# Patient Record
Sex: FEMALE | Race: WHITE | NOT HISPANIC OR LATINO | Employment: FULL TIME | URBAN - METROPOLITAN AREA
[De-identification: names, ages, dates, MRNs, and addresses within clinical notes are randomized per-mention and may not be internally consistent; named-entity substitution may affect disease eponyms.]

---

## 2017-01-24 RX ORDER — GABAPENTIN 300 MG/1
600 CAPSULE ORAL NIGHTLY
Qty: 180 CAPSULE | Refills: 3 | Status: SHIPPED | OUTPATIENT
Start: 2017-01-24 | End: 2018-02-01 | Stop reason: SDUPTHER

## 2017-01-24 NOTE — TELEPHONE ENCOUNTER
----- Message from Yanelis Diggs sent at 1/23/2017  3:06 PM CST -----  Request a new auth / gabapentin ... Insurance  requires 90 days   [facility]/pharmacy #[id number] - [place] - [address] [address]  [address] [place] [address]  Phone: [phone] Fax: [phone]

## 2017-08-25 DIAGNOSIS — Z12.11 COLON CANCER SCREENING: ICD-10-CM

## 2017-09-08 DIAGNOSIS — Z12.31 OTHER SCREENING MAMMOGRAM: ICD-10-CM

## 2017-10-11 ENCOUNTER — OFFICE VISIT (OUTPATIENT)
Dept: OTOLARYNGOLOGY | Facility: CLINIC | Age: 57
End: 2017-10-11
Payer: COMMERCIAL

## 2017-10-11 VITALS
WEIGHT: 107.56 LBS | HEIGHT: 63 IN | HEART RATE: 62 BPM | DIASTOLIC BLOOD PRESSURE: 77 MMHG | BODY MASS INDEX: 19.06 KG/M2 | SYSTOLIC BLOOD PRESSURE: 126 MMHG

## 2017-10-11 DIAGNOSIS — J06.9 VIRAL UPPER RESPIRATORY TRACT INFECTION: Primary | ICD-10-CM

## 2017-10-11 PROCEDURE — 96372 THER/PROPH/DIAG INJ SC/IM: CPT | Mod: S$GLB,,, | Performed by: NURSE PRACTITIONER

## 2017-10-11 PROCEDURE — 99203 OFFICE O/P NEW LOW 30 MIN: CPT | Mod: 25,S$GLB,, | Performed by: NURSE PRACTITIONER

## 2017-10-11 PROCEDURE — 99999 PR PBB SHADOW E&M-EST. PATIENT-LVL IV: CPT | Mod: PBBFAC,,, | Performed by: NURSE PRACTITIONER

## 2017-10-11 RX ORDER — TRIAMCINOLONE ACETONIDE 40 MG/ML
40 INJECTION, SUSPENSION INTRA-ARTICULAR; INTRAMUSCULAR ONCE
Status: COMPLETED | OUTPATIENT
Start: 2017-10-11 | End: 2017-10-11

## 2017-10-11 RX ADMIN — TRIAMCINOLONE ACETONIDE 40 MG: 40 INJECTION, SUSPENSION INTRA-ARTICULAR; INTRAMUSCULAR at 08:10

## 2017-10-11 NOTE — PATIENT INSTRUCTIONS
"When flying:  Take Sudafed 60 mg 1 hour prior to flying. Afrin nasal spray 30 minutes before take-off. Chew gum/pop ears gently when taking off and coming down. Drink water. Use "Airplanes" plugs designed for flying.     "

## 2017-10-11 NOTE — PROGRESS NOTES
Subjective:       Patient ID: Ashley Piper is a 57 y.o. female.    Chief Complaint: Nasal Congestion (x 2 days) and Cough    HPI   Patient is getting  tomorrow and flying to [place] for her honeymoon. She caught a cold virus 2 days ago: started off with sore scratchy throat, felt horrible yesterday, now with runny nose and cough. She states when she feels horrible, her ENT is [place], Dr. Ledbetter gives her a steroid shot. She is requesting a steroid shot as she cannot be sick tomorrow on her wedding day.     Review of Systems   Constitutional: Negative.  Negative for fever.   HENT: Positive for rhinorrhea and sore throat.    Eyes: Negative.    Respiratory: Positive for cough.    Cardiovascular: Negative.    Gastrointestinal: Negative.    Musculoskeletal: Negative.    Skin: Negative.    Neurological: Negative.    Hematological: Negative.    Psychiatric/Behavioral: Negative.        Objective:      Physical Exam   Constitutional: She is oriented to person, place, and time. Vital signs are normal. She appears well-developed and well-nourished. She is cooperative. She does not appear ill. No distress.   HENT:   Head: Normocephalic and atraumatic.   Right Ear: Hearing, tympanic membrane, external ear and ear canal normal. Tympanic membrane is not erythematous. No middle ear effusion.   Left Ear: Hearing, tympanic membrane, external ear and ear canal normal. Tympanic membrane is not erythematous.  No middle ear effusion.   Nose: Nose normal. No mucosal edema or rhinorrhea. Right sinus exhibits no maxillary sinus tenderness and no frontal sinus tenderness. Left sinus exhibits no maxillary sinus tenderness and no frontal sinus tenderness.   Mouth/Throat: Uvula is midline, oropharynx is clear and moist and mucous membranes are normal. Mucous membranes are not pale, not dry and not cyanotic. No oral lesions. No oropharyngeal exudate, posterior oropharyngeal edema or posterior oropharyngeal erythema.   Eyes:  Conjunctivae, EOM and lids are normal. Pupils are equal, round, and reactive to light. Right eye exhibits no discharge. Left eye exhibits no discharge. No scleral icterus.   Neck: Trachea normal and normal range of motion. Neck supple. No tracheal deviation present. No thyroid mass and no thyromegaly present.   Cardiovascular: Normal rate.    Pulmonary/Chest: Effort normal. No stridor. No respiratory distress. She has no wheezes.   Musculoskeletal: Normal range of motion.   Lymphadenopathy:        Head (right side): No submental, no submandibular, no tonsillar, no preauricular and no posterior auricular adenopathy present.        Head (left side): No submental, no submandibular, no tonsillar, no preauricular and no posterior auricular adenopathy present.     She has no cervical adenopathy.        Right cervical: No superficial cervical and no posterior cervical adenopathy present.       Left cervical: No superficial cervical and no posterior cervical adenopathy present.   Neurological: She is alert and oriented to person, place, and time. She has normal strength. Coordination and gait normal.   Skin: Skin is warm, dry and intact. No lesion and no rash noted. She is not diaphoretic. No cyanosis. No pallor.   Psychiatric: She has a normal mood and affect. Her speech is normal and behavior is normal. Judgment and thought content normal. Cognition and memory are normal.   Nursing note and vitals reviewed.      Assessment:       1. Viral upper respiratory tract infection        Plan:     Kenalog 40 mg IM X 1 in office today    Symptomatic care  Call or return if symptoms worsen/persist

## 2017-10-25 DIAGNOSIS — Z79.890 POSTMENOPAUSAL HRT (HORMONE REPLACEMENT THERAPY): ICD-10-CM

## 2017-10-25 RX ORDER — ESTRADIOL 2 MG/1
TABLET ORAL
Qty: 90 TABLET | Refills: 3 | Status: SHIPPED | OUTPATIENT
Start: 2017-10-25 | End: 2018-03-19 | Stop reason: SDUPTHER

## 2017-11-03 DIAGNOSIS — B00.9 HERPES: ICD-10-CM

## 2017-11-03 NOTE — TELEPHONE ENCOUNTER
----- Message from Xiomara Contreras sent at 11/3/2017 10:22 AM CDT -----  Contact: self  Patient is requesting a Rx refill for Acyclovir. Patient uses [facility] in [place]. Patient can be reached at [phone]. Patient is also requesting an annual appointment.

## 2017-11-06 ENCOUNTER — PATIENT MESSAGE (OUTPATIENT)
Dept: OBSTETRICS AND GYNECOLOGY | Facility: CLINIC | Age: 57
End: 2017-11-06

## 2017-11-06 DIAGNOSIS — B00.9 HERPES: ICD-10-CM

## 2017-11-06 RX ORDER — ACYCLOVIR 200 MG/1
400 CAPSULE ORAL 2 TIMES DAILY
Qty: 30 CAPSULE | Refills: 11 | Status: SHIPPED | OUTPATIENT
Start: 2017-11-06 | End: 2018-03-19 | Stop reason: SDUPTHER

## 2017-11-07 ENCOUNTER — PATIENT MESSAGE (OUTPATIENT)
Dept: FAMILY MEDICINE | Facility: CLINIC | Age: 57
End: 2017-11-07

## 2017-11-07 DIAGNOSIS — Z00.00 ROUTINE GENERAL MEDICAL EXAMINATION AT A HEALTH CARE FACILITY: Primary | ICD-10-CM

## 2017-11-07 RX ORDER — ACYCLOVIR 200 MG/1
400 CAPSULE ORAL 2 TIMES DAILY
Qty: 30 CAPSULE | Refills: 11 | Status: SHIPPED | OUTPATIENT
Start: 2017-11-07 | End: 2019-01-21 | Stop reason: SDUPTHER

## 2017-11-08 DIAGNOSIS — F43.9 SITUATIONAL STRESS: ICD-10-CM

## 2017-11-10 ENCOUNTER — TELEPHONE (OUTPATIENT)
Dept: OBSTETRICS AND GYNECOLOGY | Facility: CLINIC | Age: 57
End: 2017-11-10

## 2017-11-10 NOTE — TELEPHONE ENCOUNTER
----- Message from Cyndi Mcdonough sent at 11/10/2017 12:09 PM CST -----  _X  1st Request  _  2nd Request  _  3rd Request        Who: Shelly([facility])    Why: Requesting a call back in regards to an e-script for Lexapro 20 mg that was sent over. Please call respond or call to discuss.    What Number to Call Back:[phone]    When to Expect a call back: (Within 24 hours)    Please return the call at earliest convenience. Thanks!

## 2017-11-14 RX ORDER — ESCITALOPRAM OXALATE 20 MG/1
10 TABLET ORAL DAILY
Qty: 90 TABLET | Refills: 1 | Status: SHIPPED | OUTPATIENT
Start: 2017-11-14 | End: 2017-11-29 | Stop reason: SDUPTHER

## 2017-11-29 ENCOUNTER — OFFICE VISIT (OUTPATIENT)
Dept: OBSTETRICS AND GYNECOLOGY | Facility: CLINIC | Age: 57
End: 2017-11-29
Payer: COMMERCIAL

## 2017-11-29 VITALS
SYSTOLIC BLOOD PRESSURE: 130 MMHG | HEIGHT: 63 IN | WEIGHT: 106.69 LBS | DIASTOLIC BLOOD PRESSURE: 86 MMHG | BODY MASS INDEX: 18.9 KG/M2

## 2017-11-29 DIAGNOSIS — F43.9 SITUATIONAL STRESS: ICD-10-CM

## 2017-11-29 DIAGNOSIS — Z01.419 WOMEN'S ANNUAL ROUTINE GYNECOLOGICAL EXAMINATION: Primary | ICD-10-CM

## 2017-11-29 DIAGNOSIS — Z79.890 POSTMENOPAUSAL HRT (HORMONE REPLACEMENT THERAPY): ICD-10-CM

## 2017-11-29 PROCEDURE — 99999 PR PBB SHADOW E&M-EST. PATIENT-LVL IV: CPT | Mod: PBBFAC,,, | Performed by: NURSE PRACTITIONER

## 2017-11-29 PROCEDURE — 99396 PREV VISIT EST AGE 40-64: CPT | Mod: S$GLB,,, | Performed by: NURSE PRACTITIONER

## 2017-11-29 RX ORDER — ESCITALOPRAM OXALATE 20 MG/1
10 TABLET ORAL DAILY
Qty: 90 TABLET | Refills: 3 | Status: SHIPPED | OUTPATIENT
Start: 2017-11-29 | End: 2018-03-19

## 2017-11-29 RX ORDER — ONDANSETRON HYDROCHLORIDE 8 MG/1
8 TABLET, FILM COATED ORAL EVERY 6 HOURS PRN
Refills: 2 | COMMUNITY
Start: 2017-10-11 | End: 2019-05-02 | Stop reason: SDUPTHER

## 2017-11-29 RX ORDER — ALPRAZOLAM 0.5 MG/1
0.5 TABLET ORAL 3 TIMES DAILY PRN
Qty: 20 TABLET | Refills: 0 | Status: SHIPPED | OUTPATIENT
Start: 2017-11-29 | End: 2018-03-19 | Stop reason: SDUPTHER

## 2017-11-29 NOTE — PROGRESS NOTES
CC: Annual  HPI: Pt is a 57 y.o.  female who presents for routine annual exam. She is post-menopausal. She is on HRT-requesting refill of provera and estrace. She does not want STD screening. Pt is very stressed right now as her father is ill and not expected to live much longer. She is the power of  and main caregiver. Requesting a refill of xanax d/t stress as this was what helped her last year when dealing with her mother's death. Requesting refill of zovirax as well in case of an outbreak. MMG orders have been placed by her PCP-patient will call and make her own appointment. Recently had another granddaughter born. Pap is current. Taking lexapro 10 mg-splits 20 mg pill in half as that dose was too much for her.    Last pap-9/2016 WNL  Last mmg-none on record    ROS:  GENERAL: Feeling well overall. Denies fever or chills.   SKIN: Denies rash or lesions.   HEAD: Denies head injury or headache.   NODES: Denies enlarged lymph nodes.   CHEST: Denies chest pain or shortness of breath.   CARDIOVASCULAR: Denies palpitations or left sided chest pain.   ABDOMEN: No abdominal pain, constipation, diarrhea, nausea, vomiting or rectal bleeding.   URINARY: No dysuria, hematuria, or burning on urination.  REPRODUCTIVE: See HPI.   BREASTS: Denies pain, lumps, or nipple discharge.   HEMATOLOGIC: No easy bruisability or excessive bleeding.   MUSCULOSKELETAL: Denies joint pain or swelling.   NEUROLOGIC: Denies syncope or weakness.   PSYCHIATRIC: Denies depression, anxiety or mood swings.    PE:   APPEARANCE: Well nourished, well developed, White female in no acute distress.  NODES: no cervical, supraclavicular, or inguinal lymphadenopathy  BREASTS: Symmetrical, no skin changes or visible lesions. No palpable masses, nipple discharge or adenopathy bilaterally. + bilateral breast implants  ABDOMEN: Soft. No tenderness or masses. No distention. No hernias palpated. No CVA tenderness.  VULVA: No lesions. Normal external female  genitalia.  URETHRAL MEATUS: Normal size and location, no lesions, no prolapse.  URETHRA: No masses, tenderness, or prolapse.  VAGINA: Moist. No lesions or lacerations noted. No abnormal discharge present. No odor present.   CERVIX: No lesions or discharge. No cervical motion tenderness.   UTERUS: Normal size, regular shape, mobile, non-tender.  ADNEXA: No tenderness. No fullness or masses palpated in the adnexal regions.   ANUS PERINEUM: Normal.      Diagnosis:  1. Women's annual routine gynecological examination    2. Postmenopausal HRT (hormone replacement therapy)    3. Situational stress        Plan:     Orders Placed This Encounter    ALPRAZolam (XANAX) 0.5 MG tablet    escitalopram oxalate (LEXAPRO) 20 MG tablet     -pap current  -mmg order in and patient will make her own appt  -rx xanax  -refill of estrace, provera, and lexapro    Patient was counseled today on the new ACS guidelines for cervical cytology screening as well as the current recommendations for breast cancer screening. She was counseled to follow up with her PCP for other routine health maintenance. Counseling session lasted approximately 10 minutes, and all her questions were answered.    Follow-up with me in 1 year for routine exam; pap in 2 years.

## 2017-12-05 DIAGNOSIS — Z79.890 POSTMENOPAUSAL HRT (HORMONE REPLACEMENT THERAPY): ICD-10-CM

## 2017-12-06 RX ORDER — MEDROXYPROGESTERONE ACETATE 5 MG/1
5 TABLET ORAL DAILY
Qty: 90 TABLET | Refills: 4 | Status: SHIPPED | OUTPATIENT
Start: 2017-12-06 | End: 2018-03-19 | Stop reason: SDUPTHER

## 2018-01-24 ENCOUNTER — PATIENT MESSAGE (OUTPATIENT)
Dept: PAIN MEDICINE | Facility: CLINIC | Age: 58
End: 2018-01-24

## 2018-02-01 ENCOUNTER — OFFICE VISIT (OUTPATIENT)
Dept: PAIN MEDICINE | Facility: CLINIC | Age: 58
End: 2018-02-01
Payer: COMMERCIAL

## 2018-02-01 VITALS
WEIGHT: 107.81 LBS | TEMPERATURE: 98 F | DIASTOLIC BLOOD PRESSURE: 70 MMHG | BODY MASS INDEX: 19.1 KG/M2 | SYSTOLIC BLOOD PRESSURE: 110 MMHG | RESPIRATION RATE: 18 BRPM | HEART RATE: 72 BPM

## 2018-02-01 DIAGNOSIS — M50.30 DDD (DEGENERATIVE DISC DISEASE), CERVICAL: ICD-10-CM

## 2018-02-01 DIAGNOSIS — M47.892 OTHER OSTEOARTHRITIS OF SPINE, CERVICAL REGION: ICD-10-CM

## 2018-02-01 DIAGNOSIS — M54.12 CERVICAL RADICULOPATHY: Primary | ICD-10-CM

## 2018-02-01 PROCEDURE — 99213 OFFICE O/P EST LOW 20 MIN: CPT | Mod: SA,S$GLB,, | Performed by: PHYSICIAN ASSISTANT

## 2018-02-01 PROCEDURE — 99999 PR PBB SHADOW E&M-EST. PATIENT-LVL IV: CPT | Mod: PBBFAC,,, | Performed by: PHYSICIAN ASSISTANT

## 2018-02-01 PROCEDURE — 3008F BODY MASS INDEX DOCD: CPT | Mod: S$GLB,,, | Performed by: PHYSICIAN ASSISTANT

## 2018-02-01 RX ORDER — GABAPENTIN 300 MG/1
600 CAPSULE ORAL NIGHTLY
Qty: 180 CAPSULE | Refills: 3 | Status: SHIPPED | OUTPATIENT
Start: 2018-02-01 | End: 2019-01-29 | Stop reason: SDUPTHER

## 2018-02-06 NOTE — PROGRESS NOTES
This note was completed with dictation software and grammatical errors may exist.    CC:Neck pain    HPI: The patient is a 57-year-old woman with no major past medical history who presents in referral from the Dr. Kathy Melton for neck pain, right shoulder pain.  She returns in follow-up today with neck pain.  It has been almost 2 years since her last visit.  She reports left sided neck pain radiating to the trapezius muscle.  This is worse if she does not take her gabapentin.  She describes it as tight but not keeping her from doing anything.  She denies any radiation to her arm.  She denies weakness, numbness, bladder or bowel incontinence.    Pain intervention history:  She completed physical therapy at the [facility] with moderate relief.  She is status post C7-T1 cervical interlaminar epidural steroid injection on 9/21/15 with 15% relief.  She is status post C7-T1 cervical interlaminar epidural steroid injection on 10/26/15 with 75% relief.     ROS: She reports difficulty sleeping.  Balance of review of systems is negative.    Medical, surgical, family and social history reviewed elsewhere in record.    Medications/Allergies: See med card    Vitals:    02/01/18 1430   BP: 110/70   Pulse: 72   Resp: 18   Temp: 98.3 °F (36.8 °C)   TempSrc: Oral   Weight: 48.9 kg (107 lb 12.9 oz)   PainSc:   2   PainLoc: Neck         Physical exam:  Gen: A and O x3, pleasant, well-groomed  Skin: No rashes or obvious lesions  HEENT: PERRLA, no obvious deformities on ears or in canals.  CVS: Regular rate and rhythm, normal S1 and S2, no murmurs.  Resp: Clear to auscultation bilaterally, no wheezes or rales.  Abdomen: Soft, NT/ND, normal bowel sounds present.  Musculoskeletal: No antalgic gait.     Neuro:  Upper extremities: 5/5 strength bilaterally   Reflexes: Brachioradialis 2+, Bicep 2+, Tricep 2+.   Sensory: Intact and symmetrical to light touch and pinprick in C2-T1 dermatomes bilaterally.     Cervical  Spine:  Cervical spine: Range of motion is full with flexion and extension and lateral rotation with mild increased left neck pain during left lateral rotation.  Spurling's maneuver causes mild left neck pain to either side.  Myofascial exam: Mild tenderness to palpation across the left trapezius muscle.      Imagin/2/15 MRI C-spine  The cervical spinal cord is normal in signal and contour. No abnormal intrathecal enhancement.  C2/C3:No significant disc bulge, central canal or neural foraminal stenosis.  C3/C4: No significant disk bulge, central canal or neural foraminal stenosis.  C4/C5: Trace disk bulge with a pituitary hypertrophy facet joint arthropathy with out significant center canal stenosis with mild/moderate left and mild right neural foraminal stenosis.  C5/C6: Posterior disk osteophyte with uncovertebral joint or bridging facet arthropathy with mild center canal and moderate bilateral foraminal stenosis.  C6/C7: Posterior disk osteophyte with uncovertebral joint hypertrophy and facet joint arthropathy with mild/moderate central canal stenosis and moderate bilateral neural foraminal stenosis.   C7/T1: No significant disc bulge, central canal or neural foraminal stenosis.     Assessment:  The patient is a 57-year-old woman with no major past medical history who presents in referral from the Dr. Kathy Melton for neck pain, right shoulder pain.  1. Cervical radiculopathy     2. DDD (degenerative disc disease), cervical     3. Other osteoarthritis of spine, cervical region           Plan:  1.  I refilled the patient's gabapentin 600 mg nightly.  She reports adequate pain control with this.  2.  Follow-up in one year or sooner as needed.    Greater than 50% of this 15 minute visit was spent on counseling the patient.

## 2018-02-11 RX ORDER — GABAPENTIN 300 MG/1
600 CAPSULE ORAL NIGHTLY
Qty: 180 CAPSULE | Refills: 3 | Status: SHIPPED | OUTPATIENT
Start: 2018-02-11 | End: 2018-03-19 | Stop reason: SDUPTHER

## 2018-02-26 ENCOUNTER — TELEPHONE (OUTPATIENT)
Dept: GASTROENTEROLOGY | Facility: CLINIC | Age: 58
End: 2018-02-26

## 2018-02-26 DIAGNOSIS — Z12.11 COLON CANCER SCREENING: Primary | ICD-10-CM

## 2018-02-26 NOTE — TELEPHONE ENCOUNTER
----- Message from Mariano Kapoor MD sent at 2/26/2018 10:57 AM CST -----  Contact: Pt  done  ----- Message -----  From: Estee Cruz MA  Sent: 2/26/2018  10:38 AM  To: Mariano Kapoor MD     She stated you did a colonoscopy on her  last week and asked if you could do a colonoscopy on her.  She is calling for you to put an order in so she can schedule. She will call the endoscopy schedulers tomorrow to set up a date and time.  ----- Message -----  From: Veena Austin  Sent: 2/26/2018   9:52 AM  To: Antwon DIAZ Staff    Pt is calling to have an order placed in system for colonoscopy and can be reached at [phone].    Thank you

## 2018-02-27 DIAGNOSIS — Z12.11 SPECIAL SCREENING FOR MALIGNANT NEOPLASMS, COLON: Primary | ICD-10-CM

## 2018-02-27 RX ORDER — SODIUM, POTASSIUM,MAG SULFATES 17.5-3.13G
SOLUTION, RECONSTITUTED, ORAL ORAL
Qty: 1 BOTTLE | Refills: 0 | Status: SHIPPED | OUTPATIENT
Start: 2018-02-27 | End: 2019-01-21

## 2018-03-15 ENCOUNTER — LAB VISIT (OUTPATIENT)
Dept: LAB | Facility: HOSPITAL | Age: 58
End: 2018-03-15
Attending: FAMILY MEDICINE
Payer: COMMERCIAL

## 2018-03-15 DIAGNOSIS — Z00.00 ROUTINE GENERAL MEDICAL EXAMINATION AT A HEALTH CARE FACILITY: ICD-10-CM

## 2018-03-15 LAB
ALBUMIN SERPL BCP-MCNC: 3.8 G/DL
ALP SERPL-CCNC: 45 U/L
ALT SERPL W/O P-5'-P-CCNC: 17 U/L
ANION GAP SERPL CALC-SCNC: 8 MMOL/L
AST SERPL-CCNC: 19 U/L
BASOPHILS # BLD AUTO: 0.04 K/UL
BASOPHILS NFR BLD: 0.9 %
BILIRUB SERPL-MCNC: 0.4 MG/DL
BUN SERPL-MCNC: 18 MG/DL
CALCIUM SERPL-MCNC: 9.3 MG/DL
CHLORIDE SERPL-SCNC: 102 MMOL/L
CHOLEST SERPL-MCNC: 172 MG/DL
CHOLEST/HDLC SERPL: 2.4 {RATIO}
CO2 SERPL-SCNC: 28 MMOL/L
CREAT SERPL-MCNC: 0.7 MG/DL
DIFFERENTIAL METHOD: ABNORMAL
EOSINOPHIL # BLD AUTO: 0.2 K/UL
EOSINOPHIL NFR BLD: 4 %
ERYTHROCYTE [DISTWIDTH] IN BLOOD BY AUTOMATED COUNT: 12.4 %
EST. GFR  (AFRICAN AMERICAN): >60 ML/MIN/1.73 M^2
EST. GFR  (NON AFRICAN AMERICAN): >60 ML/MIN/1.73 M^2
GLUCOSE SERPL-MCNC: 86 MG/DL
HCT VFR BLD AUTO: 39.8 %
HDLC SERPL-MCNC: 72 MG/DL
HDLC SERPL: 41.9 %
HGB BLD-MCNC: 13.3 G/DL
IMM GRANULOCYTES # BLD AUTO: 0.01 K/UL
IMM GRANULOCYTES NFR BLD AUTO: 0.2 %
LDLC SERPL CALC-MCNC: 86.6 MG/DL
LYMPHOCYTES # BLD AUTO: 1.4 K/UL
LYMPHOCYTES NFR BLD: 31.9 %
MCH RBC QN AUTO: 31.7 PG
MCHC RBC AUTO-ENTMCNC: 33.4 G/DL
MCV RBC AUTO: 95 FL
MONOCYTES # BLD AUTO: 0.3 K/UL
MONOCYTES NFR BLD: 7.6 %
NEUTROPHILS # BLD AUTO: 2.5 K/UL
NEUTROPHILS NFR BLD: 55.4 %
NONHDLC SERPL-MCNC: 100 MG/DL
NRBC BLD-RTO: 0 /100 WBC
PLATELET # BLD AUTO: 216 K/UL
PMV BLD AUTO: 11.3 FL
POTASSIUM SERPL-SCNC: 4.2 MMOL/L
PROT SERPL-MCNC: 6.9 G/DL
RBC # BLD AUTO: 4.2 M/UL
SODIUM SERPL-SCNC: 138 MMOL/L
TRIGL SERPL-MCNC: 67 MG/DL
TSH SERPL DL<=0.005 MIU/L-ACNC: 1.12 UIU/ML
WBC # BLD AUTO: 4.48 K/UL

## 2018-03-15 PROCEDURE — 80053 COMPREHEN METABOLIC PANEL: CPT

## 2018-03-15 PROCEDURE — 36415 COLL VENOUS BLD VENIPUNCTURE: CPT | Mod: PO

## 2018-03-15 PROCEDURE — 85025 COMPLETE CBC W/AUTO DIFF WBC: CPT

## 2018-03-15 PROCEDURE — 84443 ASSAY THYROID STIM HORMONE: CPT

## 2018-03-15 PROCEDURE — 80061 LIPID PANEL: CPT

## 2018-03-19 ENCOUNTER — LAB VISIT (OUTPATIENT)
Dept: LAB | Facility: HOSPITAL | Age: 58
End: 2018-03-19
Attending: FAMILY MEDICINE
Payer: COMMERCIAL

## 2018-03-19 ENCOUNTER — OFFICE VISIT (OUTPATIENT)
Dept: FAMILY MEDICINE | Facility: CLINIC | Age: 58
End: 2018-03-19
Payer: COMMERCIAL

## 2018-03-19 VITALS
DIASTOLIC BLOOD PRESSURE: 68 MMHG | WEIGHT: 107.81 LBS | SYSTOLIC BLOOD PRESSURE: 117 MMHG | TEMPERATURE: 98 F | BODY MASS INDEX: 19.1 KG/M2 | HEIGHT: 63 IN | HEART RATE: 65 BPM

## 2018-03-19 DIAGNOSIS — M50.30 DDD (DEGENERATIVE DISC DISEASE), CERVICAL: ICD-10-CM

## 2018-03-19 DIAGNOSIS — F51.02 ADJUSTMENT INSOMNIA: ICD-10-CM

## 2018-03-19 DIAGNOSIS — F43.9 SITUATIONAL STRESS: ICD-10-CM

## 2018-03-19 DIAGNOSIS — Z11.59 NEED FOR HEPATITIS C SCREENING TEST: ICD-10-CM

## 2018-03-19 DIAGNOSIS — M85.872 OSTEOPENIA OF BOTH FEET: ICD-10-CM

## 2018-03-19 DIAGNOSIS — M85.871 OSTEOPENIA OF BOTH FEET: ICD-10-CM

## 2018-03-19 DIAGNOSIS — J30.1 CHRONIC SEASONAL ALLERGIC RHINITIS DUE TO POLLEN: ICD-10-CM

## 2018-03-19 DIAGNOSIS — Z79.890 POSTMENOPAUSAL HRT (HORMONE REPLACEMENT THERAPY): ICD-10-CM

## 2018-03-19 DIAGNOSIS — Z00.00 ROUTINE GENERAL MEDICAL EXAMINATION AT A HEALTH CARE FACILITY: Primary | ICD-10-CM

## 2018-03-19 DIAGNOSIS — M50.30 DEGENERATION OF CERVICAL INTERVERTEBRAL DISC: ICD-10-CM

## 2018-03-19 DIAGNOSIS — Z12.39 SCREENING FOR BREAST CANCER: ICD-10-CM

## 2018-03-19 DIAGNOSIS — Z78.0 MENOPAUSE: ICD-10-CM

## 2018-03-19 DIAGNOSIS — F41.9 ANXIETY: ICD-10-CM

## 2018-03-19 PROCEDURE — 99396 PREV VISIT EST AGE 40-64: CPT | Mod: S$GLB,,, | Performed by: FAMILY MEDICINE

## 2018-03-19 PROCEDURE — 99999 PR PBB SHADOW E&M-EST. PATIENT-LVL III: CPT | Mod: PBBFAC,,, | Performed by: FAMILY MEDICINE

## 2018-03-19 PROCEDURE — 36415 COLL VENOUS BLD VENIPUNCTURE: CPT | Mod: PO

## 2018-03-19 PROCEDURE — 86803 HEPATITIS C AB TEST: CPT

## 2018-03-19 RX ORDER — ESTRADIOL 2 MG/1
2 TABLET ORAL DAILY
Qty: 90 TABLET | Refills: 2 | Status: SHIPPED | OUTPATIENT
Start: 2018-03-19 | End: 2019-01-21 | Stop reason: SDUPTHER

## 2018-03-19 RX ORDER — ALPRAZOLAM 0.5 MG/1
0.5 TABLET ORAL 3 TIMES DAILY PRN
Qty: 20 TABLET | Refills: 0 | Status: SHIPPED | OUTPATIENT
Start: 2018-03-19 | End: 2019-05-02 | Stop reason: SDUPTHER

## 2018-03-19 RX ORDER — MEDROXYPROGESTERONE ACETATE 5 MG/1
5 TABLET ORAL DAILY
Qty: 90 TABLET | Refills: 2 | Status: SHIPPED | OUTPATIENT
Start: 2018-03-19 | End: 2019-01-21 | Stop reason: SDUPTHER

## 2018-03-19 RX ORDER — ESCITALOPRAM OXALATE 10 MG/1
10 TABLET ORAL DAILY
Qty: 90 TABLET | Refills: 2 | Status: SHIPPED | OUTPATIENT
Start: 2018-03-19 | End: 2018-12-18 | Stop reason: SDUPTHER

## 2018-03-19 NOTE — PROGRESS NOTES
Subjective:     Patient ID: Ashley Donnelly is a 57 y.o. female.    Chief Complaint: Annual Exam    HPI  Review of Systems   Constitutional: Negative for appetite change, fatigue and fever.   HENT: Negative for hearing loss and sore throat.    Eyes: Negative.    Respiratory: Negative for cough, chest tightness, shortness of breath and wheezing.    Cardiovascular: Negative for chest pain, palpitations and leg swelling.   Gastrointestinal: Negative for abdominal pain, blood in stool, constipation, diarrhea, nausea and vomiting.   Endocrine: Negative.    Genitourinary: Negative for difficulty urinating, dysuria, frequency, hematuria, menstrual problem, pelvic pain and urgency.   Musculoskeletal: Negative for back pain.   Skin: Negative for pallor and rash.   Allergic/Immunologic: Negative.    Neurological: Negative for dizziness, syncope, light-headedness and headaches.   Hematological: Negative for adenopathy.   Psychiatric/Behavioral: Negative.  Negative for dysphoric mood and sleep disturbance.       Objective:      Physical Exam   Constitutional: She is oriented to person, place, and time. She appears well-developed and well-nourished.   HENT:   Head: Normocephalic and atraumatic.   Right Ear: External ear normal.   Left Ear: External ear normal.   Nose: Nose normal.   Mouth/Throat: Oropharynx is clear and moist.   Eyes: Conjunctivae and EOM are normal. Pupils are equal, round, and reactive to light.   Neck: Normal range of motion. Neck supple. No JVD present. No thyromegaly present.   Cardiovascular: Normal rate, regular rhythm, normal heart sounds and intact distal pulses.    No murmur heard.  Pulmonary/Chest: Effort normal and breath sounds normal.   Abdominal: Soft. Bowel sounds are normal. She exhibits no mass. There is no tenderness.   Musculoskeletal: Normal range of motion. She exhibits no edema.   Lymphadenopathy:     She has no cervical adenopathy.   Neurological: She is alert and oriented to person, place,  and time. She has normal reflexes.   Skin: Skin is warm and dry.   Nevi of back   Psychiatric: She has a normal mood and affect. Her behavior is normal. Judgment and thought content normal.   Vitals reviewed.      Assessment:     Ashley was seen today for annual exam.    Diagnoses and all orders for this visit:    Routine general medical examination at a health care facility    Screening for breast cancer  -     Mammo Digital Screening Bilat with CAD; Future    Degeneration of cervical intervertebral disc    DDD (degenerative disc disease), cervical    Postmenopausal HRT (hormone replacement therapy)  -     estradiol (ESTRACE) 2 MG tablet; Take 1 tablet (2 mg total) by mouth once daily.  -     medroxyPROGESTERone (PROVERA) 5 MG tablet; Take 1 tablet (5 mg total) by mouth once daily.    Osteopenia of both feet    Chronic seasonal allergic rhinitis due to pollen    Menopause  -     DXA Bone Density Spine And Hip; Future    Need for hepatitis C screening test  -     Hepatitis C antibody; Future    Adjustment insomnia    Anxiety    Situational stress  -     ALPRAZolam (XANAX) 0.5 MG tablet; Take 1 tablet (0.5 mg total) by mouth 3 (three) times daily as needed for Insomnia or Anxiety.    Other orders  -     escitalopram oxalate (LEXAPRO) 10 MG tablet; Take 1 tablet (10 mg total) by mouth once daily.

## 2018-03-20 LAB — HCV AB SERPL QL IA: NEGATIVE

## 2018-04-03 ENCOUNTER — HOSPITAL ENCOUNTER (OUTPATIENT)
Dept: RADIOLOGY | Facility: HOSPITAL | Age: 58
Discharge: HOME | End: 2018-04-03
Attending: FAMILY MEDICINE
Payer: COMMERCIAL

## 2018-04-03 DIAGNOSIS — Z12.39 SCREENING FOR BREAST CANCER: ICD-10-CM

## 2018-04-03 PROCEDURE — 77063 BREAST TOMOSYNTHESIS BI: CPT | Mod: 26,,, | Performed by: RADIOLOGY

## 2018-04-03 PROCEDURE — 77067 SCR MAMMO BI INCL CAD: CPT | Mod: 26,,, | Performed by: RADIOLOGY

## 2018-04-03 PROCEDURE — 77067 SCR MAMMO BI INCL CAD: CPT | Mod: TC,PO

## 2018-04-16 ENCOUNTER — PATIENT MESSAGE (OUTPATIENT)
Dept: PAIN MEDICINE | Facility: CLINIC | Age: 58
End: 2018-04-16

## 2018-04-16 NOTE — LETTER
April 17, 2018    Ashley Donnelly  [address] [facility] [address]             [facility]  [address] [place] [address]  Phone: [phone]  Fax: [phone] To Whom It May Concern,    Ashley Donnelly is currently under our care for neck pain due to cervical degenerative disc disease and cervical spondylosis.  I recommend that she have a desk that allows her to stand as needed to help alleviate tension and pain in her neck.    Sincerely,      Armando Sharp PA-C

## 2018-04-17 NOTE — TELEPHONE ENCOUNTER
Please let the patient know I completed a letter.  I don't believe this can be sent through email as she requested.  Please ask if she wants it faxed or mailed.

## 2018-04-18 ENCOUNTER — TELEPHONE (OUTPATIENT)
Dept: PAIN MEDICINE | Facility: CLINIC | Age: 58
End: 2018-04-18

## 2018-04-26 ENCOUNTER — ANESTHESIA EVENT (OUTPATIENT)
Dept: ENDOSCOPY | Facility: HOSPITAL | Age: 58
End: 2018-04-26
Payer: COMMERCIAL

## 2018-04-26 ENCOUNTER — SURGERY (OUTPATIENT)
Age: 58
End: 2018-04-26

## 2018-04-26 ENCOUNTER — ANESTHESIA (OUTPATIENT)
Dept: ENDOSCOPY | Facility: HOSPITAL | Age: 58
End: 2018-04-26
Payer: COMMERCIAL

## 2018-04-26 ENCOUNTER — HOSPITAL ENCOUNTER (OUTPATIENT)
Facility: HOSPITAL | Age: 58
Discharge: HOME | End: 2018-04-26
Attending: INTERNAL MEDICINE | Admitting: INTERNAL MEDICINE
Payer: COMMERCIAL

## 2018-04-26 VITALS
BODY MASS INDEX: 18.96 KG/M2 | TEMPERATURE: 98 F | HEIGHT: 63 IN | OXYGEN SATURATION: 70 % | DIASTOLIC BLOOD PRESSURE: 65 MMHG | WEIGHT: 107 LBS | SYSTOLIC BLOOD PRESSURE: 134 MMHG | RESPIRATION RATE: 18 BRPM | HEART RATE: 72 BPM

## 2018-04-26 DIAGNOSIS — Z12.11 COLON CANCER SCREENING: Primary | ICD-10-CM

## 2018-04-26 PROCEDURE — D9220A PRA ANESTHESIA: Mod: CRNA,,, | Performed by: NURSE ANESTHETIST, CERTIFIED REGISTERED

## 2018-04-26 PROCEDURE — 63600175 PHARM REV CODE 636 W HCPCS: Performed by: NURSE ANESTHETIST, CERTIFIED REGISTERED

## 2018-04-26 PROCEDURE — 37000009 HC ANESTHESIA EA ADD 15 MINS: Performed by: INTERNAL MEDICINE

## 2018-04-26 PROCEDURE — D9220A PRA ANESTHESIA: Mod: ANES,,, | Performed by: ANESTHESIOLOGY

## 2018-04-26 PROCEDURE — 37000008 HC ANESTHESIA 1ST 15 MINUTES: Performed by: INTERNAL MEDICINE

## 2018-04-26 PROCEDURE — G0121 COLON CA SCRN NOT HI RSK IND: HCPCS | Mod: ,,, | Performed by: INTERNAL MEDICINE

## 2018-04-26 PROCEDURE — 25000003 PHARM REV CODE 250: Performed by: INTERNAL MEDICINE

## 2018-04-26 PROCEDURE — G0121 COLON CA SCRN NOT HI RSK IND: HCPCS | Performed by: INTERNAL MEDICINE

## 2018-04-26 RX ORDER — LIDOCAINE HCL/PF 100 MG/5ML
SYRINGE (ML) INTRAVENOUS
Status: DISCONTINUED | OUTPATIENT
Start: 2018-04-26 | End: 2018-04-26

## 2018-04-26 RX ORDER — SODIUM CHLORIDE 9 MG/ML
INJECTION, SOLUTION INTRAVENOUS CONTINUOUS
Status: DISCONTINUED | OUTPATIENT
Start: 2018-04-26 | End: 2018-04-26 | Stop reason: HOSPADM

## 2018-04-26 RX ORDER — PROPOFOL 10 MG/ML
VIAL (ML) INTRAVENOUS
Status: DISCONTINUED | OUTPATIENT
Start: 2018-04-26 | End: 2018-04-26

## 2018-04-26 RX ORDER — PROPOFOL 10 MG/ML
VIAL (ML) INTRAVENOUS CONTINUOUS PRN
Status: DISCONTINUED | OUTPATIENT
Start: 2018-04-26 | End: 2018-04-26

## 2018-04-26 RX ADMIN — LIDOCAINE HYDROCHLORIDE 75 MG: 20 INJECTION, SOLUTION INTRAVENOUS at 08:04

## 2018-04-26 RX ADMIN — PROPOFOL 40 MG: 10 INJECTION, EMULSION INTRAVENOUS at 08:04

## 2018-04-26 RX ADMIN — PROPOFOL 150 MCG/KG/MIN: 10 INJECTION, EMULSION INTRAVENOUS at 08:04

## 2018-04-26 RX ADMIN — SODIUM CHLORIDE: 9 INJECTION, SOLUTION INTRAVENOUS at 08:04

## 2018-04-26 RX ADMIN — PROPOFOL 80 MG: 10 INJECTION, EMULSION INTRAVENOUS at 08:04

## 2018-04-26 NOTE — H&P
Short Stay Endoscopy History and Physical    PCP - Kathy Melton, DO    Procedure - Colonoscopy  ASA - per anesthesia  Mallampati - per anesthesia  History of Anesthesia problems - no  Family history Anesthesia problems - no   Plan of anesthesia - General    HPI:  This is a 57 y.o. female here for evaluation of : colorectal cancer screening      ROS:  Constitutional: No fevers, chills, No weight loss  CV: No chest pain  Pulm: No cough, No shortness of breath  GI: see HPI  Derm: No rash    Medical History:  has a past medical history of Anxiety; Basal cell carcinoma (11/2014); Herpes simplex; History of abnormal cervical Pap smear; Hormone replacement therapy (HRT); PONV (postoperative nausea and vomiting); and Seasonal allergies.    Surgical History:  has a past surgical history that includes Neck mass excision (Right, 1998); Skin cancer excision (N/A, 11/2014); Breast surgery; and Skin cancer excision (2017).    Family History: family history includes Anemia in her father; Cancer in her maternal grandmother; Colon cancer in her maternal grandmother; Heart disease in her brother, father, and mother; Hyperlipidemia in her mother; Hypertension in her father and mother; Kidney disease in her mother; No Known Problems in her brother, brother, daughter, and sister.. Otherwise no colon cancer, inflammatory bowel disease, or GI malignancies.    Social History:  reports that she has never smoked. She has never used smokeless tobacco. She reports that she drinks alcohol. She reports that she uses drugs, including Benzodiazepines.    Review of patient's allergies indicates:   Allergen Reactions    Compazine [prochlorperazine edisylate] Other (See Comments)     Facial drooping       Medications:   Prescriptions Prior to Admission   Medication Sig Dispense Refill Last Dose    escitalopram oxalate (LEXAPRO) 10 MG tablet Take 1 tablet (10 mg total) by mouth once daily. 90 tablet 2 4/25/2018 at Unknown time    estradiol  (ESTRACE) 2 MG tablet Take 1 tablet (2 mg total) by mouth once daily. 90 tablet 2 4/25/2018 at Unknown time    gabapentin (NEURONTIN) 300 MG capsule Take 2 capsules (600 mg total) by mouth every evening. 180 capsule 3 4/25/2018 at Unknown time    medroxyPROGESTERone (PROVERA) 5 MG tablet Take 1 tablet (5 mg total) by mouth once daily. 90 tablet 2 4/25/2018 at Unknown time    acyclovir (ZOVIRAX) 200 MG capsule Take 2 capsules (400 mg total) by mouth 2 (two) times daily. 30 capsule 11 More than a month at Unknown time    ALPRAZolam (XANAX) 0.5 MG tablet Take 1 tablet (0.5 mg total) by mouth 3 (three) times daily as needed for Insomnia or Anxiety. 20 tablet 0 Unknown at Unknown time    biotin 5 mg Cap Take 1 capsule by mouth once daily.   Taking    fluticasone (FLONASE) 50 mcg/actuation nasal spray 2 sprays by Each Nare route once daily.  6 Taking    multivitamin capsule Take 1 capsule by mouth once daily.   4/24/2018    ondansetron (ZOFRAN) 8 MG tablet Take 8 mg by mouth every 6 (six) hours as needed.  2 Unknown at Unknown time    pimecrolimus (ELIDEL) 1 % cream Apply topically 2 (two) times daily as needed. 30 g 1 Taking    sodium,potassium,mag sulfates (SUPREP BOWEL PREP KIT) 17.5-3.13-1.6 gram SolR As directed.  Dispense 1 kit. 1 Bottle 0 Not Taking    VITAMIN B COMPLEX NO.12-NIACIN ORAL Take 1 tablet by mouth once daily.   4/24/2018         Physical Exam:    Vital Signs:   Vitals:    04/26/18 0812   BP: (!) 141/65   Pulse: 79   Resp: 18   Temp: 97.9 °F (36.6 °C)       General Appearance: Well appearing in no acute distress  Eyes:    No scleral icterus  ENT: Neck supple, Lips, mucosa, and tongue normal; teeth and gums normal  Lungs: CTA bilaterally  Heart:  S1, S2 normal, no murmurs heard  Abdomen: Soft, non tender, non distended with positive bowel sounds. No hepatosplenomegaly, ascites, or mass.  Extremities: 2+ pulses, no clubbing, cyanosis or edema  Skin: No rash      Labs:  Lab Results   Component  Value Date    WBC 4.48 03/15/2018    HGB 13.3 03/15/2018    HCT 39.8 03/15/2018     03/15/2018    CHOL 172 03/15/2018    TRIG 67 03/15/2018    HDL 72 03/15/2018    ALT 17 03/15/2018    AST 19 03/15/2018     03/15/2018    K 4.2 03/15/2018     03/15/2018    CREATININE 0.7 03/15/2018    BUN 18 03/15/2018    CO2 28 03/15/2018    TSH 1.122 03/15/2018    HGBA1C 5.1 09/20/2013       I have explained the risks and benefits of endoscopy procedures to the patient including but not limited to bleeding, perforation, infection, and death.    Gaston Cerda   Gastroenterology Fellow PGY VI  [phone]

## 2018-04-26 NOTE — ANESTHESIA POSTPROCEDURE EVALUATION
"Anesthesia Post Evaluation    Patient: Ashley Donnelly    Procedure(s) Performed: Procedure(s) (LRB):  COLONOSCOPY (N/A)    Final Anesthesia Type: general  Patient location during evaluation: GI PACU  Patient participation: Yes- Able to Participate  Level of consciousness: awake and alert  Post-procedure vital signs: reviewed and stable  Pain management: adequate  Airway patency: patent  PONV status at discharge: No PONV  Anesthetic complications: no      Cardiovascular status: blood pressure returned to baseline  Respiratory status: unassisted  Hydration status: euvolemic  Follow-up not needed.        Visit Vitals  BP (!) 111/58   Pulse 76   Temp 36.5 °C (97.7 °F) (Tympanic)   Resp 18   Ht 5' 3" (1.6 m)   Wt 48.5 kg (107 lb)   LMP 07/16/2015   SpO2 97%   Breastfeeding? No   BMI 18.95 kg/m²       Pain/Rojelio Score: Pain Assessment Performed: Yes (4/26/2018  9:07 AM)  Presence of Pain: denies (4/26/2018  9:07 AM)  Rojelio Score: 9 (4/26/2018  9:07 AM)      "

## 2018-04-26 NOTE — PROVATION PATIENT INSTRUCTIONS
Discharge Summary/Instructions after an Endoscopic Procedure  Patient Name: Ashley Donnelly  Patient MRN: [id number]  Patient YOB: 1960 Thursday, April 26, 2018  Mariano Kapoor MD  RESTRICTIONS:  During your procedure today, you received medications for sedation.  These   medications may affect your judgment, balance and coordination.  Therefore,   for 24 hours, you have the following restrictions:   - DO NOT drive a car, operate machinery, make legal/financial decisions,   sign important papers or drink alcohol.    ACTIVITY:  The following day: return to full activity including work, except no heavy   lifting, straining or running for 3 days if polyps were removed.  DIET:  Eat and drink normally unless instructed otherwise.     TREATMENT FOR COMMON SIDE EFFECTS:  - Mild abdominal pain, nausea, belching, bloating or excessive gas:  rest,   eat lightly and use a heating pad.  - Sore Throat: treat with throat lozenges and/or gargle with warm salt   water.  - Because air was used during the procedure, expelling large amounts of air   from your rectum or belching is normal.  - If a bowel prep was taken, you may not have a bowel movement for 1-3 days.    This is normal.  SYMPTOMS TO WATCH FOR AND REPORT TO YOUR PHYSICIAN:  1. Abdominal pain or bloating, other than gas cramps.  2. Chest pain.  3. Back pain.  4. Signs of infection such as: chills or fever occurring within 24 hours   after the procedure.  5. Rectal bleeding, which would show as bright red, maroon, or black stools.   (A tablespoon of blood from the rectum is not serious, especially if   hemorrhoids are present.)  6. Vomiting.  7. Weakness or dizziness.  GO DIRECTLY TO THE NEAREST EMERGENCY ROOM IF YOU HAVE ANY OF THE FOLLOWING:      Difficulty breathing              Chills and/or fever over 101 F   Persistent vomiting and/or vomiting blood   Severe abdominal pain   Severe chest pain   Black, tarry stools   Bleeding- more than one tablespoon   Any other  symptom or condition that you feel may need urgent attention  Your doctor recommends these additional instructions:  If any biopsies were taken, your doctors clinic will contact you in 1 to 2   weeks with any results.  - Patient has a contact number available for emergencies.  The signs and   symptoms of potential delayed complications were discussed with the   patient.  Return to normal activities tomorrow.  Written discharge   instructions were provided to the patient.   - Discharge patient to home (ambulatory).   - Resume previous diet.   - Continue present medications.   - Repeat colonoscopy in 10 years for screening purposes.   - Return to primary care physician as previously scheduled.  For questions, problems or results please call your physician - Mariano Kapoor MD at Work:  [phone].  [facility], EMERGENCY ROOM PHONE NUMBER: [phone]  IF A COMPLICATION OR EMERGENCY SITUATION ARISES AND YOU ARE UNABLE TO REACH   YOUR PHYSICIAN - GO DIRECTLY TO THE EMERGENCY ROOM.  Mariano Kapoor MD  4/26/2018 9:01:12 AM  This report has been verified and signed electronically.

## 2018-04-26 NOTE — TRANSFER OF CARE
"Anesthesia Transfer of Care Note    Patient: Ashley Donnelly    Procedure(s) Performed: Procedure(s) (LRB):  COLONOSCOPY (N/A)    Patient location: GI    Anesthesia Type: general    Transport from OR: Transported from OR on room air with adequate spontaneous ventilation    Post pain: adequate analgesia    Post assessment: no apparent anesthetic complications and tolerated procedure well    Post vital signs: stable    Level of consciousness: awake and alert    Nausea/Vomiting: no nausea/vomiting    Complications: none    Transfer of care protocol was followed      Last vitals:   Visit Vitals  BP (!) 111/58   Pulse 76   Temp 36.5 °C (97.7 °F) (Tympanic)   Resp 18   Ht 5' 3" (1.6 m)   Wt 48.5 kg (107 lb)   LMP 07/16/2015   SpO2 97%   Breastfeeding? No   BMI 18.95 kg/m²     "

## 2018-04-26 NOTE — ANESTHESIA PREPROCEDURE EVALUATION
04/26/2018  Ashley Donnelly is a 57 y.o., female.    Anesthesia Evaluation    I have reviewed the Patient Summary Reports.    I have reviewed the Nursing Notes.   I have reviewed the Medications.     Review of Systems  Anesthesia Hx:  Hx of Anesthetic complications PONV Personal Hx of Anesthesia complications, Post-Operative Nausea/Vomiting, in the past, but not with recent anesthetics / prophylaxis   Hematology/Oncology:  Hematology Normal   Oncology Normal     Cardiovascular:  Cardiovascular Normal     Pulmonary:  Pulmonary Normal    Renal/:  Renal/ Normal     Hepatic/GI:   Bowel Prep.    Musculoskeletal:   Arthritis     Neurological:  Neurology Normal    Endocrine:  Endocrine Normal    Dermatological:  Skin Normal    Psych:  Psychiatric Normal           Physical Exam  General:  Well nourished    Airway/Jaw/Neck:  Airway Findings: Mouth Opening: Normal Tongue: Normal  Mallampati: I  TM Distance: Normal, at least 6 cm  Jaw/Neck Findings:  Neck ROM: Extension Decreased, Mild  Neck Findings:     Eyes/Ears/Nose:  EYES/EARS/NOSE FINDINGS: Normal    Chest/Lungs:  Chest/Lungs Findings: Clear to auscultation, Normal Respiratory Rate     Heart/Vascular:  Heart Findings: Rate: Normal  Rhythm: Regular Rhythm  Sounds: Normal     Abdomen:  Abdomen Findings: Normal    Musculoskeletal:  Musculoskeletal Findings: Normal   Skin:  Skin Findings: Normal    Mental Status:  Mental Status Findings:  Cooperative, Alert and Oriented         Anesthesia Plan  Type of Anesthesia, risks & benefits discussed:  Anesthesia Type:  general  Patient's Preference:   Intra-op Monitoring Plan:   Intra-op Monitoring Plan Comments:   Post Op Pain Control Plan:   Post Op Pain Control Plan Comments:   Induction:   IV  Beta Blocker:         Informed Consent: Patient understands risks and agrees with Anesthesia plan.  Questions answered. Anesthesia  consent signed with patient.  ASA Score: 2     Day of Surgery Review of History & Physical: I have interviewed and examined the patient. I have reviewed the patient's H&P dated:            Ready For Surgery From Anesthesia Perspective.

## 2018-04-26 NOTE — DISCHARGE INSTRUCTIONS
Colonoscopy     A camera attached to a flexible tube with a viewing lens is used to take video pictures.     Colonoscopy is a test to view the inside of your lower digestive tract (colon and rectum). Sometimes it can show the last part of the small intestine (ileum). During the test, small pieces of tissue may be removed for testing. This is called a biopsy. Small growths, such as polyps, may also be removed.   Why is colonoscopy done?  The test is done to help look for colon cancer. And it can help find the source of abdominal pain, bleeding, and changes in bowel habits. It may be needed once a year, depending on factors such as your:  · Age  · Health history  · Family health history  · Symptoms  · Results from any prior colonoscopy  Risks and possible complications  These include:  · Bleeding               · A puncture or tear in the colon   · Risks of anesthesia  · A cancer lesion not being seen  Getting ready   To prepare for the test:  · Talk with your healthcare provider about the risks of the test (see below). Also ask your healthcare provider about alternatives to the test.  · Tell your healthcare provider about any medicines you take. Also tell him or her about any health conditions you may have.  · Make sure your rectum and colon are empty for the test. Follow the diet and bowel prep instructions exactly. If you dont, the test may need to be rescheduled.  · Plan for a friend or family member to drive you home after the test.     Colonoscopy provides an inside view of the entire colon.     You may discuss the results with your doctor right away or at a future visit.  During the test   The test is usually done in the hospital on an outpatient basis. This means you go home the same day. The procedure takes about 30 minutes. During that time:  · You are given relaxing (sedating) medicine through an IV line. You may be drowsy, or fully asleep.  · The healthcare provider will first give you a physical exam to  check for anal and rectal problems.  · Then the anus is lubricated and the scope inserted.  · If you are awake, you may have a feeling similar to needing to have a bowel movement. You may also feel pressure as air is pumped into the colon. Its OK to pass gas during the procedure.  · Biopsy, polyp removal, or other treatments may be done during the test.  After the test   You may have gas right after the test. It can help to try to pass it to help prevent later bloating. Your healthcare provider may discuss the results with you right away. Or you may need to schedule a follow-up visit to talk about the results. After the test, you can go back to your normal eating and other activities. You may be tired from the sedation and need to rest for a few hours.  Date Last Reviewed: 11/1/2016 © 2000-2017 [employer]. [address] [place] [address], [place] [address]. All rights reserved. This information is not intended as a substitute for professional medical care. Always follow your healthcare professional's instructions.

## 2018-05-03 ENCOUNTER — TELEPHONE (OUTPATIENT)
Dept: ENDOSCOPY | Facility: HOSPITAL | Age: 58
End: 2018-05-03

## 2018-12-18 RX ORDER — ESCITALOPRAM OXALATE 10 MG/1
10 TABLET ORAL DAILY
Qty: 90 TABLET | Refills: 1 | Status: SHIPPED | OUTPATIENT
Start: 2018-12-18 | End: 2019-02-06

## 2018-12-18 NOTE — TELEPHONE ENCOUNTER
LOV 03/19/18    [facility] message has been sent to the patient, to inform them that an appointment is due.

## 2019-01-21 ENCOUNTER — TELEPHONE (OUTPATIENT)
Dept: FAMILY MEDICINE | Facility: CLINIC | Age: 59
End: 2019-01-21

## 2019-01-21 ENCOUNTER — PATIENT MESSAGE (OUTPATIENT)
Dept: FAMILY MEDICINE | Facility: CLINIC | Age: 59
End: 2019-01-21

## 2019-01-21 DIAGNOSIS — Z79.890 POSTMENOPAUSAL HRT (HORMONE REPLACEMENT THERAPY): ICD-10-CM

## 2019-01-21 DIAGNOSIS — Z00.00 ROUTINE GENERAL MEDICAL EXAMINATION AT A HEALTH CARE FACILITY: Primary | ICD-10-CM

## 2019-01-21 DIAGNOSIS — B00.9 HERPES: ICD-10-CM

## 2019-01-21 RX ORDER — ESTRADIOL 2 MG/1
2 TABLET ORAL DAILY
Qty: 90 TABLET | Refills: 0 | Status: SHIPPED | OUTPATIENT
Start: 2019-01-21 | End: 2019-02-06 | Stop reason: SDUPTHER

## 2019-01-21 RX ORDER — MEDROXYPROGESTERONE ACETATE 5 MG/1
5 TABLET ORAL DAILY
Qty: 90 TABLET | Refills: 0 | Status: SHIPPED | OUTPATIENT
Start: 2019-01-21 | End: 2019-02-06 | Stop reason: SDUPTHER

## 2019-01-22 RX ORDER — ACYCLOVIR 200 MG/1
400 CAPSULE ORAL 2 TIMES DAILY
Qty: 30 CAPSULE | Refills: 0 | Status: SHIPPED | OUTPATIENT
Start: 2019-01-22 | End: 2019-02-06 | Stop reason: SDUPTHER

## 2019-01-29 ENCOUNTER — OFFICE VISIT (OUTPATIENT)
Dept: PAIN MEDICINE | Facility: CLINIC | Age: 59
End: 2019-01-29
Payer: COMMERCIAL

## 2019-01-29 VITALS
TEMPERATURE: 97 F | DIASTOLIC BLOOD PRESSURE: 60 MMHG | RESPIRATION RATE: 20 BRPM | SYSTOLIC BLOOD PRESSURE: 135 MMHG | HEART RATE: 72 BPM | BODY MASS INDEX: 19.53 KG/M2 | OXYGEN SATURATION: 100 % | WEIGHT: 110.25 LBS

## 2019-01-29 DIAGNOSIS — M54.12 CERVICAL RADICULOPATHY: Primary | ICD-10-CM

## 2019-01-29 DIAGNOSIS — M50.30 DDD (DEGENERATIVE DISC DISEASE), CERVICAL: ICD-10-CM

## 2019-01-29 PROCEDURE — 99213 PR OFFICE/OUTPT VISIT, EST, LEVL III, 20-29 MIN: ICD-10-PCS | Mod: S$GLB,,, | Performed by: PHYSICIAN ASSISTANT

## 2019-01-29 PROCEDURE — 99999 PR PBB SHADOW E&M-EST. PATIENT-LVL IV: ICD-10-PCS | Mod: PBBFAC,,, | Performed by: PHYSICIAN ASSISTANT

## 2019-01-29 PROCEDURE — 99213 OFFICE O/P EST LOW 20 MIN: CPT | Mod: S$GLB,,, | Performed by: PHYSICIAN ASSISTANT

## 2019-01-29 PROCEDURE — 3008F BODY MASS INDEX DOCD: CPT | Mod: CPTII,S$GLB,, | Performed by: PHYSICIAN ASSISTANT

## 2019-01-29 PROCEDURE — 3008F PR BODY MASS INDEX (BMI) DOCUMENTED: ICD-10-PCS | Mod: CPTII,S$GLB,, | Performed by: PHYSICIAN ASSISTANT

## 2019-01-29 PROCEDURE — 99999 PR PBB SHADOW E&M-EST. PATIENT-LVL IV: CPT | Mod: PBBFAC,,, | Performed by: PHYSICIAN ASSISTANT

## 2019-01-29 RX ORDER — GABAPENTIN 300 MG/1
600 CAPSULE ORAL NIGHTLY
Qty: 180 CAPSULE | Refills: 3 | Status: SHIPPED | OUTPATIENT
Start: 2019-01-29 | End: 2020-02-07

## 2019-01-29 RX ORDER — SODIUM CHLORIDE, SODIUM LACTATE, POTASSIUM CHLORIDE, CALCIUM CHLORIDE 600; 310; 30; 20 MG/100ML; MG/100ML; MG/100ML; MG/100ML
INJECTION, SOLUTION INTRAVENOUS CONTINUOUS
Status: CANCELLED | OUTPATIENT
Start: 2019-02-11

## 2019-01-29 NOTE — PROGRESS NOTES
This note was completed with dictation software and grammatical errors may exist.    CC:Neck pain    HPI: The patient is a 58-year-old woman with no major past medical history who presents in referral from the Dr. Kathy Melton for neck pain, right shoulder pain.  She returns in follow-up today with worsening neck pain. It has been almost year since her last visit.  She states that over the past few months her pain has gotten gradually worse.  This is located in the base of the neck radiating to the bilateral scapular regions and also in the left neck radiating to the left trapezius muscle.  The pain is worse with turning her head to the left and also with looking up and down.  She is having some difficulty sleeping at night because of the pain. She continues take gabapentin 600 mg nightly with relief.  She reports having pain in her left upper arm recently but this is not present today.  She denies weakness, numbness, bladder or bowel incontinence.    Pain intervention history:  She completed physical therapy at the [facility] with moderate relief.  She is status post C7-T1 cervical interlaminar epidural steroid injection on 9/21/15 with 15% relief.  She is status post C7-T1 cervical interlaminar epidural steroid injection on 10/26/15 with 75% relief.     ROS: She reports difficulty sleeping.  Balance of review of systems is negative.    Medical, surgical, family and social history reviewed elsewhere in record.    Medications/Allergies: See med card    Vitals:    01/29/19 0859   BP: 135/60   Pulse: 72   Resp: 20   Temp: 97.4 °F (36.3 °C)   TempSrc: Oral   SpO2: 100%   Weight: 50 kg (110 lb 3.7 oz)   PainSc:   4   PainLoc: Neck         Physical exam:  Gen: A and O x3, pleasant, well-groomed  Skin: No rashes or obvious lesions  HEENT: PERRLA, no obvious deformities on ears or in canals.  CVS: Regular rate and rhythm, normal S1 and S2, no murmurs.  Resp: Clear to auscultation bilaterally, no wheezes or  rales.  Abdomen: Soft, NT/ND, normal bowel sounds present.  Musculoskeletal: No antalgic gait.     Neuro:  Upper extremities: 5/5 strength bilaterally   Reflexes: Brachioradialis 2+, Bicep 2+, Tricep 2+.   Sensory: Intact and symmetrical to light touch and pinprick in C2-T1 dermatomes bilaterally.     Cervical Spine:  Cervical spine: Range of motion is full with flexion and extension and right lateral rotation with mild increased left neck and left trapezius muscle during flexion and extension.  Range of motion is mildly reduced with left lateral rotation with increased pain in the left neck and left trapezius muscle.  Spurling's maneuver causes mild left neck pain to either side.  Myofascial exam: Mild tenderness to palpation across the left trapezius muscle, bilateral scapular region.    Imagin/2/15 MRI C-spine  The cervical spinal cord is normal in signal and contour. No abnormal intrathecal enhancement.  C2/C3:No significant disc bulge, central canal or neural foraminal stenosis.  C3/C4: No significant disk bulge, central canal or neural foraminal stenosis.  C4/C5: Trace disk bulge with a pituitary hypertrophy facet joint arthropathy with out significant center canal stenosis with mild/moderate left and mild right neural foraminal stenosis.  C5/C6: Posterior disk osteophyte with uncovertebral joint or bridging facet arthropathy with mild center canal and moderate bilateral foraminal stenosis.  C6/C7: Posterior disk osteophyte with uncovertebral joint hypertrophy and facet joint arthropathy with mild/moderate central canal stenosis and moderate bilateral neural foraminal stenosis.   C7/T1: No significant disc bulge, central canal or neural foraminal stenosis.     Assessment:  The patient is a 58-year-old woman with no major past medical history who presents in referral from the Dr. Kathy Melton for neck pain, right shoulder pain.  1. Cervical radiculopathy     2. DDD (degenerative disc disease), cervical            Plan:  1.  Since the patient is having increasing pain I will set her up to repeat a cervical EVELIA.  She has done well with this in the past.  She will continue to take gabapentin 600 mg daily and exercise on her own.  If she does not have relief, we may consider diagnostic medial branch blocks.  2.  Follow-up in 4 weeks postprocedure sooner as needed.

## 2019-01-29 NOTE — H&P (VIEW-ONLY)
This note was completed with dictation software and grammatical errors may exist.    CC:Neck pain    HPI: The patient is a 58-year-old woman with no major past medical history who presents in referral from the Dr. Kathy Melton for neck pain, right shoulder pain.  She returns in follow-up today with worsening neck pain. It has been almost year since her last visit.  She states that over the past few months her pain has gotten gradually worse.  This is located in the base of the neck radiating to the bilateral scapular regions and also in the left neck radiating to the left trapezius muscle.  The pain is worse with turning her head to the left and also with looking up and down.  She is having some difficulty sleeping at night because of the pain. She continues take gabapentin 600 mg nightly with relief.  She reports having pain in her left upper arm recently but this is not present today.  She denies weakness, numbness, bladder or bowel incontinence.    Pain intervention history:  She completed physical therapy at the [facility] with moderate relief.  She is status post C7-T1 cervical interlaminar epidural steroid injection on 9/21/15 with 15% relief.  She is status post C7-T1 cervical interlaminar epidural steroid injection on 10/26/15 with 75% relief.     ROS: She reports difficulty sleeping.  Balance of review of systems is negative.    Medical, surgical, family and social history reviewed elsewhere in record.    Medications/Allergies: See med card    Vitals:    01/29/19 0859   BP: 135/60   Pulse: 72   Resp: 20   Temp: 97.4 °F (36.3 °C)   TempSrc: Oral   SpO2: 100%   Weight: 50 kg (110 lb 3.7 oz)   PainSc:   4   PainLoc: Neck         Physical exam:  Gen: A and O x3, pleasant, well-groomed  Skin: No rashes or obvious lesions  HEENT: PERRLA, no obvious deformities on ears or in canals.  CVS: Regular rate and rhythm, normal S1 and S2, no murmurs.  Resp: Clear to auscultation bilaterally, no wheezes or  rales.  Abdomen: Soft, NT/ND, normal bowel sounds present.  Musculoskeletal: No antalgic gait.     Neuro:  Upper extremities: 5/5 strength bilaterally   Reflexes: Brachioradialis 2+, Bicep 2+, Tricep 2+.   Sensory: Intact and symmetrical to light touch and pinprick in C2-T1 dermatomes bilaterally.     Cervical Spine:  Cervical spine: Range of motion is full with flexion and extension and right lateral rotation with mild increased left neck and left trapezius muscle during flexion and extension.  Range of motion is mildly reduced with left lateral rotation with increased pain in the left neck and left trapezius muscle.  Spurling's maneuver causes mild left neck pain to either side.  Myofascial exam: Mild tenderness to palpation across the left trapezius muscle, bilateral scapular region.    Imagin/2/15 MRI C-spine  The cervical spinal cord is normal in signal and contour. No abnormal intrathecal enhancement.  C2/C3:No significant disc bulge, central canal or neural foraminal stenosis.  C3/C4: No significant disk bulge, central canal or neural foraminal stenosis.  C4/C5: Trace disk bulge with a pituitary hypertrophy facet joint arthropathy with out significant center canal stenosis with mild/moderate left and mild right neural foraminal stenosis.  C5/C6: Posterior disk osteophyte with uncovertebral joint or bridging facet arthropathy with mild center canal and moderate bilateral foraminal stenosis.  C6/C7: Posterior disk osteophyte with uncovertebral joint hypertrophy and facet joint arthropathy with mild/moderate central canal stenosis and moderate bilateral neural foraminal stenosis.   C7/T1: No significant disc bulge, central canal or neural foraminal stenosis.     Assessment:  The patient is a 58-year-old woman with no major past medical history who presents in referral from the Dr. Kathy Melton for neck pain, right shoulder pain.  1. Cervical radiculopathy     2. DDD (degenerative disc disease), cervical            Plan:  1.  Since the patient is having increasing pain I will set her up to repeat a cervical EVELIA.  She has done well with this in the past.  She will continue to take gabapentin 600 mg daily and exercise on her own.  If she does not have relief, we may consider diagnostic medial branch blocks.  2.  Follow-up in 4 weeks postprocedure sooner as needed.

## 2019-02-05 ENCOUNTER — TELEPHONE (OUTPATIENT)
Dept: SURGERY | Facility: HOSPITAL | Age: 59
End: 2019-02-05

## 2019-02-05 NOTE — TELEPHONE ENCOUNTER
When doing pre op call, patient states that she experiences extreme nausea with any type of sedation/anesthesia. She asked that Dr. Morgan order something to be given to her during the procedure to help prevent this.  Thank you

## 2019-02-06 ENCOUNTER — OFFICE VISIT (OUTPATIENT)
Dept: OBSTETRICS AND GYNECOLOGY | Facility: CLINIC | Age: 59
End: 2019-02-06
Payer: COMMERCIAL

## 2019-02-06 ENCOUNTER — PATIENT MESSAGE (OUTPATIENT)
Dept: FAMILY MEDICINE | Facility: CLINIC | Age: 59
End: 2019-02-06

## 2019-02-06 VITALS
HEIGHT: 63 IN | BODY MASS INDEX: 19.64 KG/M2 | SYSTOLIC BLOOD PRESSURE: 114 MMHG | DIASTOLIC BLOOD PRESSURE: 72 MMHG | WEIGHT: 110.88 LBS

## 2019-02-06 DIAGNOSIS — Z79.890 POSTMENOPAUSAL HRT (HORMONE REPLACEMENT THERAPY): ICD-10-CM

## 2019-02-06 DIAGNOSIS — Z01.419 WOMEN'S ANNUAL ROUTINE GYNECOLOGICAL EXAMINATION: Primary | ICD-10-CM

## 2019-02-06 DIAGNOSIS — Z12.4 PAP SMEAR FOR CERVICAL CANCER SCREENING: ICD-10-CM

## 2019-02-06 DIAGNOSIS — B00.9 HERPES: ICD-10-CM

## 2019-02-06 DIAGNOSIS — Z12.31 VISIT FOR SCREENING MAMMOGRAM: ICD-10-CM

## 2019-02-06 PROCEDURE — 99396 PREV VISIT EST AGE 40-64: CPT | Mod: S$GLB,,, | Performed by: NURSE PRACTITIONER

## 2019-02-06 PROCEDURE — 87624 HPV HI-RISK TYP POOLED RSLT: CPT

## 2019-02-06 PROCEDURE — 88175 CYTOPATH C/V AUTO FLUID REDO: CPT

## 2019-02-06 PROCEDURE — 99396 PR PREVENTIVE VISIT,EST,40-64: ICD-10-PCS | Mod: S$GLB,,, | Performed by: NURSE PRACTITIONER

## 2019-02-06 PROCEDURE — 99999 PR PBB SHADOW E&M-EST. PATIENT-LVL III: ICD-10-PCS | Mod: PBBFAC,,, | Performed by: NURSE PRACTITIONER

## 2019-02-06 PROCEDURE — 99999 PR PBB SHADOW E&M-EST. PATIENT-LVL III: CPT | Mod: PBBFAC,,, | Performed by: NURSE PRACTITIONER

## 2019-02-06 RX ORDER — ESCITALOPRAM OXALATE 10 MG/1
5 TABLET ORAL DAILY
Qty: 45 TABLET | Refills: 1 | Status: SHIPPED | OUTPATIENT
Start: 2019-02-06 | End: 2019-05-02 | Stop reason: SDUPTHER

## 2019-02-06 RX ORDER — ESTRADIOL 2 MG/1
2 TABLET ORAL DAILY
Qty: 90 TABLET | Refills: 3 | Status: SHIPPED | OUTPATIENT
Start: 2019-02-06 | End: 2020-04-07

## 2019-02-06 RX ORDER — ACYCLOVIR 200 MG/1
400 CAPSULE ORAL 2 TIMES DAILY
Qty: 30 CAPSULE | Refills: 11 | Status: SHIPPED | OUTPATIENT
Start: 2019-02-06 | End: 2019-05-28 | Stop reason: SDUPTHER

## 2019-02-06 RX ORDER — MEDROXYPROGESTERONE ACETATE 5 MG/1
5 TABLET ORAL DAILY
Qty: 90 TABLET | Refills: 3 | Status: SHIPPED | OUTPATIENT
Start: 2019-02-06 | End: 2020-03-09

## 2019-02-06 NOTE — PROGRESS NOTES
CC: Annual  HPI: Pt is a 58 y.o.  female who presents for routine annual exam. She is . Requesting refills on her Lexapro, Zovirax, and HRT. Currently taking estrace and provera, doing well on it. Cut Lexapro down to 5 mg from 10 mg daily. Thinks the dose works best for her right now. Dad passed over the summer, she got remarried last year. Needs new recommendation for a PCP, her old one is moving the ED. Finally got a colonoscopy last year, WNL. Needs repeat in 10 years.     Last mmg 4/2018 WNL  Last pap in 2016 WNL    ROS:  GENERAL: Feeling well overall. Denies fever or chills.   SKIN: Denies rash or lesions.   HEAD: Denies head injury or headache.   NODES: Denies enlarged lymph nodes.   CHEST: Denies chest pain or shortness of breath.   CARDIOVASCULAR: Denies palpitations or left sided chest pain.   ABDOMEN: No abdominal pain, constipation, diarrhea, nausea, vomiting or rectal bleeding.   URINARY: No dysuria, hematuria, or burning on urination.  REPRODUCTIVE: See HPI.   BREASTS: Denies pain, lumps, or nipple discharge.   HEMATOLOGIC: No easy bruisability or excessive bleeding.   MUSCULOSKELETAL: Denies joint pain or swelling.   NEUROLOGIC: Denies syncope or weakness.   PSYCHIATRIC: Denies depression, anxiety or mood swings.    PE:   APPEARANCE: Well nourished, well developed, White female in no acute distress.  NODES: no cervical, supraclavicular, or inguinal lymphadenopathy  BREASTS: Symmetrical, no skin changes or visible lesions. No palpable masses, nipple discharge or adenopathy bilaterally.  ABDOMEN: Soft. No tenderness or masses. No distention. No hernias palpated. No CVA tenderness.  VULVA: No lesions. Normal external female genitalia.  URETHRAL MEATUS: Normal size and location, no lesions, no prolapse.  URETHRA: No masses, tenderness, or prolapse.  VAGINA: Moist. No lesions or lacerations noted. No abnormal discharge present. No odor present.   CERVIX: No lesions or discharge. No cervical motion  tenderness.   UTERUS: Normal size, regular shape, mobile, non-tender.  ADNEXA: No tenderness. No fullness or masses palpated in the adnexal regions.   ANUS PERINEUM: Normal.      Diagnosis:  1. Women's annual routine gynecological examination    2. Postmenopausal HRT (hormone replacement therapy)    3. Pap smear for cervical cancer screening    4. Herpes      Orders Placed This Encounter    HPV High Risk Genotypes, PCR    Liquid-based pap smear, screening    escitalopram oxalate (LEXAPRO) 10 MG tablet    medroxyPROGESTERone (PROVERA) 5 MG tablet    estradiol (ESTRACE) 2 MG tablet    acyclovir (ZOVIRAX) 200 MG capsule     Plan:   1. Pap updated  2. MMG current, due in April 2019. Order placed  3. Estrace, Provera, Lexapro, and Zovirax refilled  4. PCP rec given    Patient was counseled today on the new ACS guidelines for cervical cytology screening as well as the current recommendations for breast cancer screening. She was counseled to follow up with her PCP for other routine health maintenance. Counseling session lasted approximately 10 minutes, and all her questions were answered.    Follow-up with me in 1 year for routine exam; pap in 3 years.

## 2019-02-07 ENCOUNTER — TELEPHONE (OUTPATIENT)
Dept: FAMILY MEDICINE | Facility: CLINIC | Age: 59
End: 2019-02-07

## 2019-02-07 NOTE — TELEPHONE ENCOUNTER
----- Message from Kelley Hartmann sent at 2/7/2019  8:38 AM CST -----  Contact: self/[phone]  Type: Orders Request    What orders/ testing are being requested? Routine Labs     Is there a future appointment scheduled for the patient with PCP? Yes     When? 5/2/19    Would you prefer a response via [employer]? Yes     Comments: Please link lab orders to apt time. Please advise.        Thank You

## 2019-02-08 DIAGNOSIS — M50.30 DDD (DEGENERATIVE DISC DISEASE), CERVICAL: Primary | ICD-10-CM

## 2019-02-11 ENCOUNTER — HOSPITAL ENCOUNTER (OUTPATIENT)
Facility: HOSPITAL | Age: 59
Discharge: HOME | End: 2019-02-11
Attending: ANESTHESIOLOGY | Admitting: ANESTHESIOLOGY
Payer: COMMERCIAL

## 2019-02-11 ENCOUNTER — HOSPITAL ENCOUNTER (OUTPATIENT)
Dept: RADIOLOGY | Facility: HOSPITAL | Age: 59
Discharge: HOME | End: 2019-02-11
Attending: ANESTHESIOLOGY | Admitting: ANESTHESIOLOGY
Payer: COMMERCIAL

## 2019-02-11 VITALS
SYSTOLIC BLOOD PRESSURE: 124 MMHG | HEIGHT: 63 IN | HEART RATE: 76 BPM | DIASTOLIC BLOOD PRESSURE: 74 MMHG | RESPIRATION RATE: 16 BRPM | WEIGHT: 110 LBS | OXYGEN SATURATION: 100 % | TEMPERATURE: 97 F | BODY MASS INDEX: 19.49 KG/M2

## 2019-02-11 DIAGNOSIS — M50.30 DDD (DEGENERATIVE DISC DISEASE), CERVICAL: ICD-10-CM

## 2019-02-11 DIAGNOSIS — M54.12 CERVICAL RADICULOPATHY: Primary | ICD-10-CM

## 2019-02-11 LAB
HPV HR 12 DNA CVX QL NAA+PROBE: NEGATIVE
HPV16 AG SPEC QL: NEGATIVE
HPV18 DNA SPEC QL NAA+PROBE: NEGATIVE

## 2019-02-11 PROCEDURE — 99152 PR MOD CONSCIOUS SEDATION, SAME PHYS, 5+ YRS, FIRST 15 MIN: ICD-10-PCS | Mod: ,,, | Performed by: ANESTHESIOLOGY

## 2019-02-11 PROCEDURE — 25000003 PHARM REV CODE 250: Mod: PO | Performed by: ANESTHESIOLOGY

## 2019-02-11 PROCEDURE — 62321 PR INJ CERV/THORAC, W/GUIDANCE: ICD-10-PCS | Mod: ,,, | Performed by: ANESTHESIOLOGY

## 2019-02-11 PROCEDURE — 62321 NJX INTERLAMINAR CRV/THRC: CPT | Mod: ,,, | Performed by: ANESTHESIOLOGY

## 2019-02-11 PROCEDURE — 99152 MOD SED SAME PHYS/QHP 5/>YRS: CPT | Mod: ,,, | Performed by: ANESTHESIOLOGY

## 2019-02-11 PROCEDURE — 76000 FLUOROSCOPY <1 HR PHYS/QHP: CPT | Mod: TC,PO

## 2019-02-11 PROCEDURE — 63600175 PHARM REV CODE 636 W HCPCS: Mod: PO | Performed by: ANESTHESIOLOGY

## 2019-02-11 PROCEDURE — 25500020 PHARM REV CODE 255: Mod: PO | Performed by: ANESTHESIOLOGY

## 2019-02-11 PROCEDURE — A4216 STERILE WATER/SALINE, 10 ML: HCPCS | Mod: PO | Performed by: ANESTHESIOLOGY

## 2019-02-11 PROCEDURE — 62321 NJX INTERLAMINAR CRV/THRC: CPT | Mod: PO | Performed by: ANESTHESIOLOGY

## 2019-02-11 RX ORDER — LIDOCAINE HYDROCHLORIDE 10 MG/ML
1 INJECTION, SOLUTION EPIDURAL; INFILTRATION; INTRACAUDAL; PERINEURAL ONCE
Status: COMPLETED | OUTPATIENT
Start: 2019-02-11 | End: 2019-02-11

## 2019-02-11 RX ORDER — SODIUM CHLORIDE 9 MG/ML
INJECTION, SOLUTION INTRAMUSCULAR; INTRAVENOUS; SUBCUTANEOUS
Status: DISCONTINUED | OUTPATIENT
Start: 2019-02-11 | End: 2019-02-11 | Stop reason: HOSPADM

## 2019-02-11 RX ORDER — METHYLPREDNISOLONE ACETATE 80 MG/ML
INJECTION, SUSPENSION INTRA-ARTICULAR; INTRALESIONAL; INTRAMUSCULAR; SOFT TISSUE
Status: DISCONTINUED | OUTPATIENT
Start: 2019-02-11 | End: 2019-02-11 | Stop reason: HOSPADM

## 2019-02-11 RX ORDER — LIDOCAINE HYDROCHLORIDE 10 MG/ML
INJECTION, SOLUTION EPIDURAL; INFILTRATION; INTRACAUDAL; PERINEURAL
Status: DISCONTINUED | OUTPATIENT
Start: 2019-02-11 | End: 2019-02-11 | Stop reason: HOSPADM

## 2019-02-11 RX ORDER — ONDANSETRON 2 MG/ML
INJECTION INTRAMUSCULAR; INTRAVENOUS
Status: DISCONTINUED | OUTPATIENT
Start: 2019-02-11 | End: 2019-02-11 | Stop reason: HOSPADM

## 2019-02-11 RX ORDER — SODIUM CHLORIDE, SODIUM LACTATE, POTASSIUM CHLORIDE, CALCIUM CHLORIDE 600; 310; 30; 20 MG/100ML; MG/100ML; MG/100ML; MG/100ML
INJECTION, SOLUTION INTRAVENOUS CONTINUOUS
Status: DISCONTINUED | OUTPATIENT
Start: 2019-02-11 | End: 2019-02-11 | Stop reason: HOSPADM

## 2019-02-11 RX ORDER — MIDAZOLAM HYDROCHLORIDE 2 MG/2ML
INJECTION, SOLUTION INTRAMUSCULAR; INTRAVENOUS
Status: DISCONTINUED | OUTPATIENT
Start: 2019-02-11 | End: 2019-02-11 | Stop reason: HOSPADM

## 2019-02-11 RX ADMIN — SODIUM CHLORIDE, SODIUM LACTATE, POTASSIUM CHLORIDE, AND CALCIUM CHLORIDE: .6; .31; .03; .02 INJECTION, SOLUTION INTRAVENOUS at 11:02

## 2019-02-11 RX ADMIN — LIDOCAINE HYDROCHLORIDE 1 MG: 10 INJECTION, SOLUTION EPIDURAL; INFILTRATION; INTRACAUDAL; PERINEURAL at 11:02

## 2019-02-11 NOTE — DISCHARGE INSTRUCTIONS
Home care instructions  Apply ice pack to the injection site for 20 minutes periods for the first 24 hrs for soreness/discomfort at injection site DO NOT USE HEAT FOR 24 HOURS  Keep site clean and dry for 24 hours, remove bandaid when desired  Do not drive until tomorrow  Avoid strenuous activities for 2 days  Make take 2 weeks to feel the full effects   Resume home medication as prescribed today  Resume Aspirin, Plavix, or Coumadin the day after the procedure unless otherwise instructed.    SEE IMMEDIATE MEDICAL HELP FOR:  Severe increase in your usual pain or appearance of new pain  Prolonged or increasing weakness or numbness in the legs or arms  Drainage, redness, active bleeding, or increased swelling at the injection site  Temperature over 100.0 degrees F.  Headache that increases when your head is upright and decreases when you lie flat    CALL 911 OR GO DIRECTLY TO EMERGENCY DEPARTMENT FOR:  Shortness of breath, chest pain, or problems breathing      Recovery After Procedural Sedation (Adult)  You have been given medicine by vein to make you sleep during your surgery. This may have included both a pain medicine and sleeping medicine. Most of the effects have worn off. But you may still have some drowsiness for the next 6 to 8 hours.  Home care  Follow these guidelines when you get home:  · For the next 8 hours, you should be watched by a responsible adult. This person should make sure your condition is not getting worse.  · Don't drink any alcohol for the next 24 hours.  · Don't drive, operate dangerous machinery, or make important business or personal decisions during the next 24 hours.  Note: Your healthcare provider may tell you not to take any medicine by mouth for pain or sleep in the next 4 hours. These medicines may react with the medicines you were given in the hospital. This could cause a much stronger response than usual.  Follow-up care  Follow up with your healthcare provider if you are not alert  and back to your usual level of activity within 12 hours.  When to seek medical advice  Call your healthcare provider right away if any of these occur:  · Drowsiness gets worse  · Weakness or dizziness gets worse  · Repeated vomiting  · You can't be awakened   Date Last Reviewed: 10/18/2016  © 4005-8531 [employer]. [address] [place] [address], [place] [address]. All rights reserved. This information is not intended as a substitute for professional medical care. Always follow your healthcare professional's instructions.

## 2019-02-11 NOTE — OP NOTE
PROCEDURE DATE: 2/11/2019    Procedure: C7-T1 cervical interlaminar epidural steroid injection under utilizing fluoroscopy.    Diagnosis: Cervical Radiculopathy    POSTOP DIAGNOSIS: SAME    Physician: Liam Morgan MD    Medications injected:  Methylprednisone 80mg followed by a slow injection of 4 mL sterile, preservative-free normal saline.    Local anesthetic used: Lidocaine 1%, 4 ml.    Sedation Medications: 4mg versed    Complications:  none    Estimated blood loss: none    Technique:  A time-out was taken to identify patient and procedure prior to starting the procedure.  With the patient laying in a prone position with the neck in a mid-flexed forward position, the area was prepped and draped in the usual sterile fashion using ChloraPrep and a fenestrated drape.  The area was determined under AP fluoroscopic guidance.  Local anesthetic was given using a 25-gauge 1.5 inch needle by raising a wheal and then infiltrating ventrally.  A 3.5 inch 20-gauge Touhy needle was introduced under fluoroscopic guidance to meet the lamina of C7.  The needle was then hinged under the lamina then advanced using loss of resistance technique.  Once the tip of the needle was in the desired position, the contrast dye Omnipaque was injected to determine placement and no uptake.  The steroid was then injected slowly followed by a slow injection of 4 mL of the sterile preservative-free normal saline.  The patient tolerated the procedure well.    The patient was monitored after the procedure and was given post-procedure and discharge instructions to follow at home. The patient was discharged in a stable condition.

## 2019-02-11 NOTE — DISCHARGE SUMMARY
[facility]  Discharge Note  Short Stay    Admit Date: 2/11/2019    Discharge Date: 2/11/2019    Attending Physician: Liam Morgan MD     Discharge Provider: Liam Morgan    Diagnoses:  Active Hospital Problems    Diagnosis  POA    *Cervical radiculopathy [M54.12]  Yes      Resolved Hospital Problems   No resolved problems to display.       Discharged Condition: good    Final Diagnoses: Cervical radiculopathy [M54.12]    Disposition: Home or Self Care    Hospital Course: no complications, uneventful    Outcome of Hospitalization, Treatment, Procedure, or Surgery:  Patient was admitted for outpatient procedure. The patient underwent procedure without complications and are discharged home    Follow up/Patient Instructions:  Follow up as scheduled in Pain Management clinic in 3-4 weeks/Patient has received instructions and follow up date and time    Medications:  Continue previous medications    Discharge Procedure Orders   Call MD for:  temperature >100.4     Call MD for:  severe uncontrolled pain     Call MD for:  redness, tenderness, or signs of infection (pain, swelling, redness, odor or green/yellow discharge around incision site)     Call MD for:  severe persistent headache     No dressing needed         Discharge Procedure Orders (must include Diet, Follow-up, Activity):   Discharge Procedure Orders (must include Diet, Follow-up, Activity)   Call MD for:  temperature >100.4     Call MD for:  severe uncontrolled pain     Call MD for:  redness, tenderness, or signs of infection (pain, swelling, redness, odor or green/yellow discharge around incision site)     Call MD for:  severe persistent headache     No dressing needed

## 2019-02-11 NOTE — PLAN OF CARE
VSS. Questions answered to patient satisfaction. Site kahlil needed. H&P update needed. Patient denies recent illness. Patient ready for procedure.

## 2019-02-11 NOTE — PLAN OF CARE
Vss, melia po fluids, denies pain, ambulates easily. IV removed, catheter intact. Discharge instructions provided and states understanding. States ready to go home.  Discharged from facility with family.

## 2019-02-12 DIAGNOSIS — F43.9 SITUATIONAL STRESS: ICD-10-CM

## 2019-02-12 RX ORDER — ESCITALOPRAM OXALATE 20 MG/1
TABLET ORAL
Qty: 90 TABLET | Refills: 1 | Status: SHIPPED | OUTPATIENT
Start: 2019-02-12 | End: 2019-05-02 | Stop reason: DRUGHIGH

## 2019-03-12 ENCOUNTER — OFFICE VISIT (OUTPATIENT)
Dept: PAIN MEDICINE | Facility: CLINIC | Age: 59
End: 2019-03-12
Payer: COMMERCIAL

## 2019-03-12 VITALS
SYSTOLIC BLOOD PRESSURE: 111 MMHG | HEART RATE: 83 BPM | DIASTOLIC BLOOD PRESSURE: 52 MMHG | RESPIRATION RATE: 18 BRPM | OXYGEN SATURATION: 100 % | WEIGHT: 110.81 LBS | TEMPERATURE: 97 F | BODY MASS INDEX: 19.62 KG/M2

## 2019-03-12 DIAGNOSIS — M54.12 CERVICAL RADICULOPATHY: Primary | ICD-10-CM

## 2019-03-12 DIAGNOSIS — M50.30 DDD (DEGENERATIVE DISC DISEASE), CERVICAL: ICD-10-CM

## 2019-03-12 PROCEDURE — 3008F BODY MASS INDEX DOCD: CPT | Mod: CPTII,S$GLB,, | Performed by: PHYSICIAN ASSISTANT

## 2019-03-12 PROCEDURE — 99213 PR OFFICE/OUTPT VISIT, EST, LEVL III, 20-29 MIN: ICD-10-PCS | Mod: S$GLB,,, | Performed by: PHYSICIAN ASSISTANT

## 2019-03-12 PROCEDURE — 3008F PR BODY MASS INDEX (BMI) DOCUMENTED: ICD-10-PCS | Mod: CPTII,S$GLB,, | Performed by: PHYSICIAN ASSISTANT

## 2019-03-12 PROCEDURE — 99213 OFFICE O/P EST LOW 20 MIN: CPT | Mod: S$GLB,,, | Performed by: PHYSICIAN ASSISTANT

## 2019-03-12 PROCEDURE — 99999 PR PBB SHADOW E&M-EST. PATIENT-LVL IV: CPT | Mod: PBBFAC,,, | Performed by: PHYSICIAN ASSISTANT

## 2019-03-12 PROCEDURE — 99999 PR PBB SHADOW E&M-EST. PATIENT-LVL IV: ICD-10-PCS | Mod: PBBFAC,,, | Performed by: PHYSICIAN ASSISTANT

## 2019-03-12 NOTE — PROGRESS NOTES
This note was completed with dictation software and grammatical errors may exist.    CC:Neck pain    HPI: The patient is a 58-year-old woman with no major past medical history who presents in referral from the Dr. Kathy Melton for neck pain, right shoulder pain.  She is status post C7-T1 cervical interlaminar epidural steroid injection on 02/11/2019 with over 90% relief.  She has some remaining discomfort on the left side of her neck and between her shoulder blades but she states this is not necessarily pain. She reports recently doing some housework and did not have any pain afterwards and is very pleased with this.  She continues to take gabapentin 600 mg nightly with relief.  She denies any current radicular pain.  She denies weakness, numbness, bladder or bowel incontinence.    Pain intervention history:  She completed physical therapy at the [facility] [place] with moderate relief.  She is status post C7-T1 cervical interlaminar epidural steroid injection on 9/21/15 with 15% relief.  She is status post C7-T1 cervical interlaminar epidural steroid injection on 10/26/15 with 75% relief.   She is status post C7-T1 cervical interlaminar epidural steroid injection on 02/11/2019 with over 90% relief.    ROS: She reports difficulty sleeping.  Balance of review of systems is negative.    Medical, surgical, family and social history reviewed elsewhere in record.    Medications/Allergies: See med card    Vitals:    03/12/19 0800   BP: (!) 111/52   Pulse: 83   Resp: 18   Temp: 97.1 °F (36.2 °C)   TempSrc: Oral   SpO2: 100%   Weight: 50.3 kg (110 lb 12.5 oz)   PainSc: 0-No pain         Physical exam:  Gen: A and O x3, pleasant, well-groomed  Skin: No rashes or obvious lesions  HEENT: PERRLA, no obvious deformities on ears or in canals.  CVS: Regular rate and rhythm, normal S1 and S2, no murmurs.  Resp: Clear to auscultation bilaterally, no wheezes or rales.  Abdomen: Soft, NT/ND, normal bowel sounds  present.  Musculoskeletal: No antalgic gait.      Neuro:  Upper extremities: 5/5 strength bilaterally   Reflexes: Brachioradialis 2+, Bicep 2+, Tricep 2+  Sensory: Intact and symmetrical to light touch and pinprick in C2-T1 dermatomes bilaterally    Cervical Spine:  Cervical spine: Range of motion is full with flexion and extension and lateral rotation with mild pulling sensation in her left neck during right lateral rotation.  Spurling's maneuver causes mild left neck pain to either side.  Myofascial exam:  No tenderness to palpation to the cervical paraspinous muscles.    Imagin/2/15 MRI C-spine  The cervical spinal cord is normal in signal and contour. No abnormal intrathecal enhancement.  C2/C3:No significant disc bulge, central canal or neural foraminal stenosis.  C3/C4: No significant disk bulge, central canal or neural foraminal stenosis.  C4/C5: Trace disk bulge with a pituitary hypertrophy facet joint arthropathy with out significant center canal stenosis with mild/moderate left and mild right neural foraminal stenosis.  C5/C6: Posterior disk osteophyte with uncovertebral joint or bridging facet arthropathy with mild center canal and moderate bilateral foraminal stenosis.  C6/C7: Posterior disk osteophyte with uncovertebral joint hypertrophy and facet joint arthropathy with mild/moderate central canal stenosis and moderate bilateral neural foraminal stenosis.   C7/T1: No significant disc bulge, central canal or neural foraminal stenosis.     Assessment:  The patient is a 58-year-old woman with no major past medical history who presents in referral from the Dr. Kathy Melton for neck pain, right shoulder pain.  1. Cervical radiculopathy     2. DDD (degenerative disc disease), cervical           Plan:  1.  The patient had almost complete relief following the cervical EVELIA.  This can be repeated in the future if necessary.  2.  I encouraged her to continue to take gabapentin 600 mg daily.  She will continue  exercise.  3.  Follow-up in 1 year or sooner as needed.

## 2019-03-20 ENCOUNTER — OFFICE VISIT (OUTPATIENT)
Dept: OPHTHALMOLOGY | Facility: CLINIC | Age: 59
End: 2019-03-20
Payer: COMMERCIAL

## 2019-03-20 DIAGNOSIS — B30.9 ACUTE VIRAL CONJUNCTIVITIS OF BOTH EYES: Primary | ICD-10-CM

## 2019-03-20 PROCEDURE — 99999 PR PBB SHADOW E&M-EST. PATIENT-LVL III: ICD-10-PCS | Mod: PBBFAC,,, | Performed by: OPHTHALMOLOGY

## 2019-03-20 PROCEDURE — 92002 INTRM OPH EXAM NEW PATIENT: CPT | Mod: S$GLB,,, | Performed by: OPHTHALMOLOGY

## 2019-03-20 PROCEDURE — 92002 PR EYE EXAM, NEW PATIENT,INTERMED: ICD-10-PCS | Mod: S$GLB,,, | Performed by: OPHTHALMOLOGY

## 2019-03-20 PROCEDURE — 99999 PR PBB SHADOW E&M-EST. PATIENT-LVL III: CPT | Mod: PBBFAC,,, | Performed by: OPHTHALMOLOGY

## 2019-03-20 NOTE — LETTER
March 20, 2019      Ofe Espinoza MD  [address]  [facility] [address]           [facility]  [address] [facility] [address] [place] [address]  Phone: [phone]  Fax: [phone]          Patient: Ashley Donnelly   MR Number: [id number]   YOB: 1960   Date of Visit: 3/20/2019       Dear Dr. Ofe Espinoza:    Thank you for referring Ashley Donnelly to me for evaluation. Attached you will find relevant portions of my assessment and plan of care.    If you have questions, please do not hesitate to call me. I look forward to following Ashley Donnelly along with you.    Sincerely,    Faheem Kemp Jr., MD    Enclosure  CC:  No Recipients    If you would like to receive this communication electronically, please contact [email or web address] or [phone] to request more information on [employer] access.    For providers and/or their staff who would like to refer a patient to [facility], please contact us through our one-stop-shop provider referral line, [facility], at [phone].    If you feel you have received this communication in error or would no longer like to receive these types of communications, please e-mail [email or web address]

## 2019-03-20 NOTE — PROGRESS NOTES
HPI     Eye Problem      Additional comments: Itchy, red and discharge OU              Comments     DLE: x 5 yrs    Pt states x 1 week ago started getting some redness and itching OS > OD.   Thought is was allergies. Started useing Sulfacetamide on Monday 1-2 drops   every 3 hours until this am. Eyes still red, itchy and now has discharge.             Last edited by Cheryle Quintana on 3/20/2019  2:22 PM. (History)        ROS     Positive for: Eyes    Last edited by Faheem Kemp Jr., MD on 3/20/2019  2:58 PM. (History)        Assessment /Plan     For exam results, see Encounter Report.    Acute viral conjunctivitis of both eyes    -counseled patient that viral conjunctivitis is a self-limited condition that typically gets worse for the first 4 to 7 days after onset and may not resolve for 2 to 3 weeks  -recommended frequent handwashing due to high risk of being contagious  -preservative free ATs and/or tear ointment 4 to 8 x daily for at least 3 weeks  -Advise single-use vials to limit up tip contamination  -Cool compresses several times daily  -Alaway (anti-histamine drops) BID ; D/C Sulfacetamide  -f/u 2 weeks for check up

## 2019-04-03 ENCOUNTER — OFFICE VISIT (OUTPATIENT)
Dept: URGENT CARE | Facility: CLINIC | Age: 59
End: 2019-04-03
Payer: COMMERCIAL

## 2019-04-03 VITALS
HEART RATE: 80 BPM | SYSTOLIC BLOOD PRESSURE: 118 MMHG | TEMPERATURE: 98 F | WEIGHT: 110 LBS | DIASTOLIC BLOOD PRESSURE: 71 MMHG | HEIGHT: 63 IN | OXYGEN SATURATION: 100 % | BODY MASS INDEX: 19.49 KG/M2

## 2019-04-03 DIAGNOSIS — J32.9 SINUSITIS, UNSPECIFIED CHRONICITY, UNSPECIFIED LOCATION: Primary | ICD-10-CM

## 2019-04-03 PROCEDURE — 3008F BODY MASS INDEX DOCD: CPT | Mod: CPTII,S$GLB,, | Performed by: FAMILY MEDICINE

## 2019-04-03 PROCEDURE — 96372 PR INJECTION,THERAP/PROPH/DIAG2ST, IM OR SUBCUT: ICD-10-PCS | Mod: S$GLB,,, | Performed by: FAMILY MEDICINE

## 2019-04-03 PROCEDURE — 99214 PR OFFICE/OUTPT VISIT, EST, LEVL IV, 30-39 MIN: ICD-10-PCS | Mod: 25,S$GLB,, | Performed by: FAMILY MEDICINE

## 2019-04-03 PROCEDURE — 3008F PR BODY MASS INDEX (BMI) DOCUMENTED: ICD-10-PCS | Mod: CPTII,S$GLB,, | Performed by: FAMILY MEDICINE

## 2019-04-03 PROCEDURE — 96372 THER/PROPH/DIAG INJ SC/IM: CPT | Mod: S$GLB,,, | Performed by: FAMILY MEDICINE

## 2019-04-03 PROCEDURE — 99214 OFFICE O/P EST MOD 30 MIN: CPT | Mod: 25,S$GLB,, | Performed by: FAMILY MEDICINE

## 2019-04-03 RX ORDER — AMOXICILLIN 500 MG/1
500 TABLET, FILM COATED ORAL EVERY 12 HOURS
Qty: 20 TABLET | Refills: 0 | Status: SHIPPED | OUTPATIENT
Start: 2019-04-03 | End: 2019-04-13

## 2019-04-03 RX ORDER — BETAMETHASONE SODIUM PHOSPHATE AND BETAMETHASONE ACETATE 3; 3 MG/ML; MG/ML
6 INJECTION, SUSPENSION INTRA-ARTICULAR; INTRALESIONAL; INTRAMUSCULAR; SOFT TISSUE ONCE
Status: COMPLETED | OUTPATIENT
Start: 2019-04-03 | End: 2019-04-03

## 2019-04-03 RX ORDER — AZELASTINE 1 MG/ML
1 SPRAY, METERED NASAL 2 TIMES DAILY
Qty: 30 ML | Refills: 3 | Status: SHIPPED | OUTPATIENT
Start: 2019-04-03 | End: 2020-08-19

## 2019-04-03 RX ADMIN — BETAMETHASONE SODIUM PHOSPHATE AND BETAMETHASONE ACETATE 6 MG: 3; 3 INJECTION, SUSPENSION INTRA-ARTICULAR; INTRALESIONAL; INTRAMUSCULAR; SOFT TISSUE at 01:04

## 2019-04-03 NOTE — PROGRESS NOTES
"Subjective:       Patient ID: Ashley Donnelly is a 58 y.o. female.    Vitals:  height is 5' 3" (1.6 m) and weight is 49.9 kg (110 lb). Her oral temperature is 97.9 °F (36.6 °C). Her blood pressure is 118/71 and her pulse is 80. Her oxygen saturation is 100%.     Chief Complaint: URI    x4 days, Last DayQuil dose this AM @8.    URI    This is a new problem. The current episode started in the past 7 days. The problem has been gradually worsening. Associated symptoms include coughing, sneezing and a sore throat. Pertinent negatives include no congestion, ear pain, nausea, rash, sinus pain, vomiting or wheezing. She has tried acetaminophen for the symptoms. The treatment provided no relief.       Constitution: Positive for chills. Negative for sweating, fatigue and fever.   HENT: Positive for postnasal drip, sore throat and trouble swallowing. Negative for ear pain, congestion, sinus pain, sinus pressure and voice change.    Neck: Negative for painful lymph nodes.   Eyes: Negative for eye redness.   Respiratory: Positive for cough and sputum production. Negative for chest tightness, bloody sputum, COPD, shortness of breath, stridor, wheezing and asthma.    Gastrointestinal: Negative for nausea and vomiting.   Musculoskeletal: Negative for muscle ache.   Skin: Negative for rash.   Allergic/Immunologic: Positive for sneezing. Negative for seasonal allergies and asthma.   Hematologic/Lymphatic: Negative for swollen lymph nodes.       Objective:      Physical Exam   Constitutional: She is oriented to person, place, and time. She appears well-developed and well-nourished. She is cooperative.  Non-toxic appearance. She does not appear ill. No distress.   HENT:   Head: Normocephalic and atraumatic.   Right Ear: Hearing, tympanic membrane, external ear and ear canal normal.   Left Ear: Hearing, tympanic membrane, external ear and ear canal normal.   Nose: Mucosal edema present. No rhinorrhea or nasal deformity. No epistaxis. Right " sinus exhibits no maxillary sinus tenderness and no frontal sinus tenderness. Left sinus exhibits no maxillary sinus tenderness and no frontal sinus tenderness.   Mouth/Throat: Uvula is midline, oropharynx is clear and moist and mucous membranes are normal. No trismus in the jaw. Normal dentition. No uvula swelling. No posterior oropharyngeal erythema.   Eyes: Conjunctivae and lids are normal. No scleral icterus.   Sclera clear bilat   Neck: Trachea normal, full passive range of motion without pain and phonation normal. Neck supple.   Cardiovascular: Normal rate, regular rhythm, normal heart sounds, intact distal pulses and normal pulses.   Pulmonary/Chest: Effort normal and breath sounds normal. No respiratory distress.   Abdominal: Normal appearance. She exhibits no distension. There is no tenderness.   Musculoskeletal: Normal range of motion. She exhibits no edema or deformity.   Neurological: She is alert and oriented to person, place, and time. She exhibits normal muscle tone. Coordination normal.   Skin: Skin is warm, dry and intact. She is not diaphoretic. No pallor.   Psychiatric: She has a normal mood and affect. Her speech is normal and behavior is normal. Judgment and thought content normal. Cognition and memory are normal.   Nursing note and vitals reviewed.      Assessment:       1. Sinusitis, unspecified chronicity, unspecified location        Plan:         Sinusitis, unspecified chronicity, unspecified location    Other orders  -     betamethasone acetate-betamethasone sodium phosphate injection 6 mg  -     azelastine (ASTELIN) 137 mcg (0.1 %) nasal spray; 1 spray (137 mcg total) by Nasal route 2 (two) times daily.  Dispense: 30 mL; Refill: 3  -     amoxicillin (AMOXIL) 500 MG Tab; Take 1 tablet (500 mg total) by mouth every 12 (twelve) hours. for 10 days  Dispense: 20 tablet; Refill: 0        Pocket script given to patient in case symptoms fail to improve or worsen. Pt advised on risk of taking  medication too soon and verbalized understanding.    Explained r/b and patient v/u to fill only if symptoms progress or worsen after maximizing home remedies sheet given and/or explained during encounter.

## 2019-04-09 ENCOUNTER — PATIENT OUTREACH (OUTPATIENT)
Dept: ADMINISTRATIVE | Facility: HOSPITAL | Age: 59
End: 2019-04-09

## 2019-04-15 ENCOUNTER — PATIENT MESSAGE (OUTPATIENT)
Dept: FAMILY MEDICINE | Facility: CLINIC | Age: 59
End: 2019-04-15

## 2019-04-15 DIAGNOSIS — Z00.00 ROUTINE GENERAL MEDICAL EXAMINATION AT A HEALTH CARE FACILITY: Primary | ICD-10-CM

## 2019-04-24 ENCOUNTER — HOSPITAL ENCOUNTER (OUTPATIENT)
Dept: RADIOLOGY | Facility: HOSPITAL | Age: 59
Discharge: HOME | End: 2019-04-24
Attending: NURSE PRACTITIONER
Payer: COMMERCIAL

## 2019-04-24 DIAGNOSIS — Z01.419 WOMEN'S ANNUAL ROUTINE GYNECOLOGICAL EXAMINATION: ICD-10-CM

## 2019-04-24 DIAGNOSIS — Z12.31 VISIT FOR SCREENING MAMMOGRAM: ICD-10-CM

## 2019-04-24 PROCEDURE — 77063 BREAST TOMOSYNTHESIS BI: CPT | Mod: 26,,, | Performed by: RADIOLOGY

## 2019-04-24 PROCEDURE — 77067 SCR MAMMO BI INCL CAD: CPT | Mod: 26,,, | Performed by: RADIOLOGY

## 2019-04-24 PROCEDURE — 77067 SCR MAMMO BI INCL CAD: CPT | Mod: TC,PO

## 2019-04-24 PROCEDURE — 77067 MAMMO DIGITAL SCREENING BILAT WITH TOMOSYNTHESIS_CAD: ICD-10-PCS | Mod: 26,,, | Performed by: RADIOLOGY

## 2019-04-24 PROCEDURE — 77063 MAMMO DIGITAL SCREENING BILAT WITH TOMOSYNTHESIS_CAD: ICD-10-PCS | Mod: 26,,, | Performed by: RADIOLOGY

## 2019-04-29 ENCOUNTER — LAB VISIT (OUTPATIENT)
Dept: LAB | Facility: HOSPITAL | Age: 59
End: 2019-04-29
Attending: INTERNAL MEDICINE
Payer: COMMERCIAL

## 2019-04-29 DIAGNOSIS — Z00.00 ROUTINE GENERAL MEDICAL EXAMINATION AT A HEALTH CARE FACILITY: ICD-10-CM

## 2019-04-29 LAB
ALBUMIN SERPL BCP-MCNC: 3.4 G/DL (ref 3.5–5.2)
ALP SERPL-CCNC: 51 U/L (ref 55–135)
ALT SERPL W/O P-5'-P-CCNC: 14 U/L (ref 10–44)
ANION GAP SERPL CALC-SCNC: 8 MMOL/L (ref 8–16)
AST SERPL-CCNC: 16 U/L (ref 10–40)
BASOPHILS # BLD AUTO: 0.03 K/UL (ref 0–0.2)
BASOPHILS NFR BLD: 0.8 % (ref 0–1.9)
BILIRUB SERPL-MCNC: 0.4 MG/DL (ref 0.1–1)
BUN SERPL-MCNC: 18 MG/DL (ref 6–20)
CALCIUM SERPL-MCNC: 8.8 MG/DL (ref 8.7–10.5)
CHLORIDE SERPL-SCNC: 104 MMOL/L (ref 95–110)
CHOLEST SERPL-MCNC: 162 MG/DL (ref 120–199)
CHOLEST/HDLC SERPL: 2.4 {RATIO} (ref 2–5)
CO2 SERPL-SCNC: 27 MMOL/L (ref 23–29)
CREAT SERPL-MCNC: 0.7 MG/DL (ref 0.5–1.4)
DIFFERENTIAL METHOD: ABNORMAL
EOSINOPHIL # BLD AUTO: 0.1 K/UL (ref 0–0.5)
EOSINOPHIL NFR BLD: 3.5 % (ref 0–8)
ERYTHROCYTE [DISTWIDTH] IN BLOOD BY AUTOMATED COUNT: 12.6 % (ref 11.5–14.5)
EST. GFR  (AFRICAN AMERICAN): >60 ML/MIN/1.73 M^2
EST. GFR  (NON AFRICAN AMERICAN): >60 ML/MIN/1.73 M^2
GLUCOSE SERPL-MCNC: 84 MG/DL (ref 70–110)
HCT VFR BLD AUTO: 40.1 % (ref 37–48.5)
HDLC SERPL-MCNC: 68 MG/DL (ref 40–75)
HDLC SERPL: 42 % (ref 20–50)
HGB BLD-MCNC: 12.8 G/DL (ref 12–16)
IMM GRANULOCYTES # BLD AUTO: 0.01 K/UL (ref 0–0.04)
IMM GRANULOCYTES NFR BLD AUTO: 0.3 % (ref 0–0.5)
LDLC SERPL CALC-MCNC: 82 MG/DL (ref 63–159)
LYMPHOCYTES # BLD AUTO: 2 K/UL (ref 1–4.8)
LYMPHOCYTES NFR BLD: 52.4 % (ref 18–48)
MCH RBC QN AUTO: 31.7 PG (ref 27–31)
MCHC RBC AUTO-ENTMCNC: 31.9 G/DL (ref 32–36)
MCV RBC AUTO: 99 FL (ref 82–98)
MONOCYTES # BLD AUTO: 0.3 K/UL (ref 0.3–1)
MONOCYTES NFR BLD: 9.1 % (ref 4–15)
NEUTROPHILS # BLD AUTO: 1.3 K/UL (ref 1.8–7.7)
NEUTROPHILS NFR BLD: 33.9 % (ref 38–73)
NONHDLC SERPL-MCNC: 94 MG/DL
NRBC BLD-RTO: 0 /100 WBC
PLATELET # BLD AUTO: 224 K/UL (ref 150–350)
PMV BLD AUTO: 11.7 FL (ref 9.2–12.9)
POTASSIUM SERPL-SCNC: 4.1 MMOL/L (ref 3.5–5.1)
PROT SERPL-MCNC: 6.5 G/DL (ref 6–8.4)
RBC # BLD AUTO: 4.04 M/UL (ref 4–5.4)
SODIUM SERPL-SCNC: 139 MMOL/L (ref 136–145)
TRIGL SERPL-MCNC: 60 MG/DL (ref 30–150)
TSH SERPL DL<=0.005 MIU/L-ACNC: 1.1 UIU/ML (ref 0.4–4)
WBC # BLD AUTO: 3.74 K/UL (ref 3.9–12.7)

## 2019-04-29 PROCEDURE — 84443 ASSAY THYROID STIM HORMONE: CPT

## 2019-04-29 PROCEDURE — 80061 LIPID PANEL: CPT

## 2019-04-29 PROCEDURE — 36415 COLL VENOUS BLD VENIPUNCTURE: CPT | Mod: PO

## 2019-04-29 PROCEDURE — 80053 COMPREHEN METABOLIC PANEL: CPT

## 2019-04-29 PROCEDURE — 85025 COMPLETE CBC W/AUTO DIFF WBC: CPT

## 2019-05-02 ENCOUNTER — OFFICE VISIT (OUTPATIENT)
Dept: FAMILY MEDICINE | Facility: CLINIC | Age: 59
End: 2019-05-02
Payer: COMMERCIAL

## 2019-05-02 VITALS
SYSTOLIC BLOOD PRESSURE: 110 MMHG | BODY MASS INDEX: 19.49 KG/M2 | RESPIRATION RATE: 16 BRPM | DIASTOLIC BLOOD PRESSURE: 74 MMHG | TEMPERATURE: 98 F | WEIGHT: 110 LBS | HEIGHT: 63 IN

## 2019-05-02 DIAGNOSIS — Z01.00 EYE EXAM, ROUTINE: ICD-10-CM

## 2019-05-02 DIAGNOSIS — Z00.00 ANNUAL PHYSICAL EXAM: Primary | ICD-10-CM

## 2019-05-02 DIAGNOSIS — M85.872 OSTEOPENIA OF BOTH FEET: ICD-10-CM

## 2019-05-02 DIAGNOSIS — F43.9 SITUATIONAL STRESS: ICD-10-CM

## 2019-05-02 DIAGNOSIS — Z79.890 POSTMENOPAUSAL HRT (HORMONE REPLACEMENT THERAPY): ICD-10-CM

## 2019-05-02 DIAGNOSIS — F41.9 ANXIETY: ICD-10-CM

## 2019-05-02 DIAGNOSIS — M85.871 OSTEOPENIA OF BOTH FEET: ICD-10-CM

## 2019-05-02 PROCEDURE — 99396 PR PREVENTIVE VISIT,EST,40-64: ICD-10-PCS | Mod: S$GLB,,, | Performed by: INTERNAL MEDICINE

## 2019-05-02 PROCEDURE — 99999 PR PBB SHADOW E&M-EST. PATIENT-LVL III: ICD-10-PCS | Mod: PBBFAC,,, | Performed by: INTERNAL MEDICINE

## 2019-05-02 PROCEDURE — 99396 PREV VISIT EST AGE 40-64: CPT | Mod: S$GLB,,, | Performed by: INTERNAL MEDICINE

## 2019-05-02 PROCEDURE — 99999 PR PBB SHADOW E&M-EST. PATIENT-LVL III: CPT | Mod: PBBFAC,,, | Performed by: INTERNAL MEDICINE

## 2019-05-02 RX ORDER — ESCITALOPRAM OXALATE 10 MG/1
10 TABLET ORAL DAILY
Qty: 90 TABLET | Refills: 3 | Status: SHIPPED | OUTPATIENT
Start: 2019-05-02 | End: 2020-07-20 | Stop reason: SDUPTHER

## 2019-05-02 RX ORDER — ONDANSETRON HYDROCHLORIDE 8 MG/1
8 TABLET, FILM COATED ORAL EVERY 6 HOURS PRN
Qty: 30 TABLET | Refills: 2 | Status: SHIPPED | OUTPATIENT
Start: 2019-05-02 | End: 2020-07-20 | Stop reason: SDUPTHER

## 2019-05-02 RX ORDER — ALPRAZOLAM 0.5 MG/1
0.5 TABLET ORAL DAILY PRN
Qty: 20 TABLET | Refills: 0 | Status: SHIPPED | OUTPATIENT
Start: 2019-05-02 | End: 2020-08-19

## 2019-05-02 NOTE — PROGRESS NOTES
Subjective:        Patient ID: Ashley Donnelly is a 58 y.o. female.    Chief Complaint: Annual Exam    HPI   Ashley Donnelly presents for annual exam.    Review of Systems   Constitutional: Negative for activity change and unexpected weight change.   HENT: Negative for hearing loss, rhinorrhea and trouble swallowing.    Eyes: Negative for discharge and visual disturbance (reading glasses; due for eye exam).   Respiratory: Negative for chest tightness, shortness of breath and wheezing.    Cardiovascular: Positive for palpitations (~2x/month; no lightheadedness, chest pain or SOB). Negative for chest pain and leg swelling.   Gastrointestinal: Negative for blood in stool, constipation, diarrhea (loose stools at baseline) and vomiting.   Endocrine: Negative for polydipsia and polyuria.   Genitourinary: Negative for difficulty urinating, dysuria, hematuria, menstrual problem, vaginal bleeding and vaginal discharge.   Musculoskeletal: Positive for arthralgias. Negative for joint swelling and neck pain.   Neurological: Negative for weakness and headaches.   Psychiatric/Behavioral: Negative for confusion and dysphoric mood. The patient is not nervous/anxious (sometimes).            Objective:        Vitals:    05/02/19 0801   BP: 110/74   Resp: 16   Temp: 97.7 °F (36.5 °C)     Physical Exam   Constitutional: She is oriented to person, place, and time. She appears well-developed and well-nourished. No distress.   HENT:   Head: Normocephalic and atraumatic.   Right Ear: External ear normal.   Left Ear: External ear normal.   Nose: Nose normal.   - bilateral ear canals clear, tympanic membranes visualized - normal color and light reflex   Eyes: Conjunctivae and EOM are normal.   Neck: Normal range of motion. Neck supple. No thyromegaly present.   Cardiovascular: Normal rate, regular rhythm and normal heart sounds.   No murmur heard.  Pulmonary/Chest: Effort normal and breath sounds normal. No respiratory distress.   Abdominal: Soft.  She exhibits no distension and no mass. There is no tenderness. There is no guarding.   Musculoskeletal: She exhibits no edema.   Lymphadenopathy:     She has no cervical adenopathy.   Neurological: She is alert and oriented to person, place, and time.   Skin: Skin is warm and dry.   Psychiatric: She has a normal mood and affect. Her behavior is normal. Judgment and thought content normal.   Vitals reviewed.        Most recent lab results reviewed with patient.    Assessment:         1. Annual physical exam    2. Anxiety    3. Situational stress    4. Postmenopausal HRT (hormone replacement therapy)    5. Osteopenia of both feet    6. Eye exam, routine              Plan:         Ashley was seen today for annual exam.    Diagnoses and all orders for this visit:    Annual physical exam  - Recommended Shingrix vaccine.  Check coverage with insurance first, then get vaccine from local pharmacy.    Anxiety  Situational stress: Lexapro 10mg qd, Xanax rarely as needed  -     ALPRAZolam (XANAX) 0.5 MG tablet; Take 1 tablet (0.5 mg total) by mouth daily as needed for Insomnia or Anxiety.    Postmenopausal HRT (hormone replacement therapy): Medroxyprogesterone, Estradiol; f/u gyn    Osteopenia of both feet: low FRAX; repeat DXA next year    Eye exam, routine  -     Ambulatory referral to Optometry    Other orders: zofran as needed for nausea when traveling  -     ondansetron (ZOFRAN) 8 MG tablet; Take 1 tablet (8 mg total) by mouth every 6 (six) hours as needed for Nausea.          Follow up in about 1 year (around 5/2/2020) for annual exam or sooner as needed.    Patient Instructions   Shingrix: shingles vaccine, 2 doses (second dose 2-6 months after the first)    Call your insurance company to find out cost or copay.    You can get this vaccine from your local pharmacy without a prescription.

## 2019-05-08 ENCOUNTER — OFFICE VISIT (OUTPATIENT)
Dept: OPTOMETRY | Facility: CLINIC | Age: 59
End: 2019-05-08
Payer: COMMERCIAL

## 2019-05-08 DIAGNOSIS — H52.4 BILATERAL PRESBYOPIA: ICD-10-CM

## 2019-05-08 DIAGNOSIS — H25.13 NUCLEAR SCLEROSIS OF BOTH EYES: Primary | ICD-10-CM

## 2019-05-08 PROCEDURE — 99999 PR PBB SHADOW E&M-EST. PATIENT-LVL II: ICD-10-PCS | Mod: PBBFAC,,, | Performed by: OPTOMETRIST

## 2019-05-08 PROCEDURE — 92004 PR EYE EXAM, NEW PATIENT,COMPREHESV: ICD-10-PCS | Mod: S$GLB,,, | Performed by: OPTOMETRIST

## 2019-05-08 PROCEDURE — 92004 COMPRE OPH EXAM NEW PT 1/>: CPT | Mod: S$GLB,,, | Performed by: OPTOMETRIST

## 2019-05-08 PROCEDURE — 99999 PR PBB SHADOW E&M-EST. PATIENT-LVL II: CPT | Mod: PBBFAC,,, | Performed by: OPTOMETRIST

## 2019-05-08 NOTE — LETTER
May 8, 2019      Ofe Espinoza MD  [address] [facility] [address]           [place] [facility]  [address]  [place] [address]  Phone: [phone]  Fax: [phone]          Patient: Ashley Donnelly   MR Number: [id number]   YOB: 1960   Date of Visit: 5/8/2019       Dear Dr. Ofe Espinoza:    Thank you for referring Ashley Donnelly to me for evaluation. Attached you will find relevant portions of my assessment and plan of care.    If you have questions, please do not hesitate to call me. I look forward to following Ashley Donnelly along with you.    Sincerely,    Guy Milian, OD    Enclosure  CC:  No Recipients    If you would like to receive this communication electronically, please contact [email or web address] or [phone] to request more information on [employer] Link access.    For providers and/or their staff who would like to refer a patient to [facility], please contact us through our one-stop-shop provider referral line, [facility] , at [phone].    If you feel you have received this communication in error or would no longer like to receive these types of communications, please e-mail [email or web address]

## 2019-05-08 NOTE — PROGRESS NOTES
VICTORINO BRANDT  03/2019 with Dr. Kemp for conjunctivitis and 10-15 yrs. Before   for full exam.  Patient wears OTC +2.00 readers, seem to work fine. Night   driving not as clear, daytime seems fine.   Not using any drops     Last edited by Maylin Willingham on 5/8/2019  8:43 AM. (History)            Assessment /Plan     For exam results, see Encounter Report.    Nuclear sclerosis of both eyes    Bilateral presbyopia      1. Educated pt on presence of cataracts and effects on vision. No surgery at this time. Recheck in one year.  2. Cont with OTC readers.

## 2019-05-28 DIAGNOSIS — B00.9 HERPES: ICD-10-CM

## 2019-05-28 RX ORDER — ACYCLOVIR 200 MG/1
CAPSULE ORAL
Qty: 30 CAPSULE | Refills: 11 | Status: SHIPPED | OUTPATIENT
Start: 2019-05-28 | End: 2020-07-20 | Stop reason: SDUPTHER

## 2020-02-07 RX ORDER — GABAPENTIN 300 MG/1
600 CAPSULE ORAL NIGHTLY
Qty: 180 CAPSULE | Refills: 3 | Status: SHIPPED | OUTPATIENT
Start: 2020-02-07 | End: 2021-01-17

## 2020-02-19 ENCOUNTER — OFFICE VISIT (OUTPATIENT)
Dept: URGENT CARE | Facility: CLINIC | Age: 60
End: 2020-02-19
Payer: COMMERCIAL

## 2020-02-19 VITALS
TEMPERATURE: 98 F | SYSTOLIC BLOOD PRESSURE: 140 MMHG | RESPIRATION RATE: 19 BRPM | WEIGHT: 110 LBS | BODY MASS INDEX: 19.49 KG/M2 | HEART RATE: 73 BPM | OXYGEN SATURATION: 100 % | HEIGHT: 63 IN | DIASTOLIC BLOOD PRESSURE: 71 MMHG

## 2020-02-19 DIAGNOSIS — H10.33 ACUTE BACTERIAL CONJUNCTIVITIS OF BOTH EYES: Primary | ICD-10-CM

## 2020-02-19 PROCEDURE — 99214 PR OFFICE/OUTPT VISIT, EST, LEVL IV, 30-39 MIN: ICD-10-PCS | Mod: S$GLB,,, | Performed by: NURSE PRACTITIONER

## 2020-02-19 PROCEDURE — 99214 OFFICE O/P EST MOD 30 MIN: CPT | Mod: S$GLB,,, | Performed by: NURSE PRACTITIONER

## 2020-02-19 RX ORDER — MOXIFLOXACIN 5 MG/ML
1 SOLUTION/ DROPS OPHTHALMIC 3 TIMES DAILY
Qty: 3 ML | Refills: 0 | Status: SHIPPED | OUTPATIENT
Start: 2020-02-19 | End: 2020-02-26

## 2020-02-19 NOTE — PATIENT INSTRUCTIONS
EYE   If your condition worsens or fails to improve we recommend that you receive another evaluation at the ER immediately or contact your PCP to discuss your concerns or return here. You must understand that you've received an urgent care treatment only and that you may be released before all your medical problems are known or treated. You the patient will arrange for followup care as instructed.   Use the eye drops every 2 hours while awake initially. Once the eye redness decreases after a few days then decrease the frequency to three times a day. Use the eye drops for 24 hours after the last day of eye symptoms.   Throw away makeup.  Do not wear your contact lens ( if you use them) for at least 5 days after you stop having symptoms and are rechecked by your doctor. Throw away the contacts, contact solution and carrying case you were using and start with new material.   If you develop increase eye symptoms or change in your vision seek medical care immediately either with your ophthalomologist or the ER or return here.       Conjunctivitis, Bacterial    You have an infection in the membranes covering the white part of the eye. This part of the eye is called the conjunctiva. The infection is called conjunctivitis. The most common symptoms of conjunctivitis include a thick, pus-like discharge from the eye, swollen eyelids, redness, eyelids sticking together upon awakening, and a gritty or scratchy feeling in the eye. Your infection was caused by bacteria. It may be treated with medicine. With treatment, the infection takes about 7 to 10 days to resolve.  Home care  · Use prescribed antibiotic eye drops or ointment as directed to treat the infection.  · Apply a warm compress (towel soaked in warm water) to the affected eye 3 to 4 times a day. Do this just before applying medicine to the eye.  · Use a warm, wet cloth to wipe away crusting of the eyelids in the morning. This  is caused by mucus drainage during the night. You may also use saline irrigating solution or artificial tears to rinse away mucus in the eye. Do not put a patch over the eye.  · Wash your hands before and after touching the infected eye. This is to prevent spreading the infection to the other eye, and to other people. Do not share your towels or washcloths with others.  · You may use acetaminophen or ibuprofen to control pain, unless another medicine was prescribed. (Note: If you have chronic liver or kidney disease or have ever had a stomach ulcer or gastrointestinal bleeding, talk with your doctor before using these medicines.)  · Do not wear contact lenses until your eyes have healed and all symptoms are gone.  Follow-up care  Follow up with your healthcare provider, or as advised.  When to seek medical advice  Call your healthcare provider right away if any of these occur:  · Worsening vision  · Increasing pain in the eye  · Increasing swelling or redness of the eyelid  · Redness spreading around the eye  Date Last Reviewed: 6/14/2015  © 6351-0508 [employer] [address] [place] [address], [place] [address]. All rights reserved. This information is not intended as a substitute for professional medical care. Always follow your healthcare professional's instructions.

## 2020-02-19 NOTE — PROGRESS NOTES
"Subjective:       Patient ID: Ashley Donnelly is a 59 y.o. female.    Vitals:  height is 5' 3" (1.6 m) and weight is 49.9 kg (110 lb). Her oral temperature is 98.1 °F (36.7 °C). Her blood pressure is 140/71 (abnormal) and her pulse is 73. Her respiration is 19 and oxygen saturation is 100%.     Chief Complaint: Conjunctivitis    Pt c/o L eye redness/discharge that is now spreading to right.  Worse in the mornings with "goopy" drainage that glues shut.  She does not wear contacts.  Denies trauma or injury to the eye.  Denies sinus or URI symptoms.  Denies photophobia.  Denies visual changes.    Conjunctivitis   This is a new problem. The current episode started in the past 7 days (3 days ago). The problem occurs constantly. The problem has been unchanged. Pertinent negatives include no arthralgias, chest pain, chills, congestion, coughing, fatigue, fever, headaches, joint swelling, myalgias, nausea, rash, sore throat, vertigo, vomiting or weakness. Nothing aggravates the symptoms. She has tried nothing for the symptoms.       Constitution: Negative for chills, fatigue and fever.   HENT: Negative for congestion, sinus pain and sore throat.    Neck: Negative for painful lymph nodes.   Cardiovascular: Negative for chest pain and leg swelling.   Eyes: Positive for eye discharge and eye redness. Negative for eye trauma, foreign body in eye, eye itching, eye pain, photophobia, vision loss, double vision, blurred vision and eyelid swelling.   Respiratory: Negative for cough and shortness of breath.    Gastrointestinal: Negative for nausea, vomiting and diarrhea.   Genitourinary: Negative for dysuria, frequency, urgency and history of kidney stones.   Musculoskeletal: Negative for joint pain, joint swelling, muscle cramps and muscle ache.   Skin: Negative for color change, pale, rash and bruising.   Allergic/Immunologic: Negative for seasonal allergies and itching.   Neurological: Negative for dizziness, history of vertigo, " light-headedness, passing out and headaches.   Hematologic/Lymphatic: Negative for swollen lymph nodes.   Psychiatric/Behavioral: Negative for nervous/anxious, sleep disturbance and depression. The patient is not nervous/anxious.        Objective:      Physical Exam   Constitutional: She is oriented to person, place, and time. She appears well-developed and well-nourished. No distress.   HENT:   Head: Normocephalic and atraumatic.   Right Ear: External ear normal.   Left Ear: External ear normal.   Nose: Nose normal.   Mouth/Throat: Oropharynx is clear and moist.   Eyes: Pupils are equal, round, and reactive to light. EOM and lids are normal. Lids are everted and swept, no foreign bodies found. Right eye exhibits no chemosis, no discharge, no exudate and no hordeolum. No foreign body present in the right eye. Left eye exhibits no chemosis, no discharge, no exudate and no hordeolum. No foreign body present in the left eye. Right conjunctiva is injected. Right conjunctiva has no hemorrhage. Left conjunctiva is injected. Left conjunctiva has no hemorrhage. No scleral icterus.   Very mild injection bilaterally   Neck: Trachea normal, full passive range of motion without pain and phonation normal. Neck supple.   Musculoskeletal: Normal range of motion.   Neurological: She is alert and oriented to person, place, and time.   Skin: Skin is warm, dry, intact and not diaphoretic.   Psychiatric: She has a normal mood and affect. Her speech is normal and behavior is normal. Judgment and thought content normal. Cognition and memory are normal.   Nursing note and vitals reviewed.    Hearing/Vision    Vision Screening   Edited by: Raul Arguello IV, MA    Right eye Left eye Both eyes   Without correction 20/40 20/30 20/25              Assessment:       1. Acute bacterial conjunctivitis of both eyes        Plan:         Acute bacterial conjunctivitis of both eyes  -     moxifloxacin (VIGAMOX) 0.5 % ophthalmic solution; Place 1 drop  into both eyes 3 (three) times daily. for 7 days  Dispense: 3 mL; Refill: 0      Patient Instructions                                               EYE   If your condition worsens or fails to improve we recommend that you receive another evaluation at the ER immediately or contact your PCP to discuss your concerns or return here. You must understand that you've received an urgent care treatment only and that you may be released before all your medical problems are known or treated. You the patient will arrange for followup care as instructed.   Use the eye drops every 2 hours while awake initially. Once the eye redness decreases after a few days then decrease the frequency to three times a day. Use the eye drops for 24 hours after the last day of eye symptoms.   Throw away makeup.  Do not wear your contact lens ( if you use them) for at least 5 days after you stop having symptoms and are rechecked by your doctor. Throw away the contacts, contact solution and carrying case you were using and start with new material.   If you develop increase eye symptoms or change in your vision seek medical care immediately either with your ophthalomologist or the ER or return here.       Conjunctivitis, Bacterial    You have an infection in the membranes covering the white part of the eye. This part of the eye is called the conjunctiva. The infection is called conjunctivitis. The most common symptoms of conjunctivitis include a thick, pus-like discharge from the eye, swollen eyelids, redness, eyelids sticking together upon awakening, and a gritty or scratchy feeling in the eye. Your infection was caused by bacteria. It may be treated with medicine. With treatment, the infection takes about 7 to 10 days to resolve.  Home care  · Use prescribed antibiotic eye drops or ointment as directed to treat the infection.  · Apply a warm compress (towel soaked in warm water) to the affected eye 3 to 4 times a day. Do this just before applying  medicine to the eye.  · Use a warm, wet cloth to wipe away crusting of the eyelids in the morning. This is caused by mucus drainage during the night. You may also use saline irrigating solution or artificial tears to rinse away mucus in the eye. Do not put a patch over the eye.  · Wash your hands before and after touching the infected eye. This is to prevent spreading the infection to the other eye, and to other people. Do not share your towels or washcloths with others.  · You may use acetaminophen or ibuprofen to control pain, unless another medicine was prescribed. (Note: If you have chronic liver or kidney disease or have ever had a stomach ulcer or gastrointestinal bleeding, talk with your doctor before using these medicines.)  · Do not wear contact lenses until your eyes have healed and all symptoms are gone.  Follow-up care  Follow up with your healthcare provider, or as advised.  When to seek medical advice  Call your healthcare provider right away if any of these occur:  · Worsening vision  · Increasing pain in the eye  · Increasing swelling or redness of the eyelid  · Redness spreading around the eye  Date Last Reviewed: 6/14/2015  © 6906-6570 [email or web address]. [address] [place] [address], [place] [address]. All rights reserved. This information is not intended as a substitute for professional medical care. Always follow your healthcare professional's instructions.

## 2020-03-08 DIAGNOSIS — Z79.890 POSTMENOPAUSAL HRT (HORMONE REPLACEMENT THERAPY): ICD-10-CM

## 2020-03-09 RX ORDER — MEDROXYPROGESTERONE ACETATE 5 MG/1
TABLET ORAL
Qty: 90 TABLET | Refills: 3 | Status: SHIPPED | OUTPATIENT
Start: 2020-03-09 | End: 2020-07-20

## 2020-04-07 DIAGNOSIS — Z79.890 POSTMENOPAUSAL HRT (HORMONE REPLACEMENT THERAPY): ICD-10-CM

## 2020-04-07 RX ORDER — ESTRADIOL 2 MG/1
2 TABLET ORAL DAILY
Qty: 90 TABLET | Refills: 0 | Status: SHIPPED | OUTPATIENT
Start: 2020-04-07 | End: 2020-06-30

## 2020-06-17 ENCOUNTER — PATIENT OUTREACH (OUTPATIENT)
Dept: ADMINISTRATIVE | Facility: HOSPITAL | Age: 60
End: 2020-06-17

## 2020-06-30 DIAGNOSIS — Z12.31 VISIT FOR SCREENING MAMMOGRAM: Primary | ICD-10-CM

## 2020-06-30 DIAGNOSIS — Z79.890 POSTMENOPAUSAL HRT (HORMONE REPLACEMENT THERAPY): ICD-10-CM

## 2020-06-30 RX ORDER — ESTRADIOL 2 MG/1
2 TABLET ORAL DAILY
Qty: 90 TABLET | Refills: 0 | Status: SHIPPED | OUTPATIENT
Start: 2020-06-30 | End: 2020-07-20 | Stop reason: SDUPTHER

## 2020-07-20 ENCOUNTER — OFFICE VISIT (OUTPATIENT)
Dept: OBSTETRICS AND GYNECOLOGY | Facility: CLINIC | Age: 60
End: 2020-07-20
Payer: COMMERCIAL

## 2020-07-20 ENCOUNTER — HOSPITAL ENCOUNTER (OUTPATIENT)
Dept: RADIOLOGY | Facility: HOSPITAL | Age: 60
Discharge: HOME | End: 2020-07-20
Attending: NURSE PRACTITIONER
Payer: COMMERCIAL

## 2020-07-20 ENCOUNTER — IMMUNIZATION (OUTPATIENT)
Dept: PHARMACY | Facility: CLINIC | Age: 60
End: 2020-07-20
Payer: COMMERCIAL

## 2020-07-20 VITALS — BODY MASS INDEX: 20.16 KG/M2 | HEIGHT: 63 IN | WEIGHT: 113.75 LBS

## 2020-07-20 DIAGNOSIS — Z79.890 POSTMENOPAUSAL HRT (HORMONE REPLACEMENT THERAPY): ICD-10-CM

## 2020-07-20 DIAGNOSIS — Z01.419 ENCOUNTER FOR WELL WOMAN EXAM WITH ROUTINE GYNECOLOGICAL EXAM: Primary | ICD-10-CM

## 2020-07-20 DIAGNOSIS — F41.9 ANXIETY: ICD-10-CM

## 2020-07-20 DIAGNOSIS — B00.9 HERPES: ICD-10-CM

## 2020-07-20 DIAGNOSIS — Z12.31 VISIT FOR SCREENING MAMMOGRAM: ICD-10-CM

## 2020-07-20 PROCEDURE — 77063 BREAST TOMOSYNTHESIS BI: CPT | Mod: 26,,, | Performed by: RADIOLOGY

## 2020-07-20 PROCEDURE — 77063 MAMMO DIGITAL SCREENING BILAT WITH TOMOSYNTHESIS_CAD: ICD-10-PCS | Mod: 26,,, | Performed by: RADIOLOGY

## 2020-07-20 PROCEDURE — 77067 MAMMO DIGITAL SCREENING BILAT WITH TOMOSYNTHESIS_CAD: ICD-10-PCS | Mod: 26,,, | Performed by: RADIOLOGY

## 2020-07-20 PROCEDURE — 3008F BODY MASS INDEX DOCD: CPT | Mod: CPTII,S$GLB,, | Performed by: OBSTETRICS & GYNECOLOGY

## 2020-07-20 PROCEDURE — 99396 PREV VISIT EST AGE 40-64: CPT | Mod: S$GLB,,, | Performed by: OBSTETRICS & GYNECOLOGY

## 2020-07-20 PROCEDURE — 99999 PR PBB SHADOW E&M-EST. PATIENT-LVL III: ICD-10-PCS | Mod: PBBFAC,,, | Performed by: OBSTETRICS & GYNECOLOGY

## 2020-07-20 PROCEDURE — 77067 SCR MAMMO BI INCL CAD: CPT | Mod: 26,,, | Performed by: RADIOLOGY

## 2020-07-20 PROCEDURE — 3008F PR BODY MASS INDEX (BMI) DOCUMENTED: ICD-10-PCS | Mod: CPTII,S$GLB,, | Performed by: OBSTETRICS & GYNECOLOGY

## 2020-07-20 PROCEDURE — 99396 PR PREVENTIVE VISIT,EST,40-64: ICD-10-PCS | Mod: S$GLB,,, | Performed by: OBSTETRICS & GYNECOLOGY

## 2020-07-20 PROCEDURE — 77067 SCR MAMMO BI INCL CAD: CPT | Mod: TC,PN

## 2020-07-20 PROCEDURE — 99999 PR PBB SHADOW E&M-EST. PATIENT-LVL III: CPT | Mod: PBBFAC,,, | Performed by: OBSTETRICS & GYNECOLOGY

## 2020-07-20 RX ORDER — ESCITALOPRAM OXALATE 10 MG/1
10 TABLET ORAL DAILY
Qty: 90 TABLET | Refills: 4 | Status: SHIPPED | OUTPATIENT
Start: 2020-07-20 | End: 2020-07-22 | Stop reason: SDUPTHER

## 2020-07-20 RX ORDER — ONDANSETRON HYDROCHLORIDE 8 MG/1
8 TABLET, FILM COATED ORAL EVERY 6 HOURS PRN
Qty: 30 TABLET | Refills: 2 | Status: SHIPPED | OUTPATIENT
Start: 2020-07-20 | End: 2022-04-05

## 2020-07-20 RX ORDER — ACYCLOVIR 200 MG/1
400 CAPSULE ORAL 2 TIMES DAILY
Qty: 30 CAPSULE | Refills: 11 | Status: SHIPPED | OUTPATIENT
Start: 2020-07-20 | End: 2021-01-14

## 2020-07-20 RX ORDER — MEDROXYPROGESTERONE ACETATE 10 MG/1
10 TABLET ORAL DAILY
Qty: 90 TABLET | Refills: 4 | Status: SHIPPED | OUTPATIENT
Start: 2020-07-20 | End: 2021-10-11

## 2020-07-20 RX ORDER — ESTRADIOL 2 MG/1
2 TABLET ORAL DAILY
Qty: 90 TABLET | Refills: 4 | Status: SHIPPED | OUTPATIENT
Start: 2020-07-20 | End: 2021-09-30

## 2020-07-20 NOTE — PROGRESS NOTES
History & Physical  Gynecology      SUBJECTIVE:     Chief Complaint: Gynecologic Exam       History of Present Illness:    Ashley Donnelly is a 60 y.o. female  here for annual routine Pap and checkup. Patient's last menstrual period was 2015..  She has no unusual complaints.      Pt is on HRT for hot flashes and night sweats.  Pt denies any postmenopausal bleeding.  Notes 1 episode of light spotting several months ago.  Has not had any bleeding since then.  denies break through bleeding.   denies vaginal itching or irritation.  denies vaginal discharge.    She is sexually active with 1 partner    History of abnormal pap: Yes - 30 years ago.  Had cryotherapy.  No issues since then  Last Pap: (19)  Last MMG: Yes - scheduled for today  Last Colonoscopy:  Yes -         Review of patient's allergies indicates:   Allergen Reactions    Compazine [prochlorperazine edisylate] Other (See Comments)     Facial drooping       Past Medical History:   Diagnosis Date    Anxiety     Arthritis     Basal cell carcinoma 2014    anterior chest    Herpes simplex     fever blisters    History of abnormal cervical Pap smear     Hormone replacement therapy (HRT)     PONV (postoperative nausea and vomiting)     Seasonal allergies      Past Surgical History:   Procedure Laterality Date    AUGMENTATION OF BREAST      BREAST SURGERY      Breast Augmentation, X2    COLONOSCOPY N/A 2018    Procedure: COLONOSCOPY;  Surgeon: Mariano Kapoor MD;  Location: [address]);  Service: Endoscopy;  Laterality: N/A;    EPIDURAL STEROID INJECTION INTO CERVICAL SPINE N/A 2019    Procedure: Injection-steroid-epidural-cervical;  Surgeon: Liam Morgan MD;  Location[facility];  Service: Pain Management;  Laterality: N/A;    NECK MASS EXCISION Right     benign    SKIN CANCER EXCISION N/A 2014    anterior chest for basal cell     SKIN CANCER EXCISION      BCC of scalp     OB History         1    Para   1    Term   1            AB        Living           SAB        TAB        Ectopic        Multiple        Live Births                   Family History   Problem Relation Age of Onset    Heart disease Mother         chf    Hypertension Mother     Kidney disease Mother     Hyperlipidemia Mother     Cataracts Mother     Heart disease Father         afib    Hypertension Father     Anemia Father     Cataracts Father     No Known Problems Sister     Heart disease Brother         CAD    No Known Problems Daughter     No Known Problems Brother     No Known Problems Brother     Colon cancer Maternal Grandmother     Cancer Maternal Grandmother     Breast cancer Neg Hx     Ovarian cancer Neg Hx     Amblyopia Neg Hx     Blindness Neg Hx     Diabetes Neg Hx     Glaucoma Neg Hx     Macular degeneration Neg Hx     Retinal detachment Neg Hx     Strabismus Neg Hx     Stroke Neg Hx     Thyroid disease Neg Hx      Social History     Tobacco Use    Smoking status: Never Smoker    Smokeless tobacco: Never Used   Substance Use Topics    Alcohol use: Yes     Comment: 2 gibran per month    Drug use: No       Current Outpatient Medications   Medication Sig    acyclovir (ZOVIRAX) 200 MG capsule Take 2 capsules (400 mg total) by mouth 2 (two) times daily.    biotin 5 mg Cap Take 1 capsule by mouth once daily.    estradioL (ESTRACE) 2 MG tablet Take 1 tablet (2 mg total) by mouth once daily.    fluticasone (FLONASE) 50 mcg/actuation nasal spray 2 sprays by Each Nare route once daily.    gabapentin (NEURONTIN) 300 MG capsule TAKE 2 CAPSULES (600 MG TOTAL) BY MOUTH EVERY EVENING.    multivitamin capsule Take 1 capsule by mouth once daily.    ondansetron (ZOFRAN) 8 MG tablet Take 1 tablet (8 mg total) by mouth every 6 (six) hours as needed for Nausea.    VITAMIN B COMPLEX NO.12-NIACIN ORAL Take 1 tablet by mouth once daily.    ALPRAZolam (XANAX) 0.5 MG tablet Take 1 tablet (0.5 mg total)  by mouth daily as needed for Insomnia or Anxiety. (Patient not taking: Reported on 2/19/2020)    azelastine (ASTELIN) 137 mcg (0.1 %) nasal spray 1 spray (137 mcg total) by Nasal route 2 (two) times daily. (Patient not taking: Reported on 2/19/2020)    escitalopram oxalate (LEXAPRO) 10 MG tablet Take 1 tablet (10 mg total) by mouth once daily.    medroxyPROGESTERone (PROVERA) 10 MG tablet Take 1 tablet (10 mg total) by mouth once daily.    varicella-zoster gE-AS01B, PF, (SHINGRIX, PF,) 50 mcg/0.5 mL injection Inject 0.5 mLs into the muscle once. For one dose. for 1 dose     No current facility-administered medications for this visit.          Review of Systems:  Review of Systems   Constitutional: Negative for activity change, appetite change, chills, fatigue, fever and unexpected weight change.   Respiratory: Negative for cough, shortness of breath and wheezing.    Cardiovascular: Negative for chest pain and leg swelling.   Gastrointestinal: Negative for abdominal pain, blood in stool, constipation, diarrhea, nausea and vomiting.   Endocrine: Negative for diabetes, hair loss and hot flashes.   Genitourinary: Negative for decreased libido, dyspareunia, dysuria, frequency, vaginal bleeding, vaginal discharge, vaginal pain, postcoital bleeding and postmenopausal bleeding.   Musculoskeletal: Negative for back pain.   Integumentary:  Negative for acne, hair changes, breast mass, nipple discharge and breast skin changes.   Neurological: Negative for headaches.   Psychiatric/Behavioral: Negative for sleep disturbance. The patient is not nervous/anxious.    Breast: Negative for mass, mastodynia, nipple discharge and skin changes       OBJECTIVE:     Physical Exam:  Physical Exam  Constitutional:       Appearance: She is well-developed.   HENT:      Head: Normocephalic and atraumatic.   Eyes:      General: No scleral icterus.        Right eye: No discharge.         Left eye: No discharge.      Conjunctiva/sclera:  Conjunctivae normal.   Pulmonary:      Effort: Pulmonary effort is normal.      Breath sounds: No stridor.   Chest:      Chest wall: No mass or tenderness.      Breasts: Breasts are symmetrical.         Right: No inverted nipple, mass, nipple discharge, skin change or tenderness.         Left: No inverted nipple, mass, nipple discharge, skin change or tenderness.   Abdominal:      General: There is no distension.      Palpations: Abdomen is soft.      Tenderness: There is no abdominal tenderness.   Genitourinary:     Labia:         Right: No rash, tenderness, lesion or injury.         Left: No rash, tenderness, lesion or injury.       Vagina: Normal.      Cervix: No cervical motion tenderness, discharge or friability.      Adnexa:         Right: No mass, tenderness or fullness.          Left: No mass, tenderness or fullness.        Comments: Normal external genitalia.  Normal hair distribution.  Urethral meatus normal. No cervical lesions or masses.  No vaginal bleeding noted.  No adnexal or uterine tenderness.  No palpable adnexal masses.  Musculoskeletal: Normal range of motion.   Skin:     General: Skin is warm and dry.   Neurological:      Mental Status: She is alert and oriented to person, place, and time.   Psychiatric:         Behavior: Behavior normal.         Thought Content: Thought content normal.         Judgment: Judgment normal.           ASSESSMENT:       ICD-10-CM ICD-9-CM    1. Encounter for well woman exam with routine gynecological exam  Z01.419 V72.31    2. Herpes  B00.9 054.9 acyclovir (ZOVIRAX) 200 MG capsule   3. Anxiety  F41.9 300.00 escitalopram oxalate (LEXAPRO) 10 MG tablet   4. Postmenopausal HRT (hormone replacement therapy)  Z79.890 V07.4 estradioL (ESTRACE) 2 MG tablet      medroxyPROGESTERone (PROVERA) 10 MG tablet          Plan:      Ashley was seen today for gynecologic exam.    Diagnoses and all orders for this visit:    Encounter for well woman exam with routine gynecological  exam  - pap done 2019.  Next cotesting 2024  - MMG scheduled for today  - Cscope up to date  - Needs Shingles vaccine.  Will send down to pharmacy to get shot today    Postmenopausal HRT (hormone replacement therapy)  - Counseled about HRT and WHI findings.  Pt has been on them for a while.  Notes 1 episode of spotting.  Will increase provera to 10mg.  If still has bleeding issues, will consider switching to prometrium instead.  - Pt to let us know if starts bleeding more.  May need EMBx  -     estradioL (ESTRACE) 2 MG tablet; Take 1 tablet (2 mg total) by mouth once daily.  -     medroxyPROGESTERone (PROVERA) 10 MG tablet; Take 1 tablet (10 mg total) by mouth once daily.    Herpes  -     acyclovir (ZOVIRAX) 200 MG capsule; Take 2 capsules (400 mg total) by mouth 2 (two) times daily.    Anxiety  -     escitalopram oxalate (LEXAPRO) 10 MG tablet; Take 1 tablet (10 mg total) by mouth once daily.    Other orders  -     ondansetron (ZOFRAN) 8 MG tablet; Take 1 tablet (8 mg total) by mouth every 6 (six) hours as needed for Nausea.    No orders of the defined types were placed in this encounter.      Follow up in about 1 year (around 7/20/2021) for annual.     Counseling time: 30 minutes    Juany Nance

## 2020-07-22 DIAGNOSIS — F41.9 ANXIETY: ICD-10-CM

## 2020-07-24 RX ORDER — ESCITALOPRAM OXALATE 10 MG/1
10 TABLET ORAL DAILY
Qty: 30 TABLET | Refills: 0 | Status: SHIPPED | OUTPATIENT
Start: 2020-07-24 | End: 2020-08-19 | Stop reason: SDUPTHER

## 2020-08-19 ENCOUNTER — OFFICE VISIT (OUTPATIENT)
Dept: PRIMARY CARE CLINIC | Facility: CLINIC | Age: 60
End: 2020-08-19
Payer: COMMERCIAL

## 2020-08-19 VITALS
RESPIRATION RATE: 19 BRPM | OXYGEN SATURATION: 98 % | HEIGHT: 63 IN | HEART RATE: 68 BPM | BODY MASS INDEX: 19.61 KG/M2 | SYSTOLIC BLOOD PRESSURE: 103 MMHG | DIASTOLIC BLOOD PRESSURE: 62 MMHG | WEIGHT: 110.69 LBS | TEMPERATURE: 99 F

## 2020-08-19 DIAGNOSIS — F41.9 ANXIETY: ICD-10-CM

## 2020-08-19 DIAGNOSIS — Z79.890 POSTMENOPAUSAL HRT (HORMONE REPLACEMENT THERAPY): ICD-10-CM

## 2020-08-19 DIAGNOSIS — B00.9 HSV INFECTION: ICD-10-CM

## 2020-08-19 DIAGNOSIS — M54.12 CERVICAL RADICULOPATHY: ICD-10-CM

## 2020-08-19 DIAGNOSIS — Z13.220 SCREENING FOR LIPOID DISORDERS: ICD-10-CM

## 2020-08-19 DIAGNOSIS — Z00.00 NORMAL PHYSICAL EXAM, ROUTINE: Primary | ICD-10-CM

## 2020-08-19 PROCEDURE — 99999 PR PBB SHADOW E&M-EST. PATIENT-LVL IV: ICD-10-PCS | Mod: PBBFAC,,, | Performed by: INTERNAL MEDICINE

## 2020-08-19 PROCEDURE — 99396 PREV VISIT EST AGE 40-64: CPT | Mod: S$GLB,,, | Performed by: INTERNAL MEDICINE

## 2020-08-19 PROCEDURE — 3008F BODY MASS INDEX DOCD: CPT | Mod: CPTII,S$GLB,, | Performed by: INTERNAL MEDICINE

## 2020-08-19 PROCEDURE — 99999 PR PBB SHADOW E&M-EST. PATIENT-LVL IV: CPT | Mod: PBBFAC,,, | Performed by: INTERNAL MEDICINE

## 2020-08-19 PROCEDURE — 99396 PR PREVENTIVE VISIT,EST,40-64: ICD-10-PCS | Mod: S$GLB,,, | Performed by: INTERNAL MEDICINE

## 2020-08-19 PROCEDURE — 3008F PR BODY MASS INDEX (BMI) DOCUMENTED: ICD-10-PCS | Mod: CPTII,S$GLB,, | Performed by: INTERNAL MEDICINE

## 2020-08-19 RX ORDER — ESCITALOPRAM OXALATE 10 MG/1
10 TABLET ORAL DAILY
Qty: 90 TABLET | Refills: 3 | Status: SHIPPED | OUTPATIENT
Start: 2020-08-19 | End: 2021-08-20

## 2020-08-19 NOTE — PROGRESS NOTES
[facility] Note    Chief Complaint      Chief Complaint   Patient presents with    Annual Exam       History of Present Illness      Ashley Donnelly is a 60 y.o. WF with HSV, Basal call carcinoma, Postmenopausal HRT, Osteopenia,  DDD cervical spine,  Bulging cervial disk, Cervical Radiculopathy presents to est PCP and for well visit.     HSV - Pt on Acyclovir prn.  Trigger - sun.     Basal cell Ca - has fu 8/31/20    Cervical Radiculopathy - Pt on gabapentin 600 mg QHS.  Fu by Dr. Morgan, Pain Mgmnt.     Anxiety - Pt on Lexapro 10 mg/d.    Allergic rhinitis - Pt takes Flonase prn.     Postmenopausal HRT - Pt on Provera and Estrace. Managed by OB/GYN.    HCM - Flu - 10/17/19;  Tdap - 5/4/15;  PCV 13 - none;  PVX 23 - none;  Shingrix - 7/20/20;  MGM - 7/20/20 - repeat 1 yr;  DEXA - 4/3/18 - neg;  PAP - 2/6/19 - neg; Hep C Screen - 3/19/18 - neg;  HIV Screen - ?; C-scope - 4/26/18 -Diverticulosis - repeat 10 yrs;  Prev PCP - Dr. Espinoza; Pain Mgmnt - Dr. Morgan; Ob/GYN - Dr. Nance; Derm - Dr. Mcdonald; Ophtho - ?    Past Medical History:  Past Medical History:   Diagnosis Date    Anxiety     Arthritis     Basal cell carcinoma 11/2014    anterior chest    Herpes simplex     fever blisters    History of abnormal cervical Pap smear     Hormone replacement therapy (HRT)     PONV (postoperative nausea and vomiting)     Seasonal allergies        Past Surgical History:   has a past surgical history that includes Neck mass excision (Right, 1998); Skin cancer excision (N/A, 11/2014); Breast surgery; Skin cancer excision (2017); Colonoscopy (N/A, 4/26/2018); Epidural steroid injection into cervical spine (N/A, 2/11/2019); and Augmentation of breast.    Family History:  family history includes Anemia in her father; Cancer in her maternal grandmother; Cataracts in her father and mother; Colon cancer in her maternal grandmother; Heart disease in her brother, father, and mother; Hyperlipidemia in her  "mother; Hypertension in her father and mother; Kidney disease in her mother; No Known Problems in her brother, brother, daughter, and sister.     Social History:  Social History     Tobacco Use    Smoking status: Never Smoker    Smokeless tobacco: Never Used   Substance Use Topics    Alcohol use: Yes     Frequency: 2-4 times a month     Drinks per session: 1 or 2     Binge frequency: Never     Comment: 2 gibran per month    Drug use: No       Review of Systems   Constitutional: Negative for chills, diaphoresis and fever.   HENT: Negative for hearing loss and tinnitus.    Eyes: Negative for blurred vision, double vision and discharge.        Wears readers   Respiratory: Negative for cough, shortness of breath and wheezing.    Cardiovascular: Positive for palpitations. Negative for chest pain.        Can occur once/month - lasts 30 seconds.  No assoc CP, SOB, or dizziness   Gastrointestinal: Positive for diarrhea. Negative for abdominal pain, blood in stool, constipation, heartburn, melena, nausea and vomiting.        Takes immodium prn. Can occur weekly - ca get 1 arge soft stool.  Rec trial of probiotic. "My sister has it too"   Genitourinary: Negative for dysuria, frequency and hematuria.   Musculoskeletal: Negative for joint pain and myalgias.   Skin: Negative for itching and rash.   Neurological: Negative for dizziness and headaches.   Endo/Heme/Allergies: Negative for polydipsia. Does not bruise/bleed easily.   Psychiatric/Behavioral: Negative for depression. The patient is not nervous/anxious.         Medications:  Outpatient Encounter Medications as of 8/19/2020   Medication Sig Note Dispense Refill    acyclovir (ZOVIRAX) 200 MG capsule Take 2 capsules (400 mg total) by mouth 2 (two) times daily.  30 capsule 11    biotin 5 mg Cap Take 1 capsule by mouth once daily.       escitalopram oxalate (LEXAPRO) 10 MG tablet Take 1 tablet (10 mg total) by mouth once daily.  90 tablet 3    estradioL (ESTRACE) 2 MG " tablet Take 1 tablet (2 mg total) by mouth once daily.  90 tablet 4    fluticasone (FLONASE) 50 mcg/actuation nasal spray 2 sprays by Each Nare route once daily. 4/12/2016: Received from: External Pharmacy  6    gabapentin (NEURONTIN) 300 MG capsule TAKE 2 CAPSULES (600 MG TOTAL) BY MOUTH EVERY EVENING.  180 capsule 3    medroxyPROGESTERone (PROVERA) 10 MG tablet Take 1 tablet (10 mg total) by mouth once daily.  90 tablet 4    multivitamin capsule Take 1 capsule by mouth once daily.       ondansetron (ZOFRAN) 8 MG tablet Take 1 tablet (8 mg total) by mouth every 6 (six) hours as needed for Nausea.  30 tablet 2    VITAMIN B COMPLEX NO.12-NIACIN ORAL Take 1 tablet by mouth once daily.       [DISCONTINUED] escitalopram oxalate (LEXAPRO) 10 MG tablet Take 1 tablet (10 mg total) by mouth once daily.  30 tablet 0    [DISCONTINUED] ALPRAZolam (XANAX) 0.5 MG tablet Take 1 tablet (0.5 mg total) by mouth daily as needed for Insomnia or Anxiety. (Patient not taking: Reported on 2/19/2020)  20 tablet 0    [DISCONTINUED] azelastine (ASTELIN) 137 mcg (0.1 %) nasal spray 1 spray (137 mcg total) by Nasal route 2 (two) times daily. (Patient not taking: Reported on 2/19/2020)  30 mL 3     No facility-administered encounter medications on file as of 8/19/2020.        Current Outpatient Medications:     acyclovir (ZOVIRAX) 200 MG capsule, Take 2 capsules (400 mg total) by mouth 2 (two) times daily., Disp: 30 capsule, Rfl: 11    biotin 5 mg Cap, Take 1 capsule by mouth once daily., Disp: , Rfl:     escitalopram oxalate (LEXAPRO) 10 MG tablet, Take 1 tablet (10 mg total) by mouth once daily., Disp: 90 tablet, Rfl: 3    estradioL (ESTRACE) 2 MG tablet, Take 1 tablet (2 mg total) by mouth once daily., Disp: 90 tablet, Rfl: 4    fluticasone (FLONASE) 50 mcg/actuation nasal spray, 2 sprays by Each Nare route once daily., Disp: , Rfl: 6    gabapentin (NEURONTIN) 300 MG capsule, TAKE 2 CAPSULES (600 MG TOTAL) BY MOUTH EVERY  "EVENING., Disp: 180 capsule, Rfl: 3    medroxyPROGESTERone (PROVERA) 10 MG tablet, Take 1 tablet (10 mg total) by mouth once daily., Disp: 90 tablet, Rfl: 4    multivitamin capsule, Take 1 capsule by mouth once daily., Disp: , Rfl:     ondansetron (ZOFRAN) 8 MG tablet, Take 1 tablet (8 mg total) by mouth every 6 (six) hours as needed for Nausea., Disp: 30 tablet, Rfl: 2    VITAMIN B COMPLEX NO.12-NIACIN ORAL, Take 1 tablet by mouth once daily., Disp: , Rfl:     Allergies:  Review of patient's allergies indicates:   Allergen Reactions    Compazine [prochlorperazine edisylate] Other (See Comments)     Facial drooping       Health Maintenance:  Immunization History   Administered Date(s) Administered    Influenza - Quadrivalent - PF (6 months and older) 09/11/2018, 10/17/2019    Tdap 05/04/2015    Zoster Recombinant 07/20/2020      Health Maintenance   Topic Date Due    DEXA SCAN  04/03/2020    Mammogram  07/20/2022    Lipid Panel  04/29/2024    TETANUS VACCINE  05/04/2025    Hepatitis C Screening  Completed      Objective:      Vital Signs  Temp: 98.5 °F (36.9 °C)  Pulse: 68  Resp: 19  SpO2: 98 %  BP: 103/62  BP Location: Left arm  Patient Position: Sitting  Pain Score: 0-No pain  Height and Weight  Height: 5' 3" (160 cm)  Weight: 50.2 kg (110 lb 10.7 oz)  BSA (Calculated - sq m): 1.49 sq meters  BMI (Calculated): 19.6  Weight in (lb) to have BMI = 25: 140.8]    Laboratory:  CBC:  Recent Labs   Lab 03/15/18  0837 04/29/19  0811   WBC 4.48 3.74 L   RBC 4.20 4.04   Hemoglobin 13.3 12.8   Hematocrit 39.8 40.1   Platelets 216 224   Mean Corpuscular Volume 95 99 H   Mean Corpuscular Hemoglobin 31.7 H 31.7 H   Mean Corpuscular Hemoglobin Conc 33.4 31.9 L       CMP:  Recent Labs   Lab 03/15/18  0837 04/29/19  0811   Glucose 86 84   Calcium 9.3 8.8   Albumin 3.8 3.4 L   Total Protein 6.9 6.5   Sodium 138 139   Potassium 4.2 4.1   CO2 28 27   Chloride 102 104   BUN, Bld 18 18   Creatinine 0.7 0.7   eGFR if African " American >60.0 >60.0   eGFR if non African American >60.0 >60.0   Alkaline Phosphatase 45 L 51 L   ALT 17 14   AST 19 16   Total Bilirubin 0.4 0.4       LIPIDS:  Recent Labs   Lab 03/15/18  0837 04/29/19  0811   TSH 1.122 1.096   HDL 72 68   Cholesterol 172 162   Triglycerides 67 60   LDL Cholesterol 86.6 82.0   Hdl/Cholesterol Ratio 41.9 42.0   Non-HDL Cholesterol 100 94   Total Cholesterol/HDL Ratio 2.4 2.4       TSH:  Recent Labs   Lab 03/15/18  0837 04/29/19  0811   TSH 1.122 1.096     Other:   Lab Results   Component Value Date    FERRITIN 54 09/20/2013     Lab Results   Component Value Date    HEPCAB Negative 03/19/2018       Physical Exam  Vitals signs reviewed.   Constitutional:       General: She is not in acute distress.     Appearance: Normal appearance. She is not ill-appearing, toxic-appearing or diaphoretic.   HENT:      Head: Normocephalic and atraumatic.      Right Ear: Tympanic membrane normal.      Left Ear: Tympanic membrane normal.   Eyes:      Extraocular Movements: Extraocular movements intact.      Conjunctiva/sclera: Conjunctivae normal.      Pupils: Pupils are equal, round, and reactive to light.      Comments: ani early cataracts?   Neck:      Musculoskeletal: Normal range of motion and neck supple.      Vascular: No carotid bruit.   Cardiovascular:      Rate and Rhythm: Normal rate and regular rhythm.      Pulses: Normal pulses.      Heart sounds: Normal heart sounds.   Pulmonary:      Effort: Pulmonary effort is normal. No respiratory distress.      Breath sounds: Normal breath sounds. No wheezing.   Abdominal:      General: Bowel sounds are normal. There is no distension.      Palpations: Abdomen is soft.      Tenderness: There is no abdominal tenderness. There is no guarding or rebound.   Musculoskeletal: Normal range of motion.   Lymphadenopathy:      Cervical: No cervical adenopathy.   Skin:     General: Skin is warm and dry.   Neurological:      General: No focal deficit present.       Mental Status: She is alert and oriented to person, place, and time.   Psychiatric:         Mood and Affect: Mood normal.         Behavior: Behavior normal.             Assessment:       1. Normal physical exam, routine    2. Anxiety    3. Cervical radiculopathy    4. HSV infection    5. Postmenopausal HRT (hormone replacement therapy)    6. Screening for lipoid disorders        Ashley Donnelly is a 60 y.o.female with:    1. Normal physical exam, routine  - CBC auto differential; Future  - Comprehensive metabolic panel; Future  - T4, free; Future  - TSH; Future  - Performed today.  Will check Basic labs.  RTC in 1 yr for fu or sooner if needed    2. Anxiety  - escitalopram oxalate (LEXAPRO) 10 MG tablet; Take 1 tablet (10 mg total) by mouth once daily.  Dispense: 90 tablet; Refill: 3  - Stable.  Cont current regimen.    3. Cervical radiculopathy  - Stable.  Cont current regimen.    4. HSV infection  - Stable.  Cont current regimen.    5. Postmenopausal HRT (hormone replacement therapy)  - Stable.  Cont current regimen.    6. Screening for lipoid disorders  - Lipid Panel; Future       Chronic conditions status updated as per HPI.  Other than changes above, cont current medications and maintain follow up with specialists.  Return to clinic in 12 months for well visit or sooner if needed.    Ashley Hairston MD  [facility]                  Answers for HPI/ROS submitted by the patient on 8/17/2020   activity change: No  trouble swallowing: No  chest tightness: No  difficulty urinating: No  dysphoric mood: No

## 2020-09-14 ENCOUNTER — LAB VISIT (OUTPATIENT)
Dept: LAB | Facility: HOSPITAL | Age: 60
End: 2020-09-14
Attending: INTERNAL MEDICINE
Payer: COMMERCIAL

## 2020-09-14 DIAGNOSIS — Z13.220 SCREENING FOR LIPOID DISORDERS: ICD-10-CM

## 2020-09-14 DIAGNOSIS — Z00.00 NORMAL PHYSICAL EXAM, ROUTINE: ICD-10-CM

## 2020-09-14 LAB
ALBUMIN SERPL BCP-MCNC: 3.8 G/DL (ref 3.5–5.2)
ALP SERPL-CCNC: 43 U/L (ref 55–135)
ALT SERPL W/O P-5'-P-CCNC: 11 U/L (ref 10–44)
ANION GAP SERPL CALC-SCNC: 8 MMOL/L (ref 8–16)
AST SERPL-CCNC: 16 U/L (ref 10–40)
BASOPHILS # BLD AUTO: 0.04 K/UL (ref 0–0.2)
BASOPHILS NFR BLD: 0.9 % (ref 0–1.9)
BILIRUB SERPL-MCNC: 0.5 MG/DL (ref 0.1–1)
BUN SERPL-MCNC: 17 MG/DL (ref 8–23)
CALCIUM SERPL-MCNC: 8.7 MG/DL (ref 8.7–10.5)
CHLORIDE SERPL-SCNC: 102 MMOL/L (ref 95–110)
CHOLEST SERPL-MCNC: 172 MG/DL (ref 120–199)
CHOLEST/HDLC SERPL: 2.5 {RATIO} (ref 2–5)
CO2 SERPL-SCNC: 26 MMOL/L (ref 23–29)
CREAT SERPL-MCNC: 0.8 MG/DL (ref 0.5–1.4)
DIFFERENTIAL METHOD: ABNORMAL
EOSINOPHIL # BLD AUTO: 0.1 K/UL (ref 0–0.5)
EOSINOPHIL NFR BLD: 1.3 % (ref 0–8)
ERYTHROCYTE [DISTWIDTH] IN BLOOD BY AUTOMATED COUNT: 13 % (ref 11.5–14.5)
EST. GFR  (AFRICAN AMERICAN): >60 ML/MIN/1.73 M^2
EST. GFR  (NON AFRICAN AMERICAN): >60 ML/MIN/1.73 M^2
GLUCOSE SERPL-MCNC: 87 MG/DL (ref 70–110)
HCT VFR BLD AUTO: 40.6 % (ref 37–48.5)
HDLC SERPL-MCNC: 70 MG/DL (ref 40–75)
HDLC SERPL: 40.7 % (ref 20–50)
HGB BLD-MCNC: 13 G/DL (ref 12–16)
IMM GRANULOCYTES # BLD AUTO: 0 K/UL (ref 0–0.04)
IMM GRANULOCYTES NFR BLD AUTO: 0 % (ref 0–0.5)
LDLC SERPL CALC-MCNC: 83.2 MG/DL (ref 63–159)
LYMPHOCYTES # BLD AUTO: 2.2 K/UL (ref 1–4.8)
LYMPHOCYTES NFR BLD: 49.3 % (ref 18–48)
MCH RBC QN AUTO: 32.6 PG (ref 27–31)
MCHC RBC AUTO-ENTMCNC: 32 G/DL (ref 32–36)
MCV RBC AUTO: 102 FL (ref 82–98)
MONOCYTES # BLD AUTO: 0.4 K/UL (ref 0.3–1)
MONOCYTES NFR BLD: 8.1 % (ref 4–15)
NEUTROPHILS # BLD AUTO: 1.8 K/UL (ref 1.8–7.7)
NEUTROPHILS NFR BLD: 40.4 % (ref 38–73)
NONHDLC SERPL-MCNC: 102 MG/DL
NRBC BLD-RTO: 0 /100 WBC
PLATELET # BLD AUTO: 215 K/UL (ref 150–350)
PMV BLD AUTO: 11.4 FL (ref 9.2–12.9)
POTASSIUM SERPL-SCNC: 4.1 MMOL/L (ref 3.5–5.1)
PROT SERPL-MCNC: 6.9 G/DL (ref 6–8.4)
RBC # BLD AUTO: 3.99 M/UL (ref 4–5.4)
SODIUM SERPL-SCNC: 136 MMOL/L (ref 136–145)
T4 FREE SERPL-MCNC: 0.97 NG/DL (ref 0.71–1.51)
TRIGL SERPL-MCNC: 94 MG/DL (ref 30–150)
TSH SERPL DL<=0.005 MIU/L-ACNC: 1.87 UIU/ML (ref 0.4–4)
WBC # BLD AUTO: 4.46 K/UL (ref 3.9–12.7)

## 2020-09-14 PROCEDURE — 80061 LIPID PANEL: CPT

## 2020-09-14 PROCEDURE — 85025 COMPLETE CBC W/AUTO DIFF WBC: CPT

## 2020-09-14 PROCEDURE — 84439 ASSAY OF FREE THYROXINE: CPT

## 2020-09-14 PROCEDURE — 84443 ASSAY THYROID STIM HORMONE: CPT

## 2020-09-14 PROCEDURE — 80053 COMPREHEN METABOLIC PANEL: CPT

## 2020-09-14 PROCEDURE — 36415 COLL VENOUS BLD VENIPUNCTURE: CPT | Mod: PN

## 2020-09-15 NOTE — PROGRESS NOTES
I sent pt a my chart message -  I reviewed your  labs.  Your thyroid functions are normal.  Your Cholesterol looked good.  Your kidney function and liver functions looked good.  You are not anemic. No further recommendations at this time.    Dr. MOFFETT

## 2020-09-24 ENCOUNTER — PATIENT MESSAGE (OUTPATIENT)
Dept: PRIMARY CARE CLINIC | Facility: CLINIC | Age: 60
End: 2020-09-24

## 2020-09-28 ENCOUNTER — IMMUNIZATION (OUTPATIENT)
Dept: PHARMACY | Facility: CLINIC | Age: 60
End: 2020-09-28
Payer: COMMERCIAL

## 2020-11-23 PROBLEM — Z13.220 SCREENING FOR LIPOID DISORDERS: Status: RESOLVED | Noted: 2020-08-19 | Resolved: 2020-11-23

## 2020-11-23 PROBLEM — Z00.00 NORMAL PHYSICAL EXAM, ROUTINE: Status: RESOLVED | Noted: 2020-08-19 | Resolved: 2020-11-23

## 2020-12-18 ENCOUNTER — OFFICE VISIT (OUTPATIENT)
Dept: URGENT CARE | Facility: CLINIC | Age: 60
End: 2020-12-18
Payer: COMMERCIAL

## 2020-12-18 VITALS
WEIGHT: 110 LBS | HEIGHT: 63 IN | SYSTOLIC BLOOD PRESSURE: 122 MMHG | HEART RATE: 73 BPM | DIASTOLIC BLOOD PRESSURE: 80 MMHG | BODY MASS INDEX: 19.49 KG/M2 | OXYGEN SATURATION: 99 %

## 2020-12-18 DIAGNOSIS — R05.9 COUGH: Primary | ICD-10-CM

## 2020-12-18 DIAGNOSIS — Z03.818 ENCNTR FOR OBS FOR SUSP EXPSR TO OTH BIOLG AGENTS RULED OUT: ICD-10-CM

## 2020-12-18 DIAGNOSIS — U07.1 COVID-19 VIRUS DETECTED: ICD-10-CM

## 2020-12-18 LAB
CTP QC/QA: YES
SARS-COV-2 RDRP RESP QL NAA+PROBE: POSITIVE

## 2020-12-18 PROCEDURE — U0002 COVID-19 LAB TEST NON-CDC: HCPCS | Mod: QW,S$GLB,, | Performed by: PHYSICIAN ASSISTANT

## 2020-12-18 PROCEDURE — 3008F PR BODY MASS INDEX (BMI) DOCUMENTED: ICD-10-PCS | Mod: CPTII,S$GLB,, | Performed by: PHYSICIAN ASSISTANT

## 2020-12-18 PROCEDURE — 99214 OFFICE O/P EST MOD 30 MIN: CPT | Mod: S$GLB,,, | Performed by: PHYSICIAN ASSISTANT

## 2020-12-18 PROCEDURE — U0002: ICD-10-PCS | Mod: QW,S$GLB,, | Performed by: PHYSICIAN ASSISTANT

## 2020-12-18 PROCEDURE — 99214 PR OFFICE/OUTPT VISIT, EST, LEVL IV, 30-39 MIN: ICD-10-PCS | Mod: S$GLB,,, | Performed by: PHYSICIAN ASSISTANT

## 2020-12-18 PROCEDURE — 3008F BODY MASS INDEX DOCD: CPT | Mod: CPTII,S$GLB,, | Performed by: PHYSICIAN ASSISTANT

## 2020-12-18 NOTE — PATIENT INSTRUCTIONS
o You have tested positive for COVID-19 today.  Please note that patients who test positive for COVID-19 are required by the CDC to undergo isolation for 10 days after their symptoms first began.  o This isolation starts from the day you first developed symptoms, not the day of your positive test. For example, if your symptoms began on a Monday but tested positive on the following Wednesday, your 10-day isolation begins from that Monday, not the Wednesday you tested positive.  o However, if you are asymptomatic (a person who does not have any symptoms) and COVID-19 positive, your 10-day isolation begins on the day you tested positive, regardless of exposure date.  o Also, per the CDC guidelines, once your 10 days have passed, and you have not had fever greater than 100.4F in the last 24 hours without taking any fever reducers such as Tylenol (Acetaminophen) or Motrin (Ibuprofen), you may return to your normal activities including social distancing, wearing masks, and frequent handwashing - YOU DO NOT NEED ANOTHER TEST IN ORDER TO END YOUR QUARANTINE.        Prevention steps for patients with confirmed COVID-19       Stay home and stay away from family members and friends. The CDC says, you can leave home after these three things have happened: 1) You have had no fever for at least 24 hours (that is one full day of no fever without the use of medicine that reduces fevers) 2) AND other symptoms have improved (for example, when your cough or shortness of breath have improved) 3) AND at least 10 days have passed since your symptoms first appeared.   Separate yourself from other people and animals in your home.   Call ahead before visiting your doctor.   Wear a facemask.   Cover your coughs and sneezes.   Wash your hands often with soap and water; hand  can be used, too.   Avoid sharing personal household items.   Wipe down surfaces used daily.   Monitor your symptoms. Seek prompt medical attention if  your illness is worsening (e.g., difficulty breathing).    Before seeking care, call your healthcare provider.   If you have a medical emergency and need to call 911, notify the dispatch personnel that you have, or are being evaluated for COVID-19. If possible, put on a facemask before emergency medical services arrive.        Recommended precautions for household members, intimate partners, and caregivers in a home setting of a patient with symptomatic laboratory-confirmed COVID-19 or a patient under investigation.  Household members, intimate partners, and caregivers in the home setting awaiting tests results have close contact with a person with symptomatic, laboratory-confirmed COVID-19 or a person under investigation. Close contacts should monitor their health; they should call their provider right away if they develop symptoms suggestive of COVID-19 (e.g., fever, cough, shortness of breath).    Close contacts should also follow these recommendations:   Make sure that you understand and can help the patient follow their provider's instructions for medication(s) and care. You should help the patient with basic needs in the home and provide support for getting groceries, prescriptions, and other personal needs.   Monitor the patient's symptoms. If the patient is getting sicker, call his or her healthcare provider and tell them that the patient has laboratory-confirmed COVID-19. If the patient has a medical emergency and you need to call 911, notify the dispatch personnel that the patient has, or is being evaluated for COVID-19.   Household members should stay in another room or be  from the patient. Household members should use a separate bedroom and bathroom, if available.   Prohibit visitors.   Household members should care for any pets in the home.   Make sure that shared spaces in the home have good air flow, such as by an air conditioner or an opened window, weather permitting.   Perform  hand hygiene frequently. Wash your hands often with soap and water for at least 20 seconds or use an alcohol-based hand  (that contains > 60% alcohol) covering all surfaces of your hands and rubbing them together until they feel dry. Soap and water should be used preferentially.   Avoid touching your eyes, nose, and mouth.   The patient should wear a facemask. If the patient is not able to wear a facemask (for example, because it causes trouble breathing), caregivers should wear a mask when they are in the same room as the patient.   Wear a disposable facemask and gloves when you touch or have contact with the patient's blood, stool, or body fluids, such as saliva, sputum, nasal mucus, vomit, urine.  o Throw out disposable facemasks and gloves after using them. Do not reuse.  o When removing personal protective equipment, first remove and dispose of gloves. Then, immediately clean your hands with soap and water or alcohol-based hand . Next, remove and dispose of facemask, and immediately clean your hands again with soap and water or alcohol-based hand .   You should not share dishes, drinking glasses, cups, eating utensils, towels, bedding, or other items with the patient. After the patient uses these items, you should wash them thoroughly (see below Wash laundry thoroughly).   Clean all high-touch surfaces, such as counters, tabletops, doorknobs, bathroom fixtures, toilets, phones, keyboards, tablets, and bedside tables, every day. Also, clean any surfaces that may have blood, stool, or body fluids on them.   Use a household cleaning spray or wipe, according to the label instructions. Labels contain instructions for safe and effective use of the cleaning product including precautions you should take when applying the product, such as wearing gloves and making sure you have good ventilation during use of the product.   Wash laundry thoroughly.  o Immediately remove and wash  clothes or bedding that have blood, stool, or body fluids on them.  o Wear disposable gloves while handling soiled items and keep soiled items away from your body. Clean your hands (with soap and water or an alcohol-based hand ) immediately after removing your gloves.  o Read and follow directions on labels of laundry or clothing items and detergent. In general, using a normal laundry detergent according to washing machine instructions and dry thoroughly using the warmest temperatures recommended on the clothing label.   Place all used disposable gloves, facemasks, and other contaminated items in a lined container before disposing of them with other household waste. Clean your hands (with soap and water or an alcohol-based hand ) immediately after handling these items. Soap and water should be used preferentially if hands are visibly dirty.   Discuss any additional questions with your state or local health department or healthcare provider. Check available hours when contacting your local health department.    For more information see CDC link below.      https://www.cdc.gov/coronavirus/2019-ncov/hcp/guidance-prevent-spread.html#precautions        Sources:  [facility] [place], [place] Department of Health and Hospitals    Symptomatic treatment:    Alternate Tylenol and Ibuprofen every 3 hrs  salt water gargles to soothe throat  Honey/lemon water to soothe throat  Cold-eeze helps to reduce the duration of URI symptoms  Elderberry to reduce duration of URI symptoms  Cepachol helps to numb the discomfort in throat  Nasal saline spray reduces inflammation and dryness  Warm face compresses/hot showers as often as you can to open sinuses   Vicks vapor rub at night  Flonase OTC or Nasacort OTC  Simple foods like chicken noodle soup help hydrate  Delsym helps with coughing at night  Zyrtec/Claritin during the day and Benadryl at night may help if allergy component   Rest as much as you can  Your symptoms will likely  last 5-7 days, maybe longer depending on how it affects your body.  You are contagious for 5-7 days, so minimize contact with others to reduce the spread to others. Stay hydrated. Dehydration is preventable but is one of the main reasons why you will feel so badly. Water is preferred, but drink pedialyte, Gatorade, Vickey, Squencher or propel if you need a different taste. Avoid Sugary Drinks!    Antibiotics are not needed unless a complication(such as Otitis Media, Bacterial sinus infection or pneumonia). Taking antibiotics for Flu/Cold is not supported by evidence-based medicine and can expose you to unnecessary side effects of the medication, such as anaphylaxis.   If you experience any of the following, go to the nearest Emergency Department:   - Chest pain, shortness of breath, wheezing or difficulty breathing,    - Severe headache, face, neck or ear pain, New rash   - Fever over 101.5º F (38.6 C) for more than three days   - Confusion, behavior change or seizure   - Severe weakness or dizziness    Thank you for enrolling in [facility]. Please follow the instructions below to securely access your online medical record. [employer] allows you to send messages to your doctor, view your test results, renew your prescriptions, schedule appointments, and more.     How Do I Sign Up?  1. In your Internet browser, go to [email or web address].  2. In the lower right of the page, click the Activate Now link located under the Have Access Code? Title.  3. Enter your [facility] Access Code exactly as it appears below. You will not need to use this code after youve completed the sign-up process. If you do not sign up before the expiration date, you must request a new code.  [facility] Access Code: Activation code not generated  Current Patient Portal Status: Active    4. Enter Date of Birth (mm/dd/yyyy) as indicated and click the Next button. You will be taken to the next sign-up page.  5. Create a [facility] ID. This will be your new  [facility] login ID and cannot be changed, so think of one that is secure and easy to remember.  6. Create a [facility] password.  Your password must be at least 8 characters long and contain at least 1 letter and 1 number.  You can change your password at any time.  7. Enter your Password Reset Question and Answer, then click the Next button.   8. Enter your e-mail address. You will receive e-mail notification when new information is available in [facility].  9. Click Sign Up. You can now view your medical record.     Additional Information  If you have questions, you can email [email or web address] or call [phone]  to talk to our [facility] staff. Remember, [facility] is NOT to be used for urgent needs. For medical emergencies, dial 911.     If not allergic,take tylenol (acetominophen) for fever control, chills, or body aches every 4 hours. Do not exceed 4000 mg/ day.If not allergic, take Motrin (Ibuprofen) every 4 hours for fever, chills, pain or inflammation. Do not exceed 2400 mg/day. You can alternate taking tylenol and motrin.    If you were prescribed a narcotic medication, do not drive or operate heavy equipment or machinery while taking these medications.  You must understand that you've received an Urgent Care treatment only and that you may be released before all your medical problems are known or treated. You, the patient, will arrange for follow up care as instructed.  Follow up with your PCP or specialty clinic as directed in the next 1-2 weeks if not improved or as needed.  You can call [phone] to schedule an appointment with the appropriate provider.  If your condition worsens we recommend that you receive another evaluation at the emergency room immediately or contact your primary medical clinics after hours call service to discuss your concerns.    Please return here or go to the Emergency Department for any concerns or worsening of condition.    If you have been referred to another  provider and wish to call to check on the status of your referral, please call [facility] at [phone]

## 2020-12-18 NOTE — PROGRESS NOTES
"Subjective:       Patient ID: Ashley Donnelly is a 60 y.o. female.    Vitals:  height is 5' 3" (1.6 m) and weight is 49.9 kg (110 lb). Her blood pressure is 122/80 and her pulse is 73. Her oxygen saturation is 99%.     Chief Complaint: Cough    Pt. Presents to clinic with dry cough and headache X 2 days.Pt. has been taking tylenol.     Cough  This is a new problem. The current episode started yesterday. The problem has been gradually worsening. The problem occurs hourly. The cough is non-productive. Associated symptoms include headaches. Pertinent negatives include no chills, ear pain, eye redness, fever, hemoptysis, myalgias, nasal congestion, postnasal drip, rash, sore throat, shortness of breath or wheezing. Nothing aggravates the symptoms. She has tried nothing for the symptoms. The treatment provided no relief.       Constitution: Negative for chills, sweating, fatigue and fever.   HENT: Negative for ear pain, congestion, postnasal drip, sinus pain, sinus pressure, sore throat and voice change.    Neck: Negative for painful lymph nodes.   Eyes: Negative for eye redness.   Respiratory: Positive for cough. Negative for chest tightness, sputum production, bloody sputum, COPD, shortness of breath, stridor, wheezing and asthma.    Gastrointestinal: Negative for nausea and vomiting.   Musculoskeletal: Negative for muscle ache.   Skin: Negative for rash.   Allergic/Immunologic: Negative for seasonal allergies and asthma.   Neurological: Positive for headaches.   Hematologic/Lymphatic: Negative for swollen lymph nodes.       Objective:      Physical Exam   Constitutional: Patient is oriented to person, place, and time. Patient appears well-developed. Patient is cooperative.  Non-toxic appearance. Patient does not appear ill. No distress.   HENT:   Head: Normocephalic and atraumatic.   Right Ear: Hearing and external ear normal.   Left Ear: Hearing and external ear normal.   Nose: Nose normal. No mucosal edema, rhinorrhea " or nasal deformity. No epistaxis.   Mouth/Throat: Uvula is midline, oropharynx is clear and moist and mucous membranes are normal. No trismus in the jaw. Normal dentition. No uvula swelling. No posterior oropharyngeal erythema.   Eyes: Conjunctivae and lids are normal. Right eye exhibits no discharge. Left eye exhibits no discharge. No scleral icterus.   Neck: Trachea normal, normal range of motion, full passive range of motion without pain and phonation normal.   Cardiovascular: Normal rate and regular rhythm.   Pulmonary/Chest: Effort normal and breath sounds normal. No respiratory distress.   Abdominal: Normal appearance. She exhibits no distension. There is no abdominal tenderness (none reported).   Musculoskeletal: Normal range of motion.         General: No deformity.   Neurological: Patient is alert and oriented to person, place, and time. Patient exhibits normal muscle tone. Coordination normal.   Skin: Skin is warm, dry, intact, not diaphoretic and not pale.   Psychiatric: Patient's speech is normal and behavior is normal. Judgment and thought content normal.   Nursing note and vitals reviewed.       Assessment:       1. Cough    2. Encntr for obs for susp expsr to oth biolg agents ruled out        Plan:         Cough  -     POCT COVID-19 Rapid Screening    Encntr for obs for susp expsr to oth biolg agents ruled out  -     POCT COVID-19 Rapid Screening      All applicable EKG, medical records, labs, imaging reviewed in Epic and discussed with patient.     Results for orders placed or performed in visit on 12/18/20   POCT COVID-19 Rapid Screening   Result Value Ref Range    POC Rapid COVID Positive (A) Negative     Acceptable Yes        Patient Instructions   o You have tested positive for COVID-19 today.  Please note that patients who test positive for COVID-19 are required by the CDC to undergo isolation for 10 days after their symptoms first began.  o This isolation starts from the day you  first developed symptoms, not the day of your positive test. For example, if your symptoms began on a Monday but tested positive on the following Wednesday, your 10-day isolation begins from that Monday, not the Wednesday you tested positive.  o However, if you are asymptomatic (a person who does not have any symptoms) and COVID-19 positive, your 10-day isolation begins on the day you tested positive, regardless of exposure date.  o Also, per the CDC guidelines, once your 10 days have passed, and you have not had fever greater than 100.4F in the last 24 hours without taking any fever reducers such as Tylenol (Acetaminophen) or Motrin (Ibuprofen), you may return to your normal activities including social distancing, wearing masks, and frequent handwashing - YOU DO NOT NEED ANOTHER TEST IN ORDER TO END YOUR QUARANTINE.        Prevention steps for patients with confirmed COVID-19       Stay home and stay away from family members and friends. The CDC says, you can leave home after these three things have happened: 1) You have had no fever for at least 24 hours (that is one full day of no fever without the use of medicine that reduces fevers) 2) AND other symptoms have improved (for example, when your cough or shortness of breath have improved) 3) AND at least 10 days have passed since your symptoms first appeared.   Separate yourself from other people and animals in your home.   Call ahead before visiting your doctor.   Wear a facemask.   Cover your coughs and sneezes.   Wash your hands often with soap and water; hand  can be used, too.   Avoid sharing personal household items.   Wipe down surfaces used daily.   Monitor your symptoms. Seek prompt medical attention if your illness is worsening (e.g., difficulty breathing).    Before seeking care, call your healthcare provider.   If you have a medical emergency and need to call 911, notify the dispatch personnel that you have, or are being evaluated  for COVID-19. If possible, put on a facemask before emergency medical services arrive.        Recommended precautions for household members, intimate partners, and caregivers in a home setting of a patient with symptomatic laboratory-confirmed COVID-19 or a patient under investigation.  Household members, intimate partners, and caregivers in the home setting awaiting tests results have close contact with a person with symptomatic, laboratory-confirmed COVID-19 or a person under investigation. Close contacts should monitor their health; they should call their provider right away if they develop symptoms suggestive of COVID-19 (e.g., fever, cough, shortness of breath).    Close contacts should also follow these recommendations:   Make sure that you understand and can help the patient follow their provider's instructions for medication(s) and care. You should help the patient with basic needs in the home and provide support for getting groceries, prescriptions, and other personal needs.   Monitor the patient's symptoms. If the patient is getting sicker, call his or her healthcare provider and tell them that the patient has laboratory-confirmed COVID-19. If the patient has a medical emergency and you need to call 911, notify the dispatch personnel that the patient has, or is being evaluated for COVID-19.   Household members should stay in another room or be  from the patient. Household members should use a separate bedroom and bathroom, if available.   Prohibit visitors.   Household members should care for any pets in the home.   Make sure that shared spaces in the home have good air flow, such as by an air conditioner or an opened window, weather permitting.   Perform hand hygiene frequently. Wash your hands often with soap and water for at least 20 seconds or use an alcohol-based hand  (that contains > 60% alcohol) covering all surfaces of your hands and rubbing them together until they feel  dry. Soap and water should be used preferentially.   Avoid touching your eyes, nose, and mouth.   The patient should wear a facemask. If the patient is not able to wear a facemask (for example, because it causes trouble breathing), caregivers should wear a mask when they are in the same room as the patient.   Wear a disposable facemask and gloves when you touch or have contact with the patient's blood, stool, or body fluids, such as saliva, sputum, nasal mucus, vomit, urine.  o Throw out disposable facemasks and gloves after using them. Do not reuse.  o When removing personal protective equipment, first remove and dispose of gloves. Then, immediately clean your hands with soap and water or alcohol-based hand . Next, remove and dispose of facemask, and immediately clean your hands again with soap and water or alcohol-based hand .   You should not share dishes, drinking glasses, cups, eating utensils, towels, bedding, or other items with the patient. After the patient uses these items, you should wash them thoroughly (see below Wash laundry thoroughly).   Clean all high-touch surfaces, such as counters, tabletops, doorknobs, bathroom fixtures, toilets, phones, keyboards, tablets, and bedside tables, every day. Also, clean any surfaces that may have blood, stool, or body fluids on them.   Use a household cleaning spray or wipe, according to the label instructions. Labels contain instructions for safe and effective use of the cleaning product including precautions you should take when applying the product, such as wearing gloves and making sure you have good ventilation during use of the product.   Wash laundry thoroughly.  o Immediately remove and wash clothes or bedding that have blood, stool, or body fluids on them.  o Wear disposable gloves while handling soiled items and keep soiled items away from your body. Clean your hands (with soap and water or an alcohol-based hand )  immediately after removing your gloves.  o Read and follow directions on labels of laundry or clothing items and detergent. In general, using a normal laundry detergent according to washing machine instructions and dry thoroughly using the warmest temperatures recommended on the clothing label.   Place all used disposable gloves, facemasks, and other contaminated items in a lined container before disposing of them with other household waste. Clean your hands (with soap and water or an alcohol-based hand ) immediately after handling these items. Soap and water should be used preferentially if hands are visibly dirty.   Discuss any additional questions with your state or local health department or healthcare provider. Check available hours when contacting your local health department.    For more information see CDC link below.      https://www.cdc.gov/coronavirus/2019-ncov/hcp/guidance-prevent-spread.html#precautions        Sources:  [facility], [facility] and \[facility]\""    Symptomatic treatment:    Alternate Tylenol and Ibuprofen every 3 hrs  salt water gargles to soothe throat  Honey/lemon water to soothe throat  Cold-eeze helps to reduce the duration of URI symptoms  Elderberry to reduce duration of URI symptoms  Cepachol helps to numb the discomfort in throat  Nasal saline spray reduces inflammation and dryness  Warm face compresses/hot showers as often as you can to open sinuses   Vicks vapor rub at night  Flonase OTC or Nasacort OTC  Simple foods like chicken noodle soup help hydrate  Delsym helps with coughing at night  Zyrtec/Claritin during the day and Benadryl at night may help if allergy component   Rest as much as you can  Your symptoms will likely last 5-7 days, maybe longer depending on how it affects your body.  You are contagious for 5-7 days, so minimize contact with others to reduce the spread to others. Stay hydrated. Dehydration is preventable but is one of the main reasons  why you will feel so badly. Water is preferred, but drink pedialyte, Gatorade, Vickey, Squencher or propel if you need a different taste. Avoid Sugary Drinks!    Antibiotics are not needed unless a complication(such as Otitis Media, Bacterial sinus infection or pneumonia). Taking antibiotics for Flu/Cold is not supported by evidence-based medicine and can expose you to unnecessary side effects of the medication, such as anaphylaxis.   If you experience any of the following, go to the nearest Emergency Department:   - Chest pain, shortness of breath, wheezing or difficulty breathing,    - Severe headache, face, neck or ear pain, New rash   - Fever over 101.5º F (38.6 C) for more than three days   - Confusion, behavior change or seizure   - Severe weakness or dizziness    Thank you for enrolling in [facility]. Please follow the instructions below to securely access your online medical record. [employer] allows you to send messages to your doctor, view your test results, renew your prescriptions, schedule appointments, and more.     How Do I Sign Up?  1. In your Internet browser, go to [email or web address].  2. In the lower right of the page, click the Activate Now link located under the Have Access Code? Title.  3. Enter your [facility] Access Code exactly as it appears below. You will not need to use this code after youve completed the sign-up process. If you do not sign up before the expiration date, you must request a new code.  [facility] Access Code: Activation code not generated  Current Patient Portal Status: Active    4. Enter Date of Birth (mm/dd/yyyy) as indicated and click the Next button. You will be taken to the next sign-up page.  5. Create a [facility] ID. This will be your new [facility] login ID and cannot be changed, so think of one that is secure and easy to remember.  6. Create a [facility] password.  Your password must be at least 8 characters long and contain at least 1 letter and 1 number.  You can change your  password at any time.  7. Enter your Password Reset Question and Answer, then click the Next button.   8. Enter your e-mail address. You will receive e-mail notification when new information is available in [email or web address].  9. Click Sign Up. You can now view your medical record.     Additional Information  If you have questions, you can email [email or web address] or call [phone]  to talk to our [employer] staff. Remember, [facility] is NOT to be used for urgent needs. For medical emergencies, dial 911.     If not allergic,take tylenol (acetominophen) for fever control, chills, or body aches every 4 hours. Do not exceed 4000 mg/ day.If not allergic, take Motrin (Ibuprofen) every 4 hours for fever, chills, pain or inflammation. Do not exceed 2400 mg/day. You can alternate taking tylenol and motrin.    If you were prescribed a narcotic medication, do not drive or operate heavy equipment or machinery while taking these medications.  You must understand that you've received an Urgent Care treatment only and that you may be released before all your medical problems are known or treated. You, the patient, will arrange for follow up care as instructed.  Follow up with your PCP or specialty clinic as directed in the next 1-2 weeks if not improved or as needed.  You can call [phone] to schedule an appointment with the appropriate provider.  If your condition worsens we recommend that you receive another evaluation at the emergency room immediately or contact your primary medical clinics after hours call service to discuss your concerns.    Please return here or go to the Emergency Department for any concerns or worsening of condition.    If you have been referred to another provider and wish to call to check on the status of your referral, please call [facility] Sched[facility] at [phone]

## 2021-01-04 ENCOUNTER — PATIENT MESSAGE (OUTPATIENT)
Dept: PRIMARY CARE CLINIC | Facility: CLINIC | Age: 61
End: 2021-01-04

## 2021-08-12 ENCOUNTER — PATIENT MESSAGE (OUTPATIENT)
Dept: OBSTETRICS AND GYNECOLOGY | Facility: CLINIC | Age: 61
End: 2021-08-12

## 2021-08-12 DIAGNOSIS — Z12.31 SCREENING MAMMOGRAM, ENCOUNTER FOR: Primary | ICD-10-CM

## 2021-08-18 ENCOUNTER — HOSPITAL ENCOUNTER (OUTPATIENT)
Dept: RADIOLOGY | Facility: HOSPITAL | Age: 61
Discharge: HOME | End: 2021-08-18
Attending: OBSTETRICS & GYNECOLOGY
Payer: COMMERCIAL

## 2021-08-18 VITALS — WEIGHT: 110 LBS | BODY MASS INDEX: 19.49 KG/M2

## 2021-08-18 DIAGNOSIS — Z12.31 SCREENING MAMMOGRAM, ENCOUNTER FOR: ICD-10-CM

## 2021-08-18 PROCEDURE — 77067 SCR MAMMO BI INCL CAD: CPT | Mod: TC,PN

## 2021-08-18 PROCEDURE — 77067 MAMMO DIGITAL SCREENING BILAT WITH TOMO: ICD-10-PCS | Mod: 26,,, | Performed by: RADIOLOGY

## 2021-08-18 PROCEDURE — 77063 MAMMO DIGITAL SCREENING BILAT WITH TOMO: ICD-10-PCS | Mod: 26,,, | Performed by: RADIOLOGY

## 2021-08-18 PROCEDURE — 77067 SCR MAMMO BI INCL CAD: CPT | Mod: 26,,, | Performed by: RADIOLOGY

## 2021-08-18 PROCEDURE — 77063 BREAST TOMOSYNTHESIS BI: CPT | Mod: 26,,, | Performed by: RADIOLOGY

## 2021-08-20 DIAGNOSIS — F41.9 ANXIETY: ICD-10-CM

## 2021-08-20 RX ORDER — ESCITALOPRAM OXALATE 10 MG/1
TABLET ORAL
Qty: 90 TABLET | Refills: 0 | Status: SHIPPED | OUTPATIENT
Start: 2021-08-20 | End: 2021-11-10

## 2021-10-05 ENCOUNTER — PATIENT MESSAGE (OUTPATIENT)
Dept: ADMINISTRATIVE | Facility: HOSPITAL | Age: 61
End: 2021-10-05

## 2021-11-10 DIAGNOSIS — F41.9 ANXIETY: ICD-10-CM

## 2021-11-10 RX ORDER — ESCITALOPRAM OXALATE 10 MG/1
TABLET ORAL
Qty: 90 TABLET | Refills: 0 | Status: SHIPPED | OUTPATIENT
Start: 2021-11-10 | End: 2022-02-02 | Stop reason: SDUPTHER

## 2021-12-30 ENCOUNTER — PATIENT MESSAGE (OUTPATIENT)
Dept: PRIMARY CARE CLINIC | Facility: CLINIC | Age: 61
End: 2021-12-30
Payer: COMMERCIAL

## 2021-12-30 DIAGNOSIS — J01.90 ACUTE SINUSITIS, RECURRENCE NOT SPECIFIED, UNSPECIFIED LOCATION: Primary | ICD-10-CM

## 2021-12-30 RX ORDER — AZITHROMYCIN 250 MG/1
TABLET, FILM COATED ORAL
Qty: 6 TABLET | Refills: 0 | Status: SHIPPED | OUTPATIENT
Start: 2021-12-30 | End: 2022-01-04

## 2021-12-30 NOTE — TELEPHONE ENCOUNTER
I typically do not Rx antibiotics without seeing a patient but will absolutely make an exception given the current pandemic and since COVID neg..  Will send her a Zpack    Dr. G

## 2022-01-26 ENCOUNTER — OFFICE VISIT (OUTPATIENT)
Dept: PAIN MEDICINE | Facility: CLINIC | Age: 62
End: 2022-01-26
Payer: COMMERCIAL

## 2022-01-26 VITALS
OXYGEN SATURATION: 100 % | BODY MASS INDEX: 18.84 KG/M2 | WEIGHT: 106.38 LBS | TEMPERATURE: 97 F | HEART RATE: 82 BPM | DIASTOLIC BLOOD PRESSURE: 54 MMHG | SYSTOLIC BLOOD PRESSURE: 113 MMHG | RESPIRATION RATE: 18 BRPM

## 2022-01-26 DIAGNOSIS — M50.30 DDD (DEGENERATIVE DISC DISEASE), CERVICAL: Primary | ICD-10-CM

## 2022-01-26 PROCEDURE — 99999 PR PBB SHADOW E&M-EST. PATIENT-LVL IV: CPT | Mod: PBBFAC,,, | Performed by: PHYSICIAN ASSISTANT

## 2022-01-26 PROCEDURE — 3078F DIAST BP <80 MM HG: CPT | Mod: CPTII,S$GLB,, | Performed by: PHYSICIAN ASSISTANT

## 2022-01-26 PROCEDURE — 3074F PR MOST RECENT SYSTOLIC BLOOD PRESSURE < 130 MM HG: ICD-10-PCS | Mod: CPTII,S$GLB,, | Performed by: PHYSICIAN ASSISTANT

## 2022-01-26 PROCEDURE — 99213 PR OFFICE/OUTPT VISIT, EST, LEVL III, 20-29 MIN: ICD-10-PCS | Mod: S$GLB,,, | Performed by: PHYSICIAN ASSISTANT

## 2022-01-26 PROCEDURE — 3008F BODY MASS INDEX DOCD: CPT | Mod: CPTII,S$GLB,, | Performed by: PHYSICIAN ASSISTANT

## 2022-01-26 PROCEDURE — 1160F RVW MEDS BY RX/DR IN RCRD: CPT | Mod: CPTII,S$GLB,, | Performed by: PHYSICIAN ASSISTANT

## 2022-01-26 PROCEDURE — 3074F SYST BP LT 130 MM HG: CPT | Mod: CPTII,S$GLB,, | Performed by: PHYSICIAN ASSISTANT

## 2022-01-26 PROCEDURE — 3078F PR MOST RECENT DIASTOLIC BLOOD PRESSURE < 80 MM HG: ICD-10-PCS | Mod: CPTII,S$GLB,, | Performed by: PHYSICIAN ASSISTANT

## 2022-01-26 PROCEDURE — 99213 OFFICE O/P EST LOW 20 MIN: CPT | Mod: S$GLB,,, | Performed by: PHYSICIAN ASSISTANT

## 2022-01-26 PROCEDURE — 99999 PR PBB SHADOW E&M-EST. PATIENT-LVL IV: ICD-10-PCS | Mod: PBBFAC,,, | Performed by: PHYSICIAN ASSISTANT

## 2022-01-26 PROCEDURE — 1159F MED LIST DOCD IN RCRD: CPT | Mod: CPTII,S$GLB,, | Performed by: PHYSICIAN ASSISTANT

## 2022-01-26 PROCEDURE — 3008F PR BODY MASS INDEX (BMI) DOCUMENTED: ICD-10-PCS | Mod: CPTII,S$GLB,, | Performed by: PHYSICIAN ASSISTANT

## 2022-01-26 PROCEDURE — 1159F PR MEDICATION LIST DOCUMENTED IN MEDICAL RECORD: ICD-10-PCS | Mod: CPTII,S$GLB,, | Performed by: PHYSICIAN ASSISTANT

## 2022-01-26 PROCEDURE — 1160F PR REVIEW ALL MEDS BY PRESCRIBER/CLIN PHARMACIST DOCUMENTED: ICD-10-PCS | Mod: CPTII,S$GLB,, | Performed by: PHYSICIAN ASSISTANT

## 2022-01-26 RX ORDER — GABAPENTIN 300 MG/1
600 CAPSULE ORAL NIGHTLY
Qty: 180 CAPSULE | Refills: 3 | Status: SHIPPED | OUTPATIENT
Start: 2022-01-26 | End: 2023-01-09 | Stop reason: SDUPTHER

## 2022-01-30 NOTE — PROGRESS NOTES
This note was completed with dictation software and grammatical errors may exist.    CC:Neck pain    HPI: The patient is a 61-year-old woman with no major past medical history who presents in referral from the Dr. Kathy Melton for neck pain, right shoulder pain.  She returns in follow-up today with neck pain.  It has been over 2 years since her last visit.  She describes soreness in the bilateral neck that is worse with activity and holding her grandchildren.  She has relief with gabapentin and feels that her pain is tolerable as long as she is taking this medication.  She denies having any radicular pain, denies weakness or numbness, denies bladder or bowel incontinence.    Pain intervention history:  She completed physical therapy at the [facility] [place] with moderate relief.  She is status post C7-T1 cervical interlaminar epidural steroid injection on 9/21/15 with 15% relief.  She is status post C7-T1 cervical interlaminar epidural steroid injection on 10/26/15 with 75% relief.   She is status post C7-T1 cervical interlaminar epidural steroid injection on 02/11/2019 with over 90% relief.    ROS: She reports difficulty sleeping.  Balance of review of systems is negative.    Medical, surgical, family and social history reviewed elsewhere in record.    Medications/Allergies: See med card    Vitals:    01/26/22 1504   BP: (!) 113/54   Pulse: 82   Resp: 18   Temp: 97 °F (36.1 °C)   TempSrc: Oral   SpO2: 100%   Weight: 48.2 kg (106 lb 6 oz)   PainSc:   2   PainLoc: Back         Physical exam:  Gen: A and O x3, pleasant, well-groomed  Skin: No rashes or obvious lesions  HEENT: PERRLA, no obvious deformities on ears or in canals.  CVS: Regular rate and rhythm, normal S1 and S2, no murmurs.  Resp: Clear to auscultation bilaterally, no wheezes or rales.  Abdomen: Soft, NT/ND, normal bowel sounds present.  Musculoskeletal: No antalgic gait.      Neuro:  Upper extremities: 5/5 strength bilaterally   Reflexes:  Brachioradialis 2+, Bicep 2+, Tricep 2+  Sensory: Intact and symmetrical to light touch and pinprick in C2-T1 dermatomes bilaterally    Cervical Spine:  Cervical spine: Range of motion is full with flexion and extension and lateral rotation without significant increased pain.  Myofascial exam:  No tenderness to palpation to the cervical paraspinous muscles.    Imagin/2/15 MRI C-spine  The cervical spinal cord is normal in signal and contour. No abnormal intrathecal enhancement.  C2/C3:No significant disc bulge, central canal or neural foraminal stenosis.  C3/C4: No significant disk bulge, central canal or neural foraminal stenosis.  C4/C5: Trace disk bulge with a pituitary hypertrophy facet joint arthropathy with out significant center canal stenosis with mild/moderate left and mild right neural foraminal stenosis.  C5/C6: Posterior disk osteophyte with uncovertebral joint or bridging facet arthropathy with mild center canal and moderate bilateral foraminal stenosis.  C6/C7: Posterior disk osteophyte with uncovertebral joint hypertrophy and facet joint arthropathy with mild/moderate central canal stenosis and moderate bilateral neural foraminal stenosis.   C7/T1: No significant disc bulge, central canal or neural foraminal stenosis.     Assessment:  The patient is a 61-year-old woman with no major past medical history who presents in referral from the Dr. Kathy Melton for neck pain, right shoulder pain.  1. DDD (degenerative disc disease), cervical           Plan:  1. She will continue to take gabapentin 600 mg daily.  I have refilled this for her and she will follow-up in 1 year or sooner as needed.  If her pain worsens we can consider repeating a cervical EVELIA.

## 2022-02-02 ENCOUNTER — PATIENT MESSAGE (OUTPATIENT)
Dept: PRIMARY CARE CLINIC | Facility: CLINIC | Age: 62
End: 2022-02-02
Payer: COMMERCIAL

## 2022-02-02 DIAGNOSIS — F41.9 ANXIETY: ICD-10-CM

## 2022-02-02 RX ORDER — ESCITALOPRAM OXALATE 10 MG/1
10 TABLET ORAL DAILY
Qty: 90 TABLET | Refills: 0 | Status: CANCELLED | OUTPATIENT
Start: 2022-02-02

## 2022-02-02 NOTE — TELEPHONE ENCOUNTER
No new care gaps identified.  Powered by [employer]. Reference number: [id number].   2/02/2022 9:04:55 AM CST

## 2022-02-16 ENCOUNTER — OFFICE VISIT (OUTPATIENT)
Dept: OBSTETRICS AND GYNECOLOGY | Facility: CLINIC | Age: 62
End: 2022-02-16
Payer: COMMERCIAL

## 2022-02-16 VITALS
HEIGHT: 62 IN | SYSTOLIC BLOOD PRESSURE: 102 MMHG | BODY MASS INDEX: 19.76 KG/M2 | WEIGHT: 107.38 LBS | DIASTOLIC BLOOD PRESSURE: 64 MMHG

## 2022-02-16 DIAGNOSIS — Z79.890 HORMONE REPLACEMENT THERAPY (HRT): Primary | ICD-10-CM

## 2022-02-16 DIAGNOSIS — Z79.890 POSTMENOPAUSAL HRT (HORMONE REPLACEMENT THERAPY): ICD-10-CM

## 2022-02-16 PROCEDURE — 3078F PR MOST RECENT DIASTOLIC BLOOD PRESSURE < 80 MM HG: ICD-10-PCS | Mod: CPTII,S$GLB,, | Performed by: OBSTETRICS & GYNECOLOGY

## 2022-02-16 PROCEDURE — 99213 PR OFFICE/OUTPT VISIT, EST, LEVL III, 20-29 MIN: ICD-10-PCS | Mod: S$GLB,,, | Performed by: OBSTETRICS & GYNECOLOGY

## 2022-02-16 PROCEDURE — 1159F MED LIST DOCD IN RCRD: CPT | Mod: CPTII,S$GLB,, | Performed by: OBSTETRICS & GYNECOLOGY

## 2022-02-16 PROCEDURE — 3074F PR MOST RECENT SYSTOLIC BLOOD PRESSURE < 130 MM HG: ICD-10-PCS | Mod: CPTII,S$GLB,, | Performed by: OBSTETRICS & GYNECOLOGY

## 2022-02-16 PROCEDURE — 3008F PR BODY MASS INDEX (BMI) DOCUMENTED: ICD-10-PCS | Mod: CPTII,S$GLB,, | Performed by: OBSTETRICS & GYNECOLOGY

## 2022-02-16 PROCEDURE — 99999 PR PBB SHADOW E&M-EST. PATIENT-LVL III: ICD-10-PCS | Mod: PBBFAC,,, | Performed by: OBSTETRICS & GYNECOLOGY

## 2022-02-16 PROCEDURE — 3078F DIAST BP <80 MM HG: CPT | Mod: CPTII,S$GLB,, | Performed by: OBSTETRICS & GYNECOLOGY

## 2022-02-16 PROCEDURE — 99999 PR PBB SHADOW E&M-EST. PATIENT-LVL III: CPT | Mod: PBBFAC,,, | Performed by: OBSTETRICS & GYNECOLOGY

## 2022-02-16 PROCEDURE — 3074F SYST BP LT 130 MM HG: CPT | Mod: CPTII,S$GLB,, | Performed by: OBSTETRICS & GYNECOLOGY

## 2022-02-16 PROCEDURE — 3008F BODY MASS INDEX DOCD: CPT | Mod: CPTII,S$GLB,, | Performed by: OBSTETRICS & GYNECOLOGY

## 2022-02-16 PROCEDURE — 1159F PR MEDICATION LIST DOCUMENTED IN MEDICAL RECORD: ICD-10-PCS | Mod: CPTII,S$GLB,, | Performed by: OBSTETRICS & GYNECOLOGY

## 2022-02-16 PROCEDURE — 99213 OFFICE O/P EST LOW 20 MIN: CPT | Mod: S$GLB,,, | Performed by: OBSTETRICS & GYNECOLOGY

## 2022-02-16 RX ORDER — ESTRADIOL 1 MG/1
1 TABLET ORAL DAILY
Qty: 30 TABLET | Refills: 11 | Status: SHIPPED | OUTPATIENT
Start: 2022-02-16 | End: 2022-03-11

## 2022-02-16 RX ORDER — MEDROXYPROGESTERONE ACETATE 5 MG/1
5 TABLET ORAL DAILY
Qty: 30 TABLET | Refills: 11 | Status: SHIPPED | OUTPATIENT
Start: 2022-02-16 | End: 2022-04-28

## 2022-02-16 NOTE — PROGRESS NOTES
Chief Complaint   Patient presents with    hormone consult       History and Physical:  Ashley Donnelly is a 61 y.o. F who presents today for to discuss menopausal symptoms.    Patient's last menstrual period was 07/16/2015.  Years since menopause onset? 56yr old  Started HRT as soon as she stopped COCP, denies having menopausal symptoms    ROS:   No Hot flashes  No Night Sweats  No Sleep disturbance  No Depression  No Vaginal dryness  No Dyspareunia    HRT Risk Factors:  Prior CAD, MI, stroke/TIA, PE/VTE? No   Thrombophilia or APS? No   Breast & Endometrial Cancer? no  Estrogen dependent neoplasm? No   Liver disease? No   Undiagnosed abnormal genital bleeding? No   HTN, DM, HLD, Gallbladder disease, Obesity, Migraines w/ aura, Tobacco Abuse? no  Family history of heart disease? Brother CAD, PGF MI, MGF MI, Mom HTN  Family history of breast cancer or BRCA? no  Family history of dementia? Mom dementia    Allergies:   Review of patient's allergies indicates:   Allergen Reactions    Compazine [prochlorperazine edisylate] Other (See Comments)     Facial drooping     Past Medical History:   Diagnosis Date    Abnormal Pap smear of cervix     Anxiety     Arthritis     Basal cell carcinoma 11/2014    anterior chest    Herpes simplex     fever blisters    History of abnormal cervical Pap smear     Hormone replacement therapy (HRT)     PONV (postoperative nausea and vomiting)     Seasonal allergies      Past Surgical History:   Procedure Laterality Date    AUGMENTATION OF BREAST      BREAST SURGERY      Breast Augmentation, X2    COLONOSCOPY N/A 4/26/2018    Procedure: COLONOSCOPY;  Surgeon: Mariano Kapoor MD;  Location: [address]);  Service: Endoscopy;  Laterality: N/A;    EPIDURAL STEROID INJECTION INTO CERVICAL SPINE N/A 2/11/2019    Procedure: Injection-steroid-epidural-cervical;  Surgeon: Liam Morgan MD;  Location: [facility];  Service: Pain Management;  Laterality: N/A;    NECK MASS EXCISION  Right     benign    SKIN CANCER EXCISION N/A 2014    anterior chest for basal cell     SKIN CANCER EXCISION  2017    BCC of scalp     MEDS:   Current Outpatient Medications on File Prior to Visit   Medication Sig Dispense Refill    biotin 5 mg Cap Take 1 capsule by mouth once daily.      EScitalopram oxalate (LEXAPRO) 10 MG tablet Take 1 tablet (10 mg total) by mouth once daily. 90 tablet 0    estradioL (ESTRACE) 2 MG tablet TAKE 1 TABLET BY MOUTH EVERY DAY 90 tablet 0    fluticasone (FLONASE) 50 mcg/actuation nasal spray 2 sprays by Each Nare route once daily.  6    gabapentin (NEURONTIN) 300 MG capsule Take 2 capsules (600 mg total) by mouth every evening. 180 capsule 3    medroxyPROGESTERone (PROVERA) 10 MG tablet TAKE 1 TABLET BY MOUTH EVERY DAY 90 tablet 4    multivitamin capsule Take 1 capsule by mouth once daily.      ondansetron (ZOFRAN) 8 MG tablet Take 1 tablet (8 mg total) by mouth every 6 (six) hours as needed for Nausea. 30 tablet 2    VITAMIN B COMPLEX NO.12-NIACIN ORAL Take 1 tablet by mouth once daily.      acyclovir (ZOVIRAX) 200 MG capsule Take 2 capsules (400 mg total) by mouth 3 (three) times daily. for 5 days 60 capsule 1    medroxyPROGESTERone (PROVERA) 5 MG tablet TAKE 1 TABLET BY MOUTH EVERY DAY 90 tablet 3     No current facility-administered medications on file prior to visit.     OB History        1    Para   1    Term   1            AB        Living           SAB        IAB        Ectopic        Multiple        Live Births                   Social History     Socioeconomic History    Marital status:    Occupational History    Occupation:    Tobacco Use    Smoking status: Never Smoker    Smokeless tobacco: Never Used   Substance and Sexual Activity    Alcohol use: Yes     Comment: 2 gibran per month    Drug use: No    Sexual activity: Yes     Partners: Male     Birth control/protection: None   Social History Narrative  "   , 2 Grandchildren, Exercises, elliptical body pump, looking after her dad     Family History   Problem Relation Age of Onset    Heart disease Mother         chf    Hypertension Mother     Kidney disease Mother     Hyperlipidemia Mother     Cataracts Mother     Heart disease Father         afib    Hypertension Father     Anemia Father     Cataracts Father     No Known Problems Sister     Heart disease Brother         CAD    No Known Problems Daughter     No Known Problems Brother     No Known Problems Brother     Colon cancer Maternal Grandmother     Cancer Maternal Grandmother     Breast cancer Neg Hx     Ovarian cancer Neg Hx     Amblyopia Neg Hx     Blindness Neg Hx     Diabetes Neg Hx     Glaucoma Neg Hx     Macular degeneration Neg Hx     Retinal detachment Neg Hx     Strabismus Neg Hx     Stroke Neg Hx     Thyroid disease Neg Hx        Physical Exam:   /64   Ht 5' 2" (1.575 m)   Wt 48.7 kg (107 lb 5.8 oz)   LMP 07/16/2015   BMI 19.64 kg/m²   Constitutional: She appears alert and responsive. She appears well-developed, well-groomed, and well-nourished. No distress.  Abdominal: Soft. She exhibits no distension, hernias or masses. There is no tenderness. No enlargement of liver edge or spleen.  There is no rebound and no guarding.   Genitourinary: Deferred. Pap not indicated and no GYN complaints. Offered patient exam, patient declined exam.      Assessment/Plan:   Hormone replacement therapy (HRT)  -     estradioL (ESTRACE) 1 MG tablet; Take 1 tablet (1 mg total) by mouth once daily.  Dispense: 30 tablet; Refill: 11  -     medroxyPROGESTERone (PROVERA) 5 MG tablet; Take 1 tablet (5 mg total) by mouth once daily.  Dispense: 30 tablet; Refill: 11    Postmenopausal HRT (hormone replacement therapy)    HRT:   Lifestyles solutions such as layering clothing, maintaining lower ambient temperature, and consuming cool drinks are reasonable measure for management.   Risk, " benefits and alternatives to hormone replacement discussed. Start lowest dose for the shortest duration to relieve menopausal symptoms.  Combined HRT is associated with small risks in coronary heart disease, pulmonary embolism, breast cancer, and dementia; but decreased risks in colon cancer and hip fracture.   Safer alternative may be available to treat the symptoms of menopause:   Paxil 7.5mg daily, adverse effects nausea, dizziness, constipation, and sexual dysfunction  Gabapentin 600-900mg daily, adverse effects dizziness, peripheral edema, & somnolence  Clonidine .1mg qHS, adverse effects dry mouth, insomnia, & drowsiness  Patient decided to proceed with therapy.     Will decrease HRT:   Estrace 2g to 1g  Provera 10mg to 5mg

## 2022-04-03 NOTE — PROGRESS NOTES
[facility] Note    Chief Complaint      Chief Complaint   Patient presents with    Annual Exam       History of Present Illness      Ashley Donnelly is a 61 y.o. WF with HSV, Basal call carcinoma, Postmenopausal HRT, Osteopenia,  DDD cervical spine,  Bulging cervial disk, Cervical Radiculopathy presents to fu for well visit. Last visit - 8/19/20    H/o COVID - 19 - 12/18/20. Also, in Dec. 2021 but tested neg  But had Sx's.     HSV - Pt on Acyclovir prn.  Trigger - sun.      Basal cell Ca - has fu 8/31/20     Cervical Radiculopathy - Pt on gabapentin 600 mg QHS.  Fu by Dr. Morgan, Pain Mgmnt.      Anxiety - Pt doing well on Lexapro 10 mg/d.     Allergic rhinitis - Pt takes Claritin D prn and Flonase prn. Rec resume flonase.     Left upper lid ptosis - Does not bother line of vision.  Could consider fu with Dr. Mondragon or Dr. Montenegro - plastics.  Rec see Gen Ophtho for eye exam since not seen in > 2 yrs.     H/o Malignant Melanoma - fu by Derm Q 3 mos.  S/p resection to lesion on Rt arm.      Postmenopausal HRT - Pt on Provera and Estrace. Managed by OB/GYN.     HCM - Flu - 9/15/21;  Tdap - 5/4/15;  PCV 13 - none;  PVX 23 - none;  Shingrix - 7/20/20 and 3 2 - 9/28/20;  COVID -19 Vaccine (Moderna) - # 1 - 2/25/21; # 3/25/21; # 3 - 11/17/21; # 4 ; MGM -8/18/21 - repeat 1 yr;  DEXA - 4/3/18 - Osteopenia - will order;  PAP - 2/6/19 - neg; Hep C Screen - 3/19/18 - neg;  HIV Screen - ?; C-scope - 4/26/18 -Diverticulosis - repeat 10 yrs;  Prev PCP - Dr. Espinoza; Pain Mgmnt - Dr. Morgan; Ob/GYN - Dr. Nance/Dr. De Oliveira; Derm - Dr. Morley; Ophtho - ?    Patient Care Team:  Ashley Hairston MD as PCP - General (Internal Medicine)  Luann Meehan MA as Care Coordinator     Health Maintenance:  Immunization History   Administered Date(s) Administered    COVID-19 MRNA, LN-S PF (MODERNA HALF 0.25 ML DOSE) 04/05/2022    COVID-19, MRNA, LN-S, PF (MODERNA FULL 0.5 ML DOSE) 02/25/2021, 03/25/2021, 11/17/2021     Influenza - Quadrivalent - PF *Preferred* (6 months and older) 09/11/2018, 10/17/2019    Tdap 05/04/2015    Zoster Recombinant 07/20/2020, 09/28/2020      Health Maintenance   Topic Date Due    DEXA Scan  04/03/2020    Mammogram  08/18/2022    TETANUS VACCINE  05/04/2025    Lipid Panel  09/14/2025    Hepatitis C Screening  Completed        Past Medical History:  Past Medical History:   Diagnosis Date    Abnormal Pap smear of cervix     Anxiety     Arthritis     Basal cell carcinoma 11/2014    anterior chest    COVID-19     12/19/20 and possibly Dec. 2021    Herpes simplex     fever blisters    History of abnormal cervical Pap smear     Hormone replacement therapy (HRT)     Malignant melanoma of skin, unspecified     right arm    PONV (postoperative nausea and vomiting)     Seasonal allergies        Past Surgical History:   has a past surgical history that includes Neck mass excision (Right, 1998); Skin cancer excision (N/A, 11/2014); Breast surgery; Skin cancer excision (2017); Colonoscopy (N/A, 04/26/2018); Epidural steroid injection into cervical spine (N/A, 02/11/2019); Augmentation of breast; and Excision of melanoma.    Family History:  family history includes Anemia in her father; Cataracts in her father and mother; Colon cancer in her maternal grandmother; Heart disease in her brother, father, and mother; Hyperlipidemia in her mother; Hypertension in her father and mother; Kidney disease in her mother; No Known Problems in her brother, brother, daughter, and sister.     Social History:  Social History     Tobacco Use    Smoking status: Never Smoker    Smokeless tobacco: Never Used   Substance Use Topics    Alcohol use: Yes     Comment: 2 gibran per month    Drug use: No       Review of Systems   Constitutional: Negative for activity change and unexpected weight change.   HENT: Negative for hearing loss, rhinorrhea and trouble swallowing.    Eyes: Negative for discharge and visual  disturbance.   Respiratory: Negative for chest tightness and wheezing.    Cardiovascular: Negative for chest pain and palpitations.   Gastrointestinal: Negative for blood in stool, constipation, diarrhea, nausea and vomiting.   Endocrine: Negative for polydipsia and polyuria.   Genitourinary: Positive for vaginal bleeding. Negative for difficulty urinating, dysuria, hematuria and menstrual problem.        Breakthrough bleeding since Estrace recently dec.  Fu by Ob and plans to d/w her.   Musculoskeletal: Positive for neck pain. Negative for arthralgias and joint swelling.   Neurological: Negative for weakness and headaches.   Psychiatric/Behavioral: Negative for confusion and dysphoric mood. The patient is not nervous/anxious.         Stable. Does well on Lexapro.         Medications:    Current Outpatient Medications:     biotin 5 mg Cap, Take 1 capsule by mouth once daily., Disp: , Rfl:     estradioL (ESTRACE) 1 MG tablet, Take 1 tablet (1 mg total) by mouth once daily., Disp: 90 tablet, Rfl: 3    fluticasone (FLONASE) 50 mcg/actuation nasal spray, 2 sprays by Each Nare route once daily., Disp: , Rfl: 6    gabapentin (NEURONTIN) 300 MG capsule, Take 2 capsules (600 mg total) by mouth every evening., Disp: 180 capsule, Rfl: 3    medroxyPROGESTERone (PROVERA) 5 MG tablet, Take 1 tablet (5 mg total) by mouth once daily., Disp: 30 tablet, Rfl: 11    multivitamin capsule, Take 1 capsule by mouth once daily., Disp: , Rfl:     VITAMIN B COMPLEX NO.12-NIACIN ORAL, Take 1 tablet by mouth once daily., Disp: , Rfl:     acyclovir (ZOVIRAX) 200 MG capsule, Take 2 capsules (400 mg total) by mouth 3 (three) times daily. for 5 days, Disp: 60 capsule, Rfl: 1    EScitalopram oxalate (LEXAPRO) 10 MG tablet, Take 1 tablet (10 mg total) by mouth once daily., Disp: 90 tablet, Rfl: 3    ondansetron (ZOFRAN) 4 MG tablet, Take 2 tablets (8 mg total) by mouth every 8 (eight) hours as needed for Nausea., Disp: 30 tablet, Rfl: 1  "    Allergies:  Review of patient's allergies indicates:   Allergen Reactions    Compazine [prochlorperazine edisylate] Other (See Comments)     Facial drooping       Physical Exam      Vital Signs  Temp: 98 °F (36.7 °C)  Pulse: 73  Resp: 18  SpO2: 98 %  BP: 105/62  BP Location: Right arm  Patient Position: Sitting  Pain Score: 0-No pain  Height and Weight  Height: 5' 2" (157.5 cm)  Weight: 48.4 kg (106 lb 11.2 oz)  BSA (Calculated - sq m): 1.45 sq meters  BMI (Calculated): 19.5  Weight in (lb) to have BMI = 25: 136.4      Patient Position: Sitting      Physical Exam  Vitals reviewed.   Constitutional:       General: She is not in acute distress.     Appearance: Normal appearance. She is not ill-appearing, toxic-appearing or diaphoretic.   HENT:      Head: Normocephalic and atraumatic.      Right Ear: Tympanic membrane normal.      Left Ear: Tympanic membrane normal.   Eyes:      Extraocular Movements: Extraocular movements intact.      Conjunctiva/sclera: Conjunctivae normal.      Pupils: Pupils are equal, round, and reactive to light.      Comments: Left upper lid - slight ptosis. Facies symmetric otherwise   Neck:      Vascular: No carotid bruit.   Cardiovascular:      Rate and Rhythm: Normal rate and regular rhythm.      Pulses: Normal pulses.      Heart sounds: Normal heart sounds. No murmur heard.  Pulmonary:      Effort: No respiratory distress.      Breath sounds: Normal breath sounds. No wheezing.   Abdominal:      General: Bowel sounds are normal. There is no distension.      Palpations: Abdomen is soft.      Tenderness: There is no abdominal tenderness. There is no guarding or rebound.   Musculoskeletal:         General: Normal range of motion.   Skin:     General: Skin is warm and dry.   Neurological:      General: No focal deficit present.      Mental Status: She is alert and oriented to person, place, and time.   Psychiatric:         Mood and Affect: Mood normal.         Behavior: Behavior normal. "          Laboratory:  CBC:  Recent Labs   Lab 04/29/19  0811 09/14/20  0816   WBC 3.74 L 4.46   RBC 4.04 3.99 L   Hemoglobin 12.8 13.0   Hematocrit 40.1 40.6   Platelets 224 215   MCV 99 H 102 H   MCH 31.7 H 32.6 H   MCHC 31.9 L 32.0       CMP:  Recent Labs   Lab 04/29/19  0811 09/14/20  0816   Glucose 84 87   Calcium 8.8 8.7   Albumin 3.4 L 3.8   Total Protein 6.5 6.9   Sodium 139 136   Potassium 4.1 4.1   CO2 27 26   Chloride 104 102   BUN 18 17   Creatinine 0.7 0.8   Alkaline Phosphatase 51 L 43 L   ALT 14 11   AST 16 16   Total Bilirubin 0.4 0.5       LIPIDS:  Recent Labs   Lab 04/29/19  0811 09/14/20  0816   TSH 1.096 1.871   HDL 68 70   Cholesterol 162 172   Triglycerides 60 94   LDL Cholesterol 82.0 83.2   HDL/Cholesterol Ratio 42.0 40.7   Non-HDL Cholesterol 94 102   Total Cholesterol/HDL Ratio 2.4 2.5       TSH:  Recent Labs   Lab 04/29/19  0811 09/14/20  0816   TSH 1.096 1.871         Assessment/Plan     Ashley Donnelly is a 61 y.o.female with:    Normal physical exam, routine  -     CBC Auto Differential; Future; Expected date: 04/05/2022  -     Comprehensive Metabolic Panel; Future; Expected date: 04/05/2022  -     T4, Free; Future; Expected date: 04/05/2022  -     TSH; Future; Expected date: 04/05/2022  - Performed today.  Will check Basic labs.  RTC in 1 yr for fu or sooner if needed    Herpes  -     acyclovir (ZOVIRAX) 200 MG capsule; Take 2 capsules (400 mg total) by mouth 3 (three) times daily. for 5 days  Dispense: 60 capsule; Refill: 1  - Stable.  Cont current regimen. Refill Zovirax prn.     Anxiety  -     EScitalopram oxalate (LEXAPRO) 10 MG tablet; Take 1 tablet (10 mg total) by mouth once daily.  Dispense: 90 tablet; Refill: 3  - Controlled.  Cont current regimen.    Cervical radiculopathy  - Stable.  Cont current regimen.    Ptosis of left eyelid  - D/w pt. Could consider fu with oculoplastics.         Postmenopausal HRT (hormone replacement therapy)  -     DXA Bone Density Spine And Hip;  Future; Expected date: 04/05/2022    Screening for lipoid disorders  -     Lipid Panel; Future; Expected date: 04/05/2022      Chronic conditions status updated as per HPI.  Other than changes above, cont current medications and maintain follow up with specialists.  Follow up in about 1 year (around 4/5/2023) for well visit or sooner if needed.      Ashley Hairston MD  [facility]

## 2022-04-05 ENCOUNTER — OFFICE VISIT (OUTPATIENT)
Dept: PRIMARY CARE CLINIC | Facility: CLINIC | Age: 62
End: 2022-04-05
Payer: COMMERCIAL

## 2022-04-05 ENCOUNTER — IMMUNIZATION (OUTPATIENT)
Dept: PRIMARY CARE CLINIC | Facility: CLINIC | Age: 62
End: 2022-04-05
Payer: COMMERCIAL

## 2022-04-05 VITALS
RESPIRATION RATE: 18 BRPM | TEMPERATURE: 98 F | HEART RATE: 73 BPM | DIASTOLIC BLOOD PRESSURE: 62 MMHG | BODY MASS INDEX: 19.63 KG/M2 | WEIGHT: 106.69 LBS | SYSTOLIC BLOOD PRESSURE: 105 MMHG | OXYGEN SATURATION: 98 % | HEIGHT: 62 IN

## 2022-04-05 DIAGNOSIS — F41.9 ANXIETY: ICD-10-CM

## 2022-04-05 DIAGNOSIS — Z23 NEED FOR VACCINATION: Primary | ICD-10-CM

## 2022-04-05 DIAGNOSIS — H02.402 PTOSIS OF LEFT EYELID: ICD-10-CM

## 2022-04-05 DIAGNOSIS — M54.12 CERVICAL RADICULOPATHY: ICD-10-CM

## 2022-04-05 DIAGNOSIS — Z00.00 NORMAL PHYSICAL EXAM, ROUTINE: Primary | ICD-10-CM

## 2022-04-05 DIAGNOSIS — B00.9 HERPES: ICD-10-CM

## 2022-04-05 DIAGNOSIS — Z13.220 SCREENING FOR LIPOID DISORDERS: ICD-10-CM

## 2022-04-05 DIAGNOSIS — Z79.890 POSTMENOPAUSAL HRT (HORMONE REPLACEMENT THERAPY): ICD-10-CM

## 2022-04-05 PROCEDURE — 1160F PR REVIEW ALL MEDS BY PRESCRIBER/CLIN PHARMACIST DOCUMENTED: ICD-10-PCS | Mod: CPTII,S$GLB,, | Performed by: INTERNAL MEDICINE

## 2022-04-05 PROCEDURE — 91306 COVID-19, MRNA, LNP-S, PF, 100 MCG/0.25 ML DOSE VACCINE (MODERNA BOOSTER): CPT | Mod: PBBFAC | Performed by: INTERNAL MEDICINE

## 2022-04-05 PROCEDURE — 3074F SYST BP LT 130 MM HG: CPT | Mod: CPTII,S$GLB,, | Performed by: INTERNAL MEDICINE

## 2022-04-05 PROCEDURE — 99396 PREV VISIT EST AGE 40-64: CPT | Mod: S$GLB,,, | Performed by: INTERNAL MEDICINE

## 2022-04-05 PROCEDURE — 3078F PR MOST RECENT DIASTOLIC BLOOD PRESSURE < 80 MM HG: ICD-10-PCS | Mod: CPTII,S$GLB,, | Performed by: INTERNAL MEDICINE

## 2022-04-05 PROCEDURE — 99999 PR PBB SHADOW E&M-EST. PATIENT-LVL V: ICD-10-PCS | Mod: PBBFAC,,, | Performed by: INTERNAL MEDICINE

## 2022-04-05 PROCEDURE — 1160F RVW MEDS BY RX/DR IN RCRD: CPT | Mod: CPTII,S$GLB,, | Performed by: INTERNAL MEDICINE

## 2022-04-05 PROCEDURE — 1159F PR MEDICATION LIST DOCUMENTED IN MEDICAL RECORD: ICD-10-PCS | Mod: CPTII,S$GLB,, | Performed by: INTERNAL MEDICINE

## 2022-04-05 PROCEDURE — 0064A COVID-19, MRNA, LNP-S, PF, 100 MCG/0.25 ML DOSE VACCINE (MODERNA BOOSTER): CPT | Mod: CV19,PBBFAC | Performed by: INTERNAL MEDICINE

## 2022-04-05 PROCEDURE — 3078F DIAST BP <80 MM HG: CPT | Mod: CPTII,S$GLB,, | Performed by: INTERNAL MEDICINE

## 2022-04-05 PROCEDURE — 3074F PR MOST RECENT SYSTOLIC BLOOD PRESSURE < 130 MM HG: ICD-10-PCS | Mod: CPTII,S$GLB,, | Performed by: INTERNAL MEDICINE

## 2022-04-05 PROCEDURE — 3008F BODY MASS INDEX DOCD: CPT | Mod: CPTII,S$GLB,, | Performed by: INTERNAL MEDICINE

## 2022-04-05 PROCEDURE — 99396 PR PREVENTIVE VISIT,EST,40-64: ICD-10-PCS | Mod: S$GLB,,, | Performed by: INTERNAL MEDICINE

## 2022-04-05 PROCEDURE — 99999 PR PBB SHADOW E&M-EST. PATIENT-LVL V: CPT | Mod: PBBFAC,,, | Performed by: INTERNAL MEDICINE

## 2022-04-05 PROCEDURE — 1159F MED LIST DOCD IN RCRD: CPT | Mod: CPTII,S$GLB,, | Performed by: INTERNAL MEDICINE

## 2022-04-05 PROCEDURE — 3008F PR BODY MASS INDEX (BMI) DOCUMENTED: ICD-10-PCS | Mod: CPTII,S$GLB,, | Performed by: INTERNAL MEDICINE

## 2022-04-05 RX ORDER — ESCITALOPRAM OXALATE 10 MG/1
10 TABLET ORAL DAILY
Qty: 90 TABLET | Refills: 3 | Status: SHIPPED | OUTPATIENT
Start: 2022-04-05 | End: 2023-04-10

## 2022-04-05 RX ORDER — ONDANSETRON 4 MG/1
8 TABLET, FILM COATED ORAL EVERY 8 HOURS PRN
Qty: 30 TABLET | Refills: 1 | Status: SHIPPED | OUTPATIENT
Start: 2022-04-05 | End: 2022-04-05

## 2022-04-05 RX ORDER — ONDANSETRON 4 MG/1
4 TABLET, FILM COATED ORAL EVERY 8 HOURS PRN
Qty: 30 TABLET | Refills: 1 | Status: SHIPPED | OUTPATIENT
Start: 2022-04-05 | End: 2023-04-06 | Stop reason: SDUPTHER

## 2022-04-05 RX ORDER — ACYCLOVIR 200 MG/1
400 CAPSULE ORAL 3 TIMES DAILY
Qty: 60 CAPSULE | Refills: 1 | Status: SHIPPED | OUTPATIENT
Start: 2022-04-05 | End: 2023-02-23 | Stop reason: SDUPTHER

## 2022-04-26 ENCOUNTER — LAB VISIT (OUTPATIENT)
Dept: LAB | Facility: HOSPITAL | Age: 62
End: 2022-04-26
Attending: INTERNAL MEDICINE
Payer: COMMERCIAL

## 2022-04-26 DIAGNOSIS — Z13.220 SCREENING FOR LIPOID DISORDERS: ICD-10-CM

## 2022-04-26 DIAGNOSIS — Z00.00 NORMAL PHYSICAL EXAM, ROUTINE: ICD-10-CM

## 2022-04-26 LAB
ALBUMIN SERPL BCP-MCNC: 3.7 G/DL (ref 3.5–5.2)
ALP SERPL-CCNC: 49 U/L (ref 55–135)
ALT SERPL W/O P-5'-P-CCNC: 17 U/L (ref 10–44)
ANION GAP SERPL CALC-SCNC: 7 MMOL/L (ref 8–16)
AST SERPL-CCNC: 18 U/L (ref 10–40)
BASOPHILS # BLD AUTO: 0.03 K/UL (ref 0–0.2)
BASOPHILS NFR BLD: 0.4 % (ref 0–1.9)
BILIRUB SERPL-MCNC: 0.4 MG/DL (ref 0.1–1)
BUN SERPL-MCNC: 24 MG/DL (ref 8–23)
CALCIUM SERPL-MCNC: 9.1 MG/DL (ref 8.7–10.5)
CHLORIDE SERPL-SCNC: 103 MMOL/L (ref 95–110)
CHOLEST SERPL-MCNC: 171 MG/DL (ref 120–199)
CHOLEST/HDLC SERPL: 2.7 {RATIO} (ref 2–5)
CO2 SERPL-SCNC: 27 MMOL/L (ref 23–29)
CREAT SERPL-MCNC: 0.7 MG/DL (ref 0.5–1.4)
DIFFERENTIAL METHOD: ABNORMAL
EOSINOPHIL # BLD AUTO: 0.1 K/UL (ref 0–0.5)
EOSINOPHIL NFR BLD: 0.9 % (ref 0–8)
ERYTHROCYTE [DISTWIDTH] IN BLOOD BY AUTOMATED COUNT: 12.6 % (ref 11.5–14.5)
EST. GFR  (AFRICAN AMERICAN): >60 ML/MIN/1.73 M^2
EST. GFR  (NON AFRICAN AMERICAN): >60 ML/MIN/1.73 M^2
GLUCOSE SERPL-MCNC: 85 MG/DL (ref 70–110)
HCT VFR BLD AUTO: 41.1 % (ref 37–48.5)
HDLC SERPL-MCNC: 63 MG/DL (ref 40–75)
HDLC SERPL: 36.8 % (ref 20–50)
HGB BLD-MCNC: 13.4 G/DL (ref 12–16)
IMM GRANULOCYTES # BLD AUTO: 0.02 K/UL (ref 0–0.04)
IMM GRANULOCYTES NFR BLD AUTO: 0.2 % (ref 0–0.5)
LDLC SERPL CALC-MCNC: 93.8 MG/DL (ref 63–159)
LYMPHOCYTES # BLD AUTO: 2 K/UL (ref 1–4.8)
LYMPHOCYTES NFR BLD: 22.9 % (ref 18–48)
MCH RBC QN AUTO: 31.6 PG (ref 27–31)
MCHC RBC AUTO-ENTMCNC: 32.6 G/DL (ref 32–36)
MCV RBC AUTO: 97 FL (ref 82–98)
MONOCYTES # BLD AUTO: 0.5 K/UL (ref 0.3–1)
MONOCYTES NFR BLD: 6 % (ref 4–15)
NEUTROPHILS # BLD AUTO: 5.9 K/UL (ref 1.8–7.7)
NEUTROPHILS NFR BLD: 69.6 % (ref 38–73)
NONHDLC SERPL-MCNC: 108 MG/DL
NRBC BLD-RTO: 0 /100 WBC
PLATELET # BLD AUTO: 233 K/UL (ref 150–450)
PMV BLD AUTO: 11.2 FL (ref 9.2–12.9)
POTASSIUM SERPL-SCNC: 4.8 MMOL/L (ref 3.5–5.1)
PROT SERPL-MCNC: 6.6 G/DL (ref 6–8.4)
RBC # BLD AUTO: 4.24 M/UL (ref 4–5.4)
SODIUM SERPL-SCNC: 137 MMOL/L (ref 136–145)
T4 FREE SERPL-MCNC: 0.87 NG/DL (ref 0.71–1.51)
TRIGL SERPL-MCNC: 71 MG/DL (ref 30–150)
TSH SERPL DL<=0.005 MIU/L-ACNC: 1.18 UIU/ML (ref 0.4–4)
WBC # BLD AUTO: 8.53 K/UL (ref 3.9–12.7)

## 2022-04-26 PROCEDURE — 84443 ASSAY THYROID STIM HORMONE: CPT | Performed by: INTERNAL MEDICINE

## 2022-04-26 PROCEDURE — 80053 COMPREHEN METABOLIC PANEL: CPT | Performed by: INTERNAL MEDICINE

## 2022-04-26 PROCEDURE — 84439 ASSAY OF FREE THYROXINE: CPT | Performed by: INTERNAL MEDICINE

## 2022-04-26 PROCEDURE — 80061 LIPID PANEL: CPT | Performed by: INTERNAL MEDICINE

## 2022-04-26 PROCEDURE — 85025 COMPLETE CBC W/AUTO DIFF WBC: CPT | Performed by: INTERNAL MEDICINE

## 2022-04-26 PROCEDURE — 36415 COLL VENOUS BLD VENIPUNCTURE: CPT | Mod: PN | Performed by: INTERNAL MEDICINE

## 2022-04-27 ENCOUNTER — HOSPITAL ENCOUNTER (OUTPATIENT)
Dept: RADIOLOGY | Facility: HOSPITAL | Age: 62
Discharge: HOME | End: 2022-04-27
Attending: INTERNAL MEDICINE
Payer: COMMERCIAL

## 2022-04-27 ENCOUNTER — PATIENT MESSAGE (OUTPATIENT)
Dept: OBSTETRICS AND GYNECOLOGY | Facility: CLINIC | Age: 62
End: 2022-04-27
Payer: COMMERCIAL

## 2022-04-27 DIAGNOSIS — Z79.890 POSTMENOPAUSAL HRT (HORMONE REPLACEMENT THERAPY): ICD-10-CM

## 2022-04-27 DIAGNOSIS — Z79.890 HORMONE REPLACEMENT THERAPY (HRT): Primary | ICD-10-CM

## 2022-04-27 DIAGNOSIS — R39.9 UTI SYMPTOMS: ICD-10-CM

## 2022-04-27 PROCEDURE — 77080 DEXA BONE DENSITY SPINE HIP: ICD-10-PCS | Mod: 26,,, | Performed by: RADIOLOGY

## 2022-04-27 PROCEDURE — 77080 DXA BONE DENSITY AXIAL: CPT | Mod: 26,,, | Performed by: RADIOLOGY

## 2022-04-27 PROCEDURE — 77080 DXA BONE DENSITY AXIAL: CPT | Mod: TC,PO

## 2022-04-28 RX ORDER — NORETHINDRONE 5 MG/1
5 TABLET ORAL DAILY
Qty: 30 TABLET | Refills: 11 | Status: SHIPPED | OUTPATIENT
Start: 2022-04-28 | End: 2023-02-23 | Stop reason: SDUPTHER

## 2022-04-28 RX ORDER — SULFAMETHOXAZOLE AND TRIMETHOPRIM 800; 160 MG/1; MG/1
1 TABLET ORAL 2 TIMES DAILY
Qty: 6 TABLET | Refills: 0 | Status: SHIPPED | OUTPATIENT
Start: 2022-04-28 | End: 2022-05-01

## 2022-04-28 NOTE — PROGRESS NOTES
I sent pt a my chart message -  I reviewed your labs.  Your thyroid functions are normal.  Your Cholesterol looked good.  Your kidney function and liver functions looked good.  It does appear you could increase your hydration.  You are not anemic. All other abnormalities are not clinically significant.  No further recommendations at this time.    Dr. MOFFETT

## 2022-04-28 NOTE — PROGRESS NOTES
I sent pt a my chart message -  I reviewed your  DEXA/Bone Density which showed osteopenia. I recommend you start OTC Calcium max 1000 mg/d and Vit D3 0514-1867 units/d OTC.  In addition, I rec weight-bearing  Rec exercise at least 30 minutes 3 times/wk and ideally 5 times/wk.  No specific intensity.  Walking is fine. I just rec you pick a form of weight-bearing exercise you enjoy to facilitate long term compliance and benefit. We can repeat your DEXA in  2-3 yrs.    Dr. MOFFETT

## 2022-04-29 ENCOUNTER — TELEPHONE (OUTPATIENT)
Dept: PRIMARY CARE CLINIC | Facility: CLINIC | Age: 62
End: 2022-04-29
Payer: COMMERCIAL

## 2022-04-29 NOTE — TELEPHONE ENCOUNTER
----- Message from Ashley Hairston MD sent at 4/28/2022  5:58 PM CDT -----  I sent pt a my chart message -  I reviewed your labs.  Your thyroid functions are normal.  Your Cholesterol looked good.  Your kidney function and liver functions looked good.  It does appear you could increase your hydration.  You are not anemic. All other abnormalities are not clinically significant.  No further recommendations at this time.    Dr. MOFFETT

## 2022-05-02 ENCOUNTER — PATIENT MESSAGE (OUTPATIENT)
Dept: PRIMARY CARE CLINIC | Facility: CLINIC | Age: 62
End: 2022-05-02
Payer: COMMERCIAL

## 2022-05-02 ENCOUNTER — TELEPHONE (OUTPATIENT)
Dept: PRIMARY CARE CLINIC | Facility: CLINIC | Age: 62
End: 2022-05-02
Payer: COMMERCIAL

## 2022-05-02 NOTE — TELEPHONE ENCOUNTER
almam informing pt that  sent a [employer] msg with her dexa results. I see pt logged in after the message was sent but it does not say that she viewed the result msg

## 2022-05-02 NOTE — TELEPHONE ENCOUNTER
----- Message from Ashley Hairston MD sent at 4/28/2022  5:57 PM CDT -----  I sent pt a my chart message -  I reviewed your  DEXA/Bone Density which showed osteopenia. I recommend you start OTC Calcium max 1000 mg/d and Vit D3 2564-7467 units/d OTC.  In addition, I rec weight-bearing  Rec exercise at least 30 minutes 3 times/wk and ideally 5 times/wk.  No specific intensity.  Walking is fine. I just rec you pick a form of weight-bearing exercise you enjoy to facilitate long term compliance and benefit. We can repeat your DEXA in  2-3 yrs.    Dr. MOFFETT

## 2022-08-31 DIAGNOSIS — Z12.31 OTHER SCREENING MAMMOGRAM: ICD-10-CM

## 2022-09-06 ENCOUNTER — PATIENT MESSAGE (OUTPATIENT)
Dept: ADMINISTRATIVE | Facility: HOSPITAL | Age: 62
End: 2022-09-06
Payer: COMMERCIAL

## 2022-10-13 ENCOUNTER — PATIENT MESSAGE (OUTPATIENT)
Dept: PRIMARY CARE CLINIC | Facility: CLINIC | Age: 62
End: 2022-10-13
Payer: COMMERCIAL

## 2022-10-13 NOTE — TELEPHONE ENCOUNTER
From the pic it looks like a stye but those are usually tender and not necessarily itchy.  Other thought is possible blepharitis. Rec warm compresses and can use Baby shampoo to wash eyelids, lid massage, artificial tears.     Dr. MOFFETT

## 2022-10-14 ENCOUNTER — PATIENT MESSAGE (OUTPATIENT)
Dept: PRIMARY CARE CLINIC | Facility: CLINIC | Age: 62
End: 2022-10-14
Payer: COMMERCIAL

## 2022-10-18 ENCOUNTER — HOSPITAL ENCOUNTER (OUTPATIENT)
Dept: RADIOLOGY | Facility: HOSPITAL | Age: 62
Discharge: HOME | End: 2022-10-18
Attending: INTERNAL MEDICINE
Payer: COMMERCIAL

## 2022-10-18 DIAGNOSIS — Z12.31 OTHER SCREENING MAMMOGRAM: ICD-10-CM

## 2022-10-18 PROCEDURE — 77063 BREAST TOMOSYNTHESIS BI: CPT | Mod: 26,,, | Performed by: RADIOLOGY

## 2022-10-18 PROCEDURE — 77067 MAMMO DIGITAL SCREENING BILAT WITH TOMO: ICD-10-PCS | Mod: 26,,, | Performed by: RADIOLOGY

## 2022-10-18 PROCEDURE — 77063 MAMMO DIGITAL SCREENING BILAT WITH TOMO: ICD-10-PCS | Mod: 26,,, | Performed by: RADIOLOGY

## 2022-10-18 PROCEDURE — 77067 SCR MAMMO BI INCL CAD: CPT | Mod: 26,,, | Performed by: RADIOLOGY

## 2022-10-18 PROCEDURE — 77063 BREAST TOMOSYNTHESIS BI: CPT | Mod: TC,PO

## 2022-10-18 NOTE — PROGRESS NOTES
I sent pt a my chart message -  I reviewed your Mammogram.  The breasts are heterogeneously dense, which may obscure small masses. There is no evidence of suspicious masses, microcalcifications or architectural distortion.  Bilateral retropectoral silicone implants in place.   Impression:   No mammographic evidence of malignancy.  Routine screening mammogram in 1 year is recommended.  Dr. MOFFETT

## 2023-01-07 ENCOUNTER — PATIENT MESSAGE (OUTPATIENT)
Dept: PAIN MEDICINE | Facility: CLINIC | Age: 63
End: 2023-01-07
Payer: COMMERCIAL

## 2023-01-09 RX ORDER — GABAPENTIN 300 MG/1
600 CAPSULE ORAL NIGHTLY
Qty: 180 CAPSULE | Refills: 3 | Status: SHIPPED | OUTPATIENT
Start: 2023-01-09 | End: 2023-03-06 | Stop reason: SDUPTHER

## 2023-01-19 ENCOUNTER — TELEPHONE (OUTPATIENT)
Dept: OBSTETRICS AND GYNECOLOGY | Facility: CLINIC | Age: 63
End: 2023-01-19
Payer: COMMERCIAL

## 2023-02-10 ENCOUNTER — OFFICE VISIT (OUTPATIENT)
Dept: PAIN MEDICINE | Facility: CLINIC | Age: 63
End: 2023-02-10
Payer: COMMERCIAL

## 2023-02-10 VITALS
DIASTOLIC BLOOD PRESSURE: 68 MMHG | HEIGHT: 62 IN | SYSTOLIC BLOOD PRESSURE: 124 MMHG | BODY MASS INDEX: 20.14 KG/M2 | HEART RATE: 77 BPM | WEIGHT: 109.44 LBS

## 2023-02-10 DIAGNOSIS — M50.30 DDD (DEGENERATIVE DISC DISEASE), CERVICAL: Primary | ICD-10-CM

## 2023-02-10 PROCEDURE — 3074F PR MOST RECENT SYSTOLIC BLOOD PRESSURE < 130 MM HG: ICD-10-PCS | Mod: CPTII,S$GLB,, | Performed by: PHYSICIAN ASSISTANT

## 2023-02-10 PROCEDURE — 3078F PR MOST RECENT DIASTOLIC BLOOD PRESSURE < 80 MM HG: ICD-10-PCS | Mod: CPTII,S$GLB,, | Performed by: PHYSICIAN ASSISTANT

## 2023-02-10 PROCEDURE — 3078F DIAST BP <80 MM HG: CPT | Mod: CPTII,S$GLB,, | Performed by: PHYSICIAN ASSISTANT

## 2023-02-10 PROCEDURE — 3008F PR BODY MASS INDEX (BMI) DOCUMENTED: ICD-10-PCS | Mod: CPTII,S$GLB,, | Performed by: PHYSICIAN ASSISTANT

## 2023-02-10 PROCEDURE — 99213 OFFICE O/P EST LOW 20 MIN: CPT | Mod: S$GLB,,, | Performed by: PHYSICIAN ASSISTANT

## 2023-02-10 PROCEDURE — 99999 PR PBB SHADOW E&M-EST. PATIENT-LVL IV: CPT | Mod: PBBFAC,,, | Performed by: PHYSICIAN ASSISTANT

## 2023-02-10 PROCEDURE — 1160F RVW MEDS BY RX/DR IN RCRD: CPT | Mod: CPTII,S$GLB,, | Performed by: PHYSICIAN ASSISTANT

## 2023-02-10 PROCEDURE — 99999 PR PBB SHADOW E&M-EST. PATIENT-LVL IV: ICD-10-PCS | Mod: PBBFAC,,, | Performed by: PHYSICIAN ASSISTANT

## 2023-02-10 PROCEDURE — 3008F BODY MASS INDEX DOCD: CPT | Mod: CPTII,S$GLB,, | Performed by: PHYSICIAN ASSISTANT

## 2023-02-10 PROCEDURE — 1159F PR MEDICATION LIST DOCUMENTED IN MEDICAL RECORD: ICD-10-PCS | Mod: CPTII,S$GLB,, | Performed by: PHYSICIAN ASSISTANT

## 2023-02-10 PROCEDURE — 3074F SYST BP LT 130 MM HG: CPT | Mod: CPTII,S$GLB,, | Performed by: PHYSICIAN ASSISTANT

## 2023-02-10 PROCEDURE — 1160F PR REVIEW ALL MEDS BY PRESCRIBER/CLIN PHARMACIST DOCUMENTED: ICD-10-PCS | Mod: CPTII,S$GLB,, | Performed by: PHYSICIAN ASSISTANT

## 2023-02-10 PROCEDURE — 1159F MED LIST DOCD IN RCRD: CPT | Mod: CPTII,S$GLB,, | Performed by: PHYSICIAN ASSISTANT

## 2023-02-10 PROCEDURE — 99213 PR OFFICE/OUTPT VISIT, EST, LEVL III, 20-29 MIN: ICD-10-PCS | Mod: S$GLB,,, | Performed by: PHYSICIAN ASSISTANT

## 2023-02-10 RX ORDER — TRIAMCINOLONE ACETONIDE 1 MG/G
CREAM TOPICAL
COMMUNITY
Start: 2022-10-20

## 2023-02-10 RX ORDER — FLUOCINONIDE TOPICAL SOLUTION USP, 0.05% 0.5 MG/ML
SOLUTION TOPICAL
COMMUNITY
Start: 2023-01-09

## 2023-02-23 ENCOUNTER — OFFICE VISIT (OUTPATIENT)
Dept: OBSTETRICS AND GYNECOLOGY | Facility: CLINIC | Age: 63
End: 2023-02-23
Payer: COMMERCIAL

## 2023-02-23 ENCOUNTER — PATIENT MESSAGE (OUTPATIENT)
Dept: PRIMARY CARE CLINIC | Facility: CLINIC | Age: 63
End: 2023-02-23
Payer: COMMERCIAL

## 2023-02-23 VITALS
HEIGHT: 63 IN | DIASTOLIC BLOOD PRESSURE: 64 MMHG | BODY MASS INDEX: 19.57 KG/M2 | SYSTOLIC BLOOD PRESSURE: 100 MMHG | WEIGHT: 110.44 LBS

## 2023-02-23 DIAGNOSIS — Z79.890 HORMONE REPLACEMENT THERAPY (HRT): ICD-10-CM

## 2023-02-23 DIAGNOSIS — Z01.411 ENCOUNTER FOR GYNECOLOGICAL EXAMINATION (GENERAL) (ROUTINE) WITH ABNORMAL FINDINGS: Primary | ICD-10-CM

## 2023-02-23 DIAGNOSIS — B00.9 HERPES: ICD-10-CM

## 2023-02-23 PROCEDURE — 3074F PR MOST RECENT SYSTOLIC BLOOD PRESSURE < 130 MM HG: ICD-10-PCS | Mod: CPTII,S$GLB,, | Performed by: OBSTETRICS & GYNECOLOGY

## 2023-02-23 PROCEDURE — 99999 PR PBB SHADOW E&M-EST. PATIENT-LVL III: CPT | Mod: PBBFAC,,, | Performed by: OBSTETRICS & GYNECOLOGY

## 2023-02-23 PROCEDURE — 99396 PREV VISIT EST AGE 40-64: CPT | Mod: S$GLB,,, | Performed by: OBSTETRICS & GYNECOLOGY

## 2023-02-23 PROCEDURE — 3074F SYST BP LT 130 MM HG: CPT | Mod: CPTII,S$GLB,, | Performed by: OBSTETRICS & GYNECOLOGY

## 2023-02-23 PROCEDURE — 3078F PR MOST RECENT DIASTOLIC BLOOD PRESSURE < 80 MM HG: ICD-10-PCS | Mod: CPTII,S$GLB,, | Performed by: OBSTETRICS & GYNECOLOGY

## 2023-02-23 PROCEDURE — 99396 PR PREVENTIVE VISIT,EST,40-64: ICD-10-PCS | Mod: S$GLB,,, | Performed by: OBSTETRICS & GYNECOLOGY

## 2023-02-23 PROCEDURE — 3078F DIAST BP <80 MM HG: CPT | Mod: CPTII,S$GLB,, | Performed by: OBSTETRICS & GYNECOLOGY

## 2023-02-23 PROCEDURE — 3008F BODY MASS INDEX DOCD: CPT | Mod: CPTII,S$GLB,, | Performed by: OBSTETRICS & GYNECOLOGY

## 2023-02-23 PROCEDURE — 99999 PR PBB SHADOW E&M-EST. PATIENT-LVL III: ICD-10-PCS | Mod: PBBFAC,,, | Performed by: OBSTETRICS & GYNECOLOGY

## 2023-02-23 PROCEDURE — 3008F PR BODY MASS INDEX (BMI) DOCUMENTED: ICD-10-PCS | Mod: CPTII,S$GLB,, | Performed by: OBSTETRICS & GYNECOLOGY

## 2023-02-23 RX ORDER — ACYCLOVIR 200 MG/1
400 CAPSULE ORAL 3 TIMES DAILY
Qty: 60 CAPSULE | Refills: 1 | Status: SHIPPED | OUTPATIENT
Start: 2023-02-23 | End: 2023-08-28 | Stop reason: SDUPTHER

## 2023-02-23 RX ORDER — NORETHINDRONE 5 MG/1
5 TABLET ORAL DAILY
Qty: 90 TABLET | Refills: 3 | Status: SHIPPED | OUTPATIENT
Start: 2023-02-23 | End: 2023-12-20 | Stop reason: SDUPTHER

## 2023-02-23 RX ORDER — ESTRADIOL 1 MG/1
1 TABLET ORAL DAILY
Qty: 90 TABLET | Refills: 3 | Status: SHIPPED | OUTPATIENT
Start: 2023-02-23 | End: 2023-12-20

## 2023-02-23 NOTE — TELEPHONE ENCOUNTER
Care Due:                  Date            Visit Type   Department     Provider  --------------------------------------------------------------------------------                                MYCHART                              ANNUAL                              CHECKUP/PHY  [facility]  Last Visit: 04-      S            [facility]           Ashley  Giambrone                              MYCHART                              ANNUAL                              CHECKUP/PHY  [facility]  Next Visit: 04-      S            [facility]           Ashley  Giambrone                                                            Last  Test          Frequency    Reason                     Performed    Due Date  --------------------------------------------------------------------------------    CBC.........  12 months..  acyclovir................  04- 04-    Cr..........  12 months..  acyclovir................  04- 04-    [employer] Embedded Care Gaps. Reference number: [id number]. 2/23/2023   1:30:19 PM CST

## 2023-02-23 NOTE — PROGRESS NOTES
History and Physical:  Ashley Donnelly is a 62 y.o. F who presents today for her routine annual GYN exam. The patient has  no Gynecology complaints.    Annual:   Patient's last menstrual period was 07/01/2015.  Menopause: 56yr  Last Pap: 2019 NLIM/HPV neg  History of abnormal pap: 37yr ago, Cryo, normal since then.   Last Mammogram: 10/2022 BIRADS 1, Tyrer-Cuzick risk assessment model is 4.27 %.   Colonosocpy: 4/2018, repeat 10 years  DEXA: 4/2022 Osteopenia  PCP: Ashley Hairston MD orders routine labs 4/2022  Immunization status: up to date and documented. Status post Zoster     Desires refill of HRT, declines decreasing dose or stopping.     Allergies:   Review of patient's allergies indicates:   Allergen Reactions    Compazine [prochlorperazine edisylate] Other (See Comments)     Facial drooping     Past Medical History:   Diagnosis Date    Abnormal Pap smear of cervix     Anxiety     Arthritis     Basal cell carcinoma 11/2014    anterior chest    COVID-19     12/19/20 and possibly Dec. 2021    Herpes simplex     fever blisters    History of abnormal cervical Pap smear     Hormone replacement therapy (HRT)     Malignant melanoma of skin, unspecified     right arm    PONV (postoperative nausea and vomiting)     Seasonal allergies      Past Surgical History:   Procedure Laterality Date    AUGMENTATION OF BREAST      BREAST SURGERY      Breast Augmentation, X2    COLONOSCOPY N/A 04/26/2018    Procedure: COLONOSCOPY;  Surgeon: Mariano Kapoor MD;  Location: [address]);  Service: Endoscopy;  Laterality: N/A;    EPIDURAL STEROID INJECTION INTO CERVICAL SPINE N/A 02/11/2019    Procedure: Injection-steroid-epidural-cervical;  Surgeon: Liam Morgan MD;  [facility]: [facility];  Service: Pain Management;  Laterality: N/A;    EXCISION OF MELANOMA      NECK MASS EXCISION Right 1998    benign    SKIN CANCER EXCISION N/A 11/2014    anterior chest for basal cell     SKIN CANCER EXCISION  2017    BCC of scalp     MEDS:    Current Outpatient Medications on File Prior to Visit   Medication Sig Dispense Refill    biotin 5 mg Cap Take 1 capsule by mouth once daily.      EScitalopram oxalate (LEXAPRO) 10 MG tablet Take 1 tablet (10 mg total) by mouth once daily. 90 tablet 3    estradioL (ESTRACE) 1 MG tablet Take 1 tablet (1 mg total) by mouth once daily. 90 tablet 3    fluocinonide (LIDEX) 0.05 % external solution Apply topically.      fluticasone (FLONASE) 50 mcg/actuation nasal spray 2 sprays by Each Nare route once daily.  6    gabapentin (NEURONTIN) 300 MG capsule Take 2 capsules (600 mg total) by mouth every evening. 180 capsule 3    multivitamin capsule Take 1 capsule by mouth once daily.      norethindrone (AYGESTIN) 5 mg Tab Take 1 tablet (5 mg total) by mouth once daily. 30 tablet 11    ondansetron (ZOFRAN) 4 MG tablet Take 1 tablet (4 mg total) by mouth every 8 (eight) hours as needed for Nausea. 30 tablet 1    triamcinolone acetonide 0.1% (KENALOG) 0.1 % cream APPLY SPARINGLY TO AFFECTED AREA TWICE A DAY      VITAMIN B COMPLEX NO.12-NIACIN ORAL Take 1 tablet by mouth once daily.      [DISCONTINUED] acyclovir (ZOVIRAX) 200 MG capsule Take 2 capsules (400 mg total) by mouth 3 (three) times daily. for 5 days 60 capsule 1     No current facility-administered medications on file prior to visit.     OB History          1    Para   1    Term   1            AB        Living             SAB        IAB        Ectopic        Multiple        Live Births                   Social History     Socioeconomic History    Marital status:    Occupational History    Occupation:    Tobacco Use    Smoking status: Never    Smokeless tobacco: Never   Substance and Sexual Activity    Alcohol use: Yes     Comment: 2 gibran per month    Drug use: No    Sexual activity: Yes     Partners: Male     Birth control/protection: None, Post-menopausal   Social History Narrative    , 2 Grandchildren, Exercises,  elliptical body pump, looking after her dad     Social Determinants of Health     Financial Resource Strain: Low Risk     Difficulty of Paying Living Expenses: Not very hard   Food Insecurity: No Food Insecurity    Worried About Running Out of Food in the Last Year: Never true    Ran Out of Food in the Last Year: Never true   Transportation Needs: No Transportation Needs    Lack of Transportation (Medical): No    Lack of Transportation (Non-Medical): No   Physical Activity: Sufficiently Active    Days of Exercise per Week: 4 days    Minutes of Exercise per Session: 60 min   Stress: Stress Concern Present    Feeling of Stress : To some extent   Social Connections: Unknown    Frequency of Communication with Friends and Family: Twice a week    Frequency of Social Gatherings with Friends and Family: Once a week    Active Member of Clubs or Organizations: Yes    Attends Club or Organization Meetings: More than 4 times per year    Marital Status:    Housing Stability: Low Risk     Unable to Pay for Housing in the Last Year: No    Number of Places Lived in the Last Year: 1    Unstable Housing in the Last Year: No     Family History   Problem Relation Age of Onset    Heart disease Mother         chf    Hypertension Mother     Kidney disease Mother     Hyperlipidemia Mother     Cataracts Mother     Heart disease Father         afib    Hypertension Father     Anemia Father     Cataracts Father     No Known Problems Sister     Heart disease Brother         CAD    No Known Problems Brother     No Known Problems Brother     Colon cancer Maternal Grandmother     No Known Problems Daughter     Breast cancer Neg Hx     Ovarian cancer Neg Hx     Amblyopia Neg Hx     Blindness Neg Hx     Diabetes Neg Hx     Glaucoma Neg Hx     Macular degeneration Neg Hx     Retinal detachment Neg Hx     Strabismus Neg Hx     Stroke Neg Hx     Thyroid disease Neg Hx        Past medical and surgical history reviewed.   I have reviewed the  "patient's medical history in detail and updated the computerized patient record.    Review of System:   General: no chills, fever, night sweats, weight gain or weight loss  Breasts: no new or changing breast lumps, nipple discharge or masses.  Gastrointestinal: no abdominal pain, change in bowel habits, or black or bloody stools  Genito-Urinary: no incontinence, urinary frequency/urgency or vulvar/vaginal symptoms, pelvic pain or abnormal vaginal bleeding.    Physical Exam:   /64   Ht 5' 3" (1.6 m)   Wt 50.1 kg (110 lb 7.2 oz)   LMP 07/01/2015   BMI 19.57 kg/m²   Body mass index is 19.57 kg/m².  Constitutional: She appears alert and responsive. She appears well-developed, well-groomed, and well-nourished. No distress.  Breasts: are symmetrical.  Right breast exhibits no inverted nipple, no mass, no nipple discharge, no skin change and no tenderness.  Left breast exhibits no inverted nipple, no mass, no nipple discharge, no skin change and no tenderness.  Abdominal: Soft. She exhibits no distension, hernias or masses. There is no tenderness. No enlargement of liver edge or spleen.  There is no rebound and no guarding.   GYN: Deferred. Pap not indicated and no GYN complaints. Offered patient exam, patient declined exam.     Assessment/Plan:   Encounter for gynecological examination (general) (routine) with abnormal findings    Hormone replacement therapy (HRT)  -     estradioL (ESTRACE) 1 MG tablet; Take 1 tablet (1 mg total) by mouth once daily.  Dispense: 90 tablet; Refill: 3  -     norethindrone (AYGESTIN) 5 mg Tab; Take 1 tablet (5 mg total) by mouth once daily.  Dispense: 90 tablet; Refill: 3        Follow up in 1 year.    "

## 2023-02-23 NOTE — PROGRESS NOTES
"This note was completed with dictation software and grammatical errors may exist.    CC:Neck pain    HPI: The patient is a 62-year-old woman with no major past medical history who presents in referral from the Dr. Kathy Melton for neck pain, right shoulder pain.  She returns in follow-up today with neck pain.  She describes soreness in her neck that is tolerable as long as she takes gabapentin.  She continues to remain active, exercise and her pain does not keep her from doing anything.  She does have some increased pain with holding her grandchildren but otherwise she is doing well.  She denies having weakness or numbness.    Pain intervention history:   She is status post C7-T1 cervical interlaminar epidural steroid injection on 9/21/15 with 15% relief.  She is status post C7-T1 cervical interlaminar epidural steroid injection on 10/26/15 with 75% relief. She is status post C7-T1 cervical interlaminar epidural steroid injection on 02/11/2019 with over 90% relief.    Spine surgeries:    Antineuropathics:  Gabapentin 600 mg nightly  NSAIDs:  Physical therapy:She completed physical therapy at the [facility] with moderate relief.   Antidepressants:  Lexapro  Muscle relaxers:  Opioids:  Antiplatelets/Anticoagulants:    ROS: She reports difficulty sleeping.  Balance of review of systems is negative.    Medical, surgical, family and social history reviewed elsewhere in record.    Medications/Allergies: See med card    Vitals:    02/10/23 1320   BP: 124/68   Pulse: 77   Weight: 49.6 kg (109 lb 7.3 oz)   Height: 5' 2" (1.575 m)   PainSc:   2   PainLoc: Neck         Physical exam:  Gen: A and O x3, pleasant, well-groomed  Skin: No rashes or obvious lesions  HEENT: PERRLA, no obvious deformities on ears or in canals.  CVS: Regular rate and rhythm, normal S1 and S2, no murmurs.  Resp: Clear to auscultation bilaterally, no wheezes or rales.  Abdomen: Soft, NT/ND, normal bowel sounds present.  Musculoskeletal: No " antalgic gait.      Neuro:  Upper extremities: 5/5 strength bilaterally   Reflexes: Brachioradialis 2+, Bicep 2+, Tricep 2+  Sensory: Intact and symmetrical to light touch and pinprick in C2-T1 dermatomes bilaterally    Cervical Spine:  Cervical spine: Range of motion is full with flexion and extension and lateral rotation without significant increased pain.  Myofascial exam:  No tenderness to palpation to the cervical paraspinous muscles.    Imagin/2/15 MRI C-spine  The cervical spinal cord is normal in signal and contour. No abnormal intrathecal enhancement.  C2/C3:No significant disc bulge, central canal or neural foraminal stenosis.  C3/C4: No significant disk bulge, central canal or neural foraminal stenosis.  C4/C5: Trace disk bulge with a pituitary hypertrophy facet joint arthropathy with out significant center canal stenosis with mild/moderate left and mild right neural foraminal stenosis.  C5/C6: Posterior disk osteophyte with uncovertebral joint or bridging facet arthropathy with mild center canal and moderate bilateral foraminal stenosis.  C6/C7: Posterior disk osteophyte with uncovertebral joint hypertrophy and facet joint arthropathy with mild/moderate central canal stenosis and moderate bilateral neural foraminal stenosis.   C7/T1: No significant disc bulge, central canal or neural foraminal stenosis.     Assessment:  The patient is a 62-year-old woman with no major past medical history who presents in referral from the Dr. Kathy Melton for neck pain, right shoulder pain.  1. DDD (degenerative disc disease), cervical              Plan:  1. Overall she is doing well and will continue to take gabapentin 600 mg nightly.  If her pain worsens we can consider repeat cervical epidural steroid injection.    2. Follow-up in 1 year or sooner as needed.

## 2023-03-06 ENCOUNTER — PATIENT MESSAGE (OUTPATIENT)
Dept: PAIN MEDICINE | Facility: CLINIC | Age: 63
End: 2023-03-06
Payer: COMMERCIAL

## 2023-03-06 RX ORDER — GABAPENTIN 300 MG/1
600 CAPSULE ORAL NIGHTLY
Qty: 180 CAPSULE | Refills: 3 | Status: SHIPPED | OUTPATIENT
Start: 2023-03-06 | End: 2023-12-21 | Stop reason: SDUPTHER

## 2023-03-07 ENCOUNTER — PATIENT MESSAGE (OUTPATIENT)
Dept: PAIN MEDICINE | Facility: CLINIC | Age: 63
End: 2023-03-07
Payer: COMMERCIAL

## 2023-04-03 NOTE — PROGRESS NOTES
"[facility] Note    Chief Complaint      Chief Complaint   Patient presents with    Annual Exam       History of Present Illness      Ashley Donnelly is a 62 y.o. . WF with HSV, Basal call carcinoma, Postmenopausal HRT, Osteopenia,  DDD cervical spine,  Bulging cervial disk, Cervical Radiculopathy presents to fu for well visit. Last visit - 4/5/22    Osteopenia - DEXA - 4/27/22-showed osteopenia. I recommend you start OTC Calcium max 1000 mg/d and Vit D3 0419-1471 units/d OTC.  In addition, I rec weight-bearing  Rec exercise at least 30 minutes 3 times/wk and ideally 5 times/wk.  No specific intensity.  Walking is fine. I just rec you pick a form of weight-bearing exercise you enjoy to facilitate long term compliance and benefit. We can repeat your DEXA in  2-3 yrs.    Chronic diarrhea - +flatulence. Probiotic not helpful. "I take imodium a lot". Belching. No RUQ pain. "Even as a little girl I had diarrhea and vomiting". Check Abd U/S to r/o GB disease. Suspect coul d be IBS. Refer to GI, Dr. Holt. Rec adeq hydration.       H/o COVID - 19 - 12/18/20. Also, in Dec. 2021 but tested neg  But had Sx's.      HSV - Pt on Acyclovir prn.  Trigger - sun.      Basal cell Ca -  Fu by Derm.     Cervical Radiculopathy - Pt on gabapentin 600 mg QHS.  Fu by Dr. Morgan, Pain Mgmnt.      Anxiety - Pt doing well on Lexapro 10 mg/d.     Allergic rhinitis - Pt takes Claritin prn and Flonase prn.       Left upper lid ptosis - Does not bother line of vision.  Could consider fu with Dr. Mondragon or Dr. Montenegro - plastics.  Rec see Gen Ophtho for eye exam since not seen in > 2 yrs.      H/o Malignant Melanoma - Fu by Derm Q 3 mos.  S/p resection to lesion on Rt arm.doing well.       Postmenopausal HRT - Pt on Provera and Estrace. Managed by OB/GYN.     HCM - Flu - 10/24/22;  Tdap - 5/4/15;  PCV 13 - none;  PVX 23 - none;  Shingrix - 7/20/20 and 3 2 - 9/28/20;  COVID -19 Vaccine (Moderna) - # 1 - 2/25/21; # 3/25/21; # 3 - " 11/17/21; # 4 4/5/22; # 5 9/17/22; MGM -10/18/22 - repeat 1 yr;  DEXA - 4/27/22 - Osteopenia;  PAP - 2/6/19 - neg; Hep C Screen - 3/19/18 - neg;  HIV Screen - ?; C-scope - 4/26/18 -Diverticulosis - repeat 10 yrs;  Prev PCP - Dr. Espinoza; Pain Mgmnt - Dr. Morgan; Ob/GYN - Dr. De Oliveira; Derm - Dr. Morley; Ophtho - ?      Patient Care Team:  Ashley Hairston MD as PCP - General (Internal Medicine)  Luann Meehan MA as Care Coordinator     Health Maintenance:  Immunization History   Administered Date(s) Administered    COVID-19 MRNA, LN-S PF (MODERNA HALF 0.25 ML DOSE) 04/05/2022    COVID-19, MRNA, LN-S, PF (MODERNA FULL 0.5 ML DOSE) 02/25/2021, 03/25/2021, 11/17/2021, 04/05/2022    COVID-19, mRNA, LNP-S, bivalent booster, PF (Moderna Omicron) 09/17/2022    Influenza - Quadrivalent - MDCK - PF 10/24/2022    Influenza - Quadrivalent - PF *Preferred* (6 months and older) 09/11/2018, 10/17/2019, 09/14/2020, 09/15/2021    Tdap 05/04/2015    Zoster Recombinant 07/20/2020, 09/28/2020      Health Maintenance   Topic Date Due    Mammogram  10/18/2023    DEXA Scan  04/27/2024    TETANUS VACCINE  05/04/2025    Lipid Panel  04/26/2027    Hepatitis C Screening  Completed        Past Medical History:  Past Medical History:   Diagnosis Date    Abnormal Pap smear of cervix     Anxiety     Arthritis     Basal cell carcinoma 11/2014    anterior chest    COVID-19     12/19/20 and possibly Dec. 2021    Herpes simplex     fever blisters    History of abnormal cervical Pap smear     Hormone replacement therapy (HRT)     Malignant melanoma of skin, unspecified     right arm    PONV (postoperative nausea and vomiting)     Seasonal allergies        Past Surgical History:   has a past surgical history that includes Neck mass excision (Right, 1998); Skin cancer excision (N/A, 11/2014); Breast surgery; Skin cancer excision (2017); Colonoscopy (N/A, 04/26/2018); Epidural steroid injection into cervical spine (N/A, 02/11/2019);  Augmentation of breast; and Excision of melanoma.    Family History:  family history includes Anemia in her father; Cataracts in her father and mother; Colon cancer in her maternal grandmother; Heart disease in her brother, father, and mother; Hyperlipidemia in her mother; Hypertension in her father and mother; Kidney disease in her mother; No Known Problems in her brother, brother, daughter, and sister.     Social History:  Social History     Tobacco Use    Smoking status: Never    Smokeless tobacco: Never   Substance Use Topics    Alcohol use: Yes     Comment: 2 gibran per month    Drug use: No       Review of Systems   Constitutional:  Negative for activity change and unexpected weight change.   HENT:  Positive for rhinorrhea. Negative for hearing loss and trouble swallowing.    Eyes:  Negative for discharge and visual disturbance.   Respiratory:  Negative for chest tightness, shortness of breath and wheezing.    Cardiovascular:  Positive for palpitations. Negative for chest pain.        Occasionally heart races for 15 seconds.    Gastrointestinal:  Positive for diarrhea. Negative for blood in stool, constipation and vomiting.        See HPI   Endocrine: Negative for polydipsia and polyuria.   Genitourinary:  Negative for bladder incontinence, difficulty urinating, dysuria, hematuria, menstrual problem and vaginal bleeding.   Musculoskeletal:  Positive for neck pain. Negative for arthralgias and joint swelling.   Neurological:  Negative for weakness and headaches.   Psychiatric/Behavioral:  Negative for confusion and dysphoric mood.       Medications:    Current Outpatient Medications:     acyclovir (ZOVIRAX) 200 MG capsule, Take 2 capsules (400 mg total) by mouth 3 (three) times daily. for 5 days, Disp: 60 capsule, Rfl: 1    biotin 5 mg Cap, Take 1 capsule by mouth once daily., Disp: , Rfl:     CALCIUM ORAL, Take 1,000 mg by mouth once daily., Disp: , Rfl:     docosahexaenoic acid/epa (FISH OIL ORAL), Take by  "mouth., Disp: , Rfl:     EScitalopram oxalate (LEXAPRO) 10 MG tablet, Take 1 tablet (10 mg total) by mouth once daily., Disp: 90 tablet, Rfl: 3    estradioL (ESTRACE) 1 MG tablet, Take 1 tablet (1 mg total) by mouth once daily., Disp: 90 tablet, Rfl: 3    fluocinonide (LIDEX) 0.05 % external solution, Apply topically., Disp: , Rfl:     fluticasone (FLONASE) 50 mcg/actuation nasal spray, 2 sprays by Each Nare route once daily., Disp: , Rfl: 6    gabapentin (NEURONTIN) 300 MG capsule, Take 2 capsules (600 mg total) by mouth every evening., Disp: 180 capsule, Rfl: 3    multivitamin capsule, Take 1 capsule by mouth once daily., Disp: , Rfl:     norethindrone (AYGESTIN) 5 mg Tab, Take 1 tablet (5 mg total) by mouth once daily., Disp: 90 tablet, Rfl: 3    triamcinolone acetonide 0.1% (KENALOG) 0.1 % cream, APPLY SPARINGLY TO AFFECTED AREA TWICE A DAY, Disp: , Rfl:     VITAMIN B COMPLEX NO.12-NIACIN ORAL, Take 1 tablet by mouth once daily., Disp: , Rfl:     ondansetron (ZOFRAN) 4 MG tablet, Take 1 tablet (4 mg total) by mouth every 8 (eight) hours as needed for Nausea., Disp: 30 tablet, Rfl: 1     Allergies:  Review of patient's allergies indicates:   Allergen Reactions    Compazine [prochlorperazine edisylate] Other (See Comments)     Facial drooping       Physical Exam      Vital Signs  Temp: 98.1 °F (36.7 °C)  Temp Source: Oral  Pulse: 71  Resp: 16  SpO2: 98 %  BP: 118/74  BP Location: Left arm  Patient Position: Sitting  Pain Score: 0-No pain  Height and Weight  Height: 5' 3" (160 cm)  Weight: 50.5 kg (111 lb 5.3 oz)  BSA (Calculated - sq m): 1.5 sq meters  BMI (Calculated): 19.7  Weight in (lb) to have BMI = 25: 140.8      Patient Position: Sitting      Physical Exam  Vitals reviewed.   Constitutional:       General: She is not in acute distress.     Appearance: Normal appearance. She is not ill-appearing, toxic-appearing or diaphoretic.   HENT:      Head: Normocephalic and atraumatic.      Right Ear: Tympanic " membrane normal.      Left Ear: Tympanic membrane normal.      Mouth/Throat:      Mouth: Mucous membranes are dry.   Eyes:      Extraocular Movements: Extraocular movements intact.      Conjunctiva/sclera: Conjunctivae normal.      Pupils: Pupils are equal, round, and reactive to light.      Comments: Slight Left upper lid ptosis.    Neck:      Vascular: No carotid bruit.   Cardiovascular:      Rate and Rhythm: Normal rate and regular rhythm.      Pulses: Normal pulses.      Heart sounds: Normal heart sounds.   Pulmonary:      Effort: Pulmonary effort is normal. No respiratory distress.      Breath sounds: Normal breath sounds.   Abdominal:      General: Bowel sounds are normal. There is no distension.      Palpations: Abdomen is soft.      Tenderness: There is no abdominal tenderness. There is no guarding or rebound.   Musculoskeletal:      Cervical back: Neck supple. No tenderness.   Neurological:      General: No focal deficit present.      Mental Status: She is alert and oriented to person, place, and time.   Psychiatric:         Mood and Affect: Mood normal.         Behavior: Behavior normal.        Laboratory:  CBC:  Recent Labs   Lab 09/14/20 0816 04/26/22  0836   WBC 4.46 8.53   RBC 3.99 L 4.24   Hemoglobin 13.0 13.4   Hematocrit 40.6 41.1   Platelets 215 233    H 97   MCH 32.6 H 31.6 H   MCHC 32.0 32.6       CMP:  Recent Labs   Lab 09/14/20  0816 04/26/22  0836   Glucose 87 85   Calcium 8.7 9.1   Albumin 3.8 3.7   Total Protein 6.9 6.6   Sodium 136 137   Potassium 4.1 4.8   CO2 26 27   Chloride 102 103   BUN 17 24 H   Creatinine 0.8 0.7   Alkaline Phosphatase 43 L 49 L   ALT 11 17   AST 16 18   Total Bilirubin 0.5 0.4       LIPIDS:  Recent Labs   Lab 09/14/20  0816 04/26/22  0836   TSH 1.871 1.175   HDL 70 63   Cholesterol 172 171   Triglycerides 94 71   LDL Cholesterol 83.2 93.8   HDL/Cholesterol Ratio 40.7 36.8   Non-HDL Cholesterol 102 108   Total Cholesterol/HDL Ratio 2.5 2.7       TSH:  Recent  Labs   Lab 09/14/20  0816 04/26/22  0836   TSH 1.871 1.175       Assessment/Plan     Ashley Donnelly is a 62 y.o.female with:    Normal physical exam, routine  -     Comprehensive Metabolic Panel; Future; Expected date: 04/27/2023  -     CBC Auto Differential; Future; Expected date: 04/27/2023  -     TSH; Future; Expected date: 04/27/2023  - Performed today.  Will check Basic labs.  RTC in 1 yr for fu or sooner if needed    Chronic diarrhea  -     US Abdomen Limited; Future; Expected date: 04/06/2023  -     Ambulatory referral/consult to Gastroenterology; Future; Expected date: 04/13/2023  - Check RUQ U/S.  Refer to GI.     Osteopenia, unspecified location  - Stable.  Cont current regimen.    HSV infection  - Stable.  Cont current regimen.    Cervical radiculopathy  - Stable.  Cont current regimen.    Anxiety  - Controlled.  Cont current.     Postmenopausal HRT (hormone replacement therapy)  - stable. Managed by Ob.     Screening mammogram, encounter for  -     Mammo Digital Screening Bilat; Future; Expected date: 10/19/2023    Screening for lipoid disorders  -     Lipid Panel; Future; Expected date: 04/27/2023    Other orders  -     ondansetron (ZOFRAN) 4 MG tablet; Take 1 tablet (4 mg total) by mouth every 8 (eight) hours as needed for Nausea.  Dispense: 30 tablet; Refill: 1         Chronic conditions status updated as per HPI.  Other than changes above, cont current medications and maintain follow up with specialists.  Follow up in about 1 year (around 4/6/2024).      Ashley Hairston MD  [facility]

## 2023-04-06 ENCOUNTER — OFFICE VISIT (OUTPATIENT)
Dept: PRIMARY CARE CLINIC | Facility: CLINIC | Age: 63
End: 2023-04-06
Payer: COMMERCIAL

## 2023-04-06 VITALS
TEMPERATURE: 98 F | HEART RATE: 71 BPM | HEIGHT: 63 IN | OXYGEN SATURATION: 98 % | RESPIRATION RATE: 16 BRPM | SYSTOLIC BLOOD PRESSURE: 118 MMHG | BODY MASS INDEX: 19.72 KG/M2 | WEIGHT: 111.31 LBS | DIASTOLIC BLOOD PRESSURE: 74 MMHG

## 2023-04-06 DIAGNOSIS — K52.9 CHRONIC DIARRHEA: ICD-10-CM

## 2023-04-06 DIAGNOSIS — Z79.890 POSTMENOPAUSAL HRT (HORMONE REPLACEMENT THERAPY): ICD-10-CM

## 2023-04-06 DIAGNOSIS — Z00.00 NORMAL PHYSICAL EXAM, ROUTINE: Primary | ICD-10-CM

## 2023-04-06 DIAGNOSIS — F41.9 ANXIETY: ICD-10-CM

## 2023-04-06 DIAGNOSIS — M85.80 OSTEOPENIA, UNSPECIFIED LOCATION: ICD-10-CM

## 2023-04-06 DIAGNOSIS — Z13.220 SCREENING FOR LIPOID DISORDERS: ICD-10-CM

## 2023-04-06 DIAGNOSIS — M54.12 CERVICAL RADICULOPATHY: ICD-10-CM

## 2023-04-06 DIAGNOSIS — Z12.31 SCREENING MAMMOGRAM, ENCOUNTER FOR: ICD-10-CM

## 2023-04-06 DIAGNOSIS — B00.9 HSV INFECTION: ICD-10-CM

## 2023-04-06 PROCEDURE — 3078F PR MOST RECENT DIASTOLIC BLOOD PRESSURE < 80 MM HG: ICD-10-PCS | Mod: CPTII,S$GLB,, | Performed by: INTERNAL MEDICINE

## 2023-04-06 PROCEDURE — 99396 PREV VISIT EST AGE 40-64: CPT | Mod: S$GLB,,, | Performed by: INTERNAL MEDICINE

## 2023-04-06 PROCEDURE — 1160F RVW MEDS BY RX/DR IN RCRD: CPT | Mod: CPTII,S$GLB,, | Performed by: INTERNAL MEDICINE

## 2023-04-06 PROCEDURE — 3078F DIAST BP <80 MM HG: CPT | Mod: CPTII,S$GLB,, | Performed by: INTERNAL MEDICINE

## 2023-04-06 PROCEDURE — 3074F SYST BP LT 130 MM HG: CPT | Mod: CPTII,S$GLB,, | Performed by: INTERNAL MEDICINE

## 2023-04-06 PROCEDURE — 99999 PR PBB SHADOW E&M-EST. PATIENT-LVL V: CPT | Mod: PBBFAC,,, | Performed by: INTERNAL MEDICINE

## 2023-04-06 PROCEDURE — 3008F BODY MASS INDEX DOCD: CPT | Mod: CPTII,S$GLB,, | Performed by: INTERNAL MEDICINE

## 2023-04-06 PROCEDURE — 99999 PR PBB SHADOW E&M-EST. PATIENT-LVL V: ICD-10-PCS | Mod: PBBFAC,,, | Performed by: INTERNAL MEDICINE

## 2023-04-06 PROCEDURE — 1160F PR REVIEW ALL MEDS BY PRESCRIBER/CLIN PHARMACIST DOCUMENTED: ICD-10-PCS | Mod: CPTII,S$GLB,, | Performed by: INTERNAL MEDICINE

## 2023-04-06 PROCEDURE — 99396 PR PREVENTIVE VISIT,EST,40-64: ICD-10-PCS | Mod: S$GLB,,, | Performed by: INTERNAL MEDICINE

## 2023-04-06 PROCEDURE — 1159F PR MEDICATION LIST DOCUMENTED IN MEDICAL RECORD: ICD-10-PCS | Mod: CPTII,S$GLB,, | Performed by: INTERNAL MEDICINE

## 2023-04-06 PROCEDURE — 3074F PR MOST RECENT SYSTOLIC BLOOD PRESSURE < 130 MM HG: ICD-10-PCS | Mod: CPTII,S$GLB,, | Performed by: INTERNAL MEDICINE

## 2023-04-06 PROCEDURE — 3008F PR BODY MASS INDEX (BMI) DOCUMENTED: ICD-10-PCS | Mod: CPTII,S$GLB,, | Performed by: INTERNAL MEDICINE

## 2023-04-06 PROCEDURE — 1159F MED LIST DOCD IN RCRD: CPT | Mod: CPTII,S$GLB,, | Performed by: INTERNAL MEDICINE

## 2023-04-06 RX ORDER — ONDANSETRON 4 MG/1
4 TABLET, FILM COATED ORAL EVERY 8 HOURS PRN
Qty: 30 TABLET | Refills: 1 | Status: SHIPPED | OUTPATIENT
Start: 2023-04-06 | End: 2024-03-19 | Stop reason: SDUPTHER

## 2023-04-27 ENCOUNTER — LAB VISIT (OUTPATIENT)
Dept: LAB | Facility: HOSPITAL | Age: 63
End: 2023-04-27
Attending: INTERNAL MEDICINE
Payer: COMMERCIAL

## 2023-04-27 DIAGNOSIS — Z00.00 NORMAL PHYSICAL EXAM, ROUTINE: ICD-10-CM

## 2023-04-27 DIAGNOSIS — Z13.220 SCREENING FOR LIPOID DISORDERS: ICD-10-CM

## 2023-04-27 LAB
ALBUMIN SERPL BCP-MCNC: 3.6 G/DL (ref 3.5–5.2)
ALP SERPL-CCNC: 34 U/L (ref 55–135)
ALT SERPL W/O P-5'-P-CCNC: 13 U/L (ref 10–44)
ANION GAP SERPL CALC-SCNC: 7 MMOL/L (ref 8–16)
AST SERPL-CCNC: 14 U/L (ref 10–40)
BASOPHILS # BLD AUTO: 0.04 K/UL (ref 0–0.2)
BASOPHILS NFR BLD: 0.8 % (ref 0–1.9)
BILIRUB SERPL-MCNC: 0.3 MG/DL (ref 0.1–1)
BUN SERPL-MCNC: 20 MG/DL (ref 8–23)
CALCIUM SERPL-MCNC: 9.1 MG/DL (ref 8.7–10.5)
CHLORIDE SERPL-SCNC: 103 MMOL/L (ref 95–110)
CHOLEST SERPL-MCNC: 146 MG/DL (ref 120–199)
CHOLEST/HDLC SERPL: 3.2 {RATIO} (ref 2–5)
CO2 SERPL-SCNC: 26 MMOL/L (ref 23–29)
CREAT SERPL-MCNC: 0.7 MG/DL (ref 0.5–1.4)
DIFFERENTIAL METHOD: ABNORMAL
EOSINOPHIL # BLD AUTO: 0.1 K/UL (ref 0–0.5)
EOSINOPHIL NFR BLD: 1.9 % (ref 0–8)
ERYTHROCYTE [DISTWIDTH] IN BLOOD BY AUTOMATED COUNT: 12.5 % (ref 11.5–14.5)
EST. GFR  (NO RACE VARIABLE): >60 ML/MIN/1.73 M^2
GLUCOSE SERPL-MCNC: 90 MG/DL (ref 70–110)
HCT VFR BLD AUTO: 43 % (ref 37–48.5)
HDLC SERPL-MCNC: 45 MG/DL (ref 40–75)
HDLC SERPL: 30.8 % (ref 20–50)
HGB BLD-MCNC: 13.8 G/DL (ref 12–16)
IMM GRANULOCYTES # BLD AUTO: 0.01 K/UL (ref 0–0.04)
IMM GRANULOCYTES NFR BLD AUTO: 0.2 % (ref 0–0.5)
LDLC SERPL CALC-MCNC: 90 MG/DL (ref 63–159)
LYMPHOCYTES # BLD AUTO: 1.5 K/UL (ref 1–4.8)
LYMPHOCYTES NFR BLD: 28.8 % (ref 18–48)
MCH RBC QN AUTO: 32.5 PG (ref 27–31)
MCHC RBC AUTO-ENTMCNC: 32.1 G/DL (ref 32–36)
MCV RBC AUTO: 101 FL (ref 82–98)
MONOCYTES # BLD AUTO: 0.4 K/UL (ref 0.3–1)
MONOCYTES NFR BLD: 7.8 % (ref 4–15)
NEUTROPHILS # BLD AUTO: 3.1 K/UL (ref 1.8–7.7)
NEUTROPHILS NFR BLD: 60.5 % (ref 38–73)
NONHDLC SERPL-MCNC: 101 MG/DL
NRBC BLD-RTO: 0 /100 WBC
PLATELET # BLD AUTO: 233 K/UL (ref 150–450)
PMV BLD AUTO: 12 FL (ref 9.2–12.9)
POTASSIUM SERPL-SCNC: 4.5 MMOL/L (ref 3.5–5.1)
PROT SERPL-MCNC: 6.6 G/DL (ref 6–8.4)
RBC # BLD AUTO: 4.25 M/UL (ref 4–5.4)
SODIUM SERPL-SCNC: 136 MMOL/L (ref 136–145)
TRIGL SERPL-MCNC: 55 MG/DL (ref 30–150)
TSH SERPL DL<=0.005 MIU/L-ACNC: 1.81 UIU/ML (ref 0.4–4)
WBC # BLD AUTO: 5.14 K/UL (ref 3.9–12.7)

## 2023-04-27 PROCEDURE — 84443 ASSAY THYROID STIM HORMONE: CPT | Performed by: INTERNAL MEDICINE

## 2023-04-27 PROCEDURE — 80061 LIPID PANEL: CPT | Performed by: INTERNAL MEDICINE

## 2023-04-27 PROCEDURE — 36415 COLL VENOUS BLD VENIPUNCTURE: CPT | Mod: PO | Performed by: INTERNAL MEDICINE

## 2023-04-27 PROCEDURE — 85025 COMPLETE CBC W/AUTO DIFF WBC: CPT | Performed by: INTERNAL MEDICINE

## 2023-04-27 PROCEDURE — 80053 COMPREHEN METABOLIC PANEL: CPT | Performed by: INTERNAL MEDICINE

## 2023-04-28 ENCOUNTER — HOSPITAL ENCOUNTER (OUTPATIENT)
Dept: RADIOLOGY | Facility: HOSPITAL | Age: 63
Discharge: HOME | End: 2023-04-28
Attending: INTERNAL MEDICINE
Payer: COMMERCIAL

## 2023-04-28 ENCOUNTER — PATIENT MESSAGE (OUTPATIENT)
Dept: PRIMARY CARE CLINIC | Facility: CLINIC | Age: 63
End: 2023-04-28
Payer: COMMERCIAL

## 2023-04-28 ENCOUNTER — TELEPHONE (OUTPATIENT)
Dept: PRIMARY CARE CLINIC | Facility: CLINIC | Age: 63
End: 2023-04-28
Payer: COMMERCIAL

## 2023-04-28 DIAGNOSIS — K52.9 CHRONIC DIARRHEA: ICD-10-CM

## 2023-04-28 PROCEDURE — 76705 ECHO EXAM OF ABDOMEN: CPT | Mod: TC,PO

## 2023-04-28 PROCEDURE — 76705 US ABDOMEN LIMITED: ICD-10-PCS | Mod: 26,,, | Performed by: RADIOLOGY

## 2023-04-28 PROCEDURE — 76705 ECHO EXAM OF ABDOMEN: CPT | Mod: 26,,, | Performed by: RADIOLOGY

## 2023-04-28 NOTE — TELEPHONE ENCOUNTER
----- Message from Nhoemy Diggs sent at 4/28/2023  3:11 PM CDT -----  Contact: [phone]  Patient is returning a phone call.  Who left a message for the patient: Dr. Hairston  Does patient know what this is regarding:  test results  Would you like a call back, or a response through your [facility] portal?:   call back  Comments:

## 2023-04-28 NOTE — PROGRESS NOTES
Called pt.  No answer.  Left message to return call and I also sent her a [employer] message.  Please inform pt -  I called to review with you but missed you.  I reviewed your Abdominal Ultrasound which showed a small 8 mm liver cyst and multiple gallstones. The gallstones may be related to your flatulence/belching, and loose stools. I can refer you to a Gen Surgeon to further discuss.  Let me know if you have someone in particular you prefer to see.  Dr. MOFFETT

## 2023-04-28 NOTE — TELEPHONE ENCOUNTER
Pt advised that she got the US results but is requesting Dr. Hairston call her back about the results prior to going to General surgery.

## 2023-04-28 NOTE — PROGRESS NOTES
I sent pt a my chart message -  I reviewed your labs.  Your thyroid functions are normal.  Your Cholesterol looked good.  Your kidney function and liver functions looked good.  It does appear you could increase your hydration. You are not anemic. No further recommendations at this time.    Dr. MOFFETT

## 2023-05-02 ENCOUNTER — PATIENT MESSAGE (OUTPATIENT)
Dept: PRIMARY CARE CLINIC | Facility: CLINIC | Age: 63
End: 2023-05-02
Payer: COMMERCIAL

## 2023-05-02 DIAGNOSIS — K80.20 GALLSTONES: Primary | ICD-10-CM

## 2023-05-02 NOTE — TELEPHONE ENCOUNTER
Unfortunately, I don't know anyone on the [facility]. [facility] I would rec. Dr. Ruth Brandon . I placed a referral    Dr. MOFFETT

## 2023-05-17 ENCOUNTER — OFFICE VISIT (OUTPATIENT)
Dept: SURGERY | Facility: CLINIC | Age: 63
End: 2023-05-17
Payer: COMMERCIAL

## 2023-05-17 VITALS
BODY MASS INDEX: 19.61 KG/M2 | WEIGHT: 110.69 LBS | HEIGHT: 63 IN | SYSTOLIC BLOOD PRESSURE: 129 MMHG | HEART RATE: 71 BPM | DIASTOLIC BLOOD PRESSURE: 61 MMHG

## 2023-05-17 DIAGNOSIS — K80.20 GALLSTONES: Primary | ICD-10-CM

## 2023-05-17 DIAGNOSIS — K58.0 IRRITABLE BOWEL SYNDROME WITH DIARRHEA: ICD-10-CM

## 2023-05-17 PROCEDURE — 99999 PR PBB SHADOW E&M-EST. PATIENT-LVL IV: ICD-10-PCS | Mod: PBBFAC,,, | Performed by: SURGERY

## 2023-05-17 PROCEDURE — 3074F PR MOST RECENT SYSTOLIC BLOOD PRESSURE < 130 MM HG: ICD-10-PCS | Mod: CPTII,S$GLB,, | Performed by: SURGERY

## 2023-05-17 PROCEDURE — 99203 OFFICE O/P NEW LOW 30 MIN: CPT | Mod: S$GLB,,, | Performed by: SURGERY

## 2023-05-17 PROCEDURE — 1160F RVW MEDS BY RX/DR IN RCRD: CPT | Mod: CPTII,S$GLB,, | Performed by: SURGERY

## 2023-05-17 PROCEDURE — 1159F MED LIST DOCD IN RCRD: CPT | Mod: CPTII,S$GLB,, | Performed by: SURGERY

## 2023-05-17 PROCEDURE — 99999 PR PBB SHADOW E&M-EST. PATIENT-LVL IV: CPT | Mod: PBBFAC,,, | Performed by: SURGERY

## 2023-05-17 PROCEDURE — 1159F PR MEDICATION LIST DOCUMENTED IN MEDICAL RECORD: ICD-10-PCS | Mod: CPTII,S$GLB,, | Performed by: SURGERY

## 2023-05-17 PROCEDURE — 3074F SYST BP LT 130 MM HG: CPT | Mod: CPTII,S$GLB,, | Performed by: SURGERY

## 2023-05-17 PROCEDURE — 99203 PR OFFICE/OUTPT VISIT, NEW, LEVL III, 30-44 MIN: ICD-10-PCS | Mod: S$GLB,,, | Performed by: SURGERY

## 2023-05-17 PROCEDURE — 3078F DIAST BP <80 MM HG: CPT | Mod: CPTII,S$GLB,, | Performed by: SURGERY

## 2023-05-17 PROCEDURE — 3008F BODY MASS INDEX DOCD: CPT | Mod: CPTII,S$GLB,, | Performed by: SURGERY

## 2023-05-17 PROCEDURE — 3078F PR MOST RECENT DIASTOLIC BLOOD PRESSURE < 80 MM HG: ICD-10-PCS | Mod: CPTII,S$GLB,, | Performed by: SURGERY

## 2023-05-17 PROCEDURE — 1160F PR REVIEW ALL MEDS BY PRESCRIBER/CLIN PHARMACIST DOCUMENTED: ICD-10-PCS | Mod: CPTII,S$GLB,, | Performed by: SURGERY

## 2023-05-17 PROCEDURE — 3008F PR BODY MASS INDEX (BMI) DOCUMENTED: ICD-10-PCS | Mod: CPTII,S$GLB,, | Performed by: SURGERY

## 2023-05-17 NOTE — PROGRESS NOTES
"General Surgery Clinic  History and Physical    Subjective:     Ashley Donnelly is a 63 y.o. female with h/o chronic abdominal pain and diarrhea who presents to clinic for further evaluation of her GI symptoms. Patient says she has had "stomach issues" most of her life which consist of diarrhea, bloating, flatulence, and pain improving with defecation which has worsened over the past two years. She has come to be evaluated because she bas been needing to pass soft bowel movements three to five times a day especially after rich meals. Patient takes imodium for relief of diarrhea.     Abdominal US on 4/6/23 demonstrated stones <7mm in size without common bile duct dilation. Normal colonoscopy in 2018. Patient has not had right upper quadrant or shoulder pain, no fevers or chills. No nausea or vomiting.          PMH:   Past Medical History:   Diagnosis Date    Abnormal Pap smear of cervix     Anxiety     Arthritis     Basal cell carcinoma 11/2014    anterior chest    COVID-19     12/19/20 and possibly Dec. 2021    Herpes simplex     fever blisters    History of abnormal cervical Pap smear     Hormone replacement therapy (HRT)     Malignant melanoma of skin, unspecified     right arm    PONV (postoperative nausea and vomiting)     Seasonal allergies        Past Surgical History:   Past Surgical History:   Procedure Laterality Date    AUGMENTATION OF BREAST      BREAST SURGERY      Breast Augmentation, X2    COLONOSCOPY N/A 04/26/2018    Procedure: COLONOSCOPY;  Surgeon: Mariano Kapoor MD;  Location: [facility] ([address] [place] [address]);  Service: Endoscopy;  Laterality: N/A;    EPIDURAL STEROID INJECTION INTO CERVICAL SPINE N/A 02/11/2019    Procedure: Injection-steroid-epidural-cervical;  Surgeon: Liam Morgan MD;  Location: [facility];  Service: Pain Management;  Laterality: N/A;    EXCISION OF MELANOMA      NECK MASS EXCISION Right 1998    benign    SKIN CANCER EXCISION N/A 11/2014    anterior chest for basal cell     SKIN CANCER " EXCISION  2017    BCC of scalp       Social History:  Social History     Socioeconomic History    Marital status:    Occupational History    Occupation:    Tobacco Use    Smoking status: Never    Smokeless tobacco: Never   Substance and Sexual Activity    Alcohol use: Yes     Comment: 2 gibran per month    Drug use: No    Sexual activity: Yes     Partners: Male     Birth control/protection: None, Post-menopausal   Social History Narrative    , 2 Grandchildren, Exercises, elliptical body pump, looking after her dad     Social Determinants of Health     Financial Resource Strain: Low Risk     Difficulty of Paying Living Expenses: Not hard at all   Food Insecurity: No Food Insecurity    Worried About Running Out of Food in the Last Year: Never true    Ran Out of Food in the Last Year: Never true   Transportation Needs: No Transportation Needs    Lack of Transportation (Medical): No    Lack of Transportation (Non-Medical): No   Physical Activity: Sufficiently Active    Days of Exercise per Week: 4 days    Minutes of Exercise per Session: 60 min   Stress: No Stress Concern Present    Feeling of Stress : Only a little   Social Connections: Unknown    Frequency of Communication with Friends and Family: Three times a week    Frequency of Social Gatherings with Friends and Family: Twice a week    Active Member of Clubs or Organizations: Yes    Attends Club or Organization Meetings: 1 to 4 times per year    Marital Status:    Housing Stability: Low Risk     Unable to Pay for Housing in the Last Year: No    Number of Places Lived in the Last Year: 1    Unstable Housing in the Last Year: No       Allergies:   Review of patient's allergies indicates:   Allergen Reactions    Compazine [prochlorperazine edisylate] Other (See Comments)     Facial drooping       Medications:    Current Outpatient Medications on File Prior to Visit   Medication Sig Dispense Refill    acyclovir (ZOVIRAX) 200  MG capsule Take 2 capsules (400 mg total) by mouth 3 (three) times daily. for 5 days 60 capsule 1    biotin 5 mg Cap Take 1 capsule by mouth once daily.      CALCIUM ORAL Take 1,000 mg by mouth once daily.      docosahexaenoic acid/epa (FISH OIL ORAL) Take by mouth.      EScitalopram oxalate (LEXAPRO) 10 MG tablet TAKE 1 TABLET BY MOUTH EVERY DAY 90 tablet 3    estradioL (ESTRACE) 1 MG tablet Take 1 tablet (1 mg total) by mouth once daily. 90 tablet 3    fluocinonide (LIDEX) 0.05 % external solution Apply topically.      fluticasone (FLONASE) 50 mcg/actuation nasal spray 2 sprays by Each Nare route once daily.  6    gabapentin (NEURONTIN) 300 MG capsule Take 2 capsules (600 mg total) by mouth every evening. 180 capsule 3    multivitamin capsule Take 1 capsule by mouth once daily.      norethindrone (AYGESTIN) 5 mg Tab Take 1 tablet (5 mg total) by mouth once daily. 90 tablet 3    ondansetron (ZOFRAN) 4 MG tablet Take 1 tablet (4 mg total) by mouth every 8 (eight) hours as needed for Nausea. 30 tablet 1    triamcinolone acetonide 0.1% (KENALOG) 0.1 % cream APPLY SPARINGLY TO AFFECTED AREA TWICE A DAY      VITAMIN B COMPLEX NO.12-NIACIN ORAL Take 1 tablet by mouth once daily.       No current facility-administered medications on file prior to visit.         Objective:     PHYSICAL EXAM:  Vital Signs (Most Recent)       Review of Systems   Constitutional:  Negative for chills, fever, malaise/fatigue and weight loss.   Respiratory:  Negative for cough and shortness of breath.    Cardiovascular:  Positive for palpitations.   Gastrointestinal:  Positive for abdominal pain and diarrhea. Negative for constipation, heartburn, nausea and vomiting.  A 10+ review of systems was performed with pertinent positives and negatives noted above in the history of present illness.  Other systems were negative unless otherwise specified.    Physical Exam:  Physical exam deferred due to patient preference (patient asked to only be  examined by females)     Labs:  I have reviewed all pertinent lab results within the past 24 hours.    Imaging  The following imaging was reviewed: Abdominal US          Assessment:     63 y.o. female with history of chronic diarrhea and abdominal pain presented for further evaluation of GI symptoms. It is unlikely that her symptoms are caused by gallstones given their size (<7mm), the lack of common bile duct dilation, and the lack of any right upper quadrant pain. Her presentation of abdominal pain that improves with defecation and worsens upon consumption of large meals is more likely to be linked to irritable bowel syndrome with diarrhea (IBS-D).      Plan:     - Refer to GI   - Could consider a HIDA scan if other dietary and lifestyle modifications don't lead to improvement of symptoms.       Samy Branch  Medical Student

## 2023-06-12 ENCOUNTER — OFFICE VISIT (OUTPATIENT)
Dept: GASTROENTEROLOGY | Facility: CLINIC | Age: 63
End: 2023-06-12
Payer: COMMERCIAL

## 2023-06-12 ENCOUNTER — LAB VISIT (OUTPATIENT)
Dept: LAB | Facility: HOSPITAL | Age: 63
End: 2023-06-12
Attending: STUDENT IN AN ORGANIZED HEALTH CARE EDUCATION/TRAINING PROGRAM
Payer: COMMERCIAL

## 2023-06-12 VITALS
HEIGHT: 63 IN | BODY MASS INDEX: 19.88 KG/M2 | WEIGHT: 112.19 LBS | HEART RATE: 79 BPM | SYSTOLIC BLOOD PRESSURE: 115 MMHG | DIASTOLIC BLOOD PRESSURE: 75 MMHG

## 2023-06-12 DIAGNOSIS — K52.9 CHRONIC DIARRHEA: Primary | ICD-10-CM

## 2023-06-12 DIAGNOSIS — K52.9 CHRONIC DIARRHEA: ICD-10-CM

## 2023-06-12 LAB
CRP SERPL-MCNC: 4.5 MG/L (ref 0–8.2)
VIT B12 SERPL-MCNC: 497 PG/ML (ref 210–950)

## 2023-06-12 PROCEDURE — 99204 PR OFFICE/OUTPT VISIT, NEW, LEVL IV, 45-59 MIN: ICD-10-PCS | Mod: S$GLB,,, | Performed by: STUDENT IN AN ORGANIZED HEALTH CARE EDUCATION/TRAINING PROGRAM

## 2023-06-12 PROCEDURE — 99204 OFFICE O/P NEW MOD 45 MIN: CPT | Mod: S$GLB,,, | Performed by: STUDENT IN AN ORGANIZED HEALTH CARE EDUCATION/TRAINING PROGRAM

## 2023-06-12 PROCEDURE — 86140 C-REACTIVE PROTEIN: CPT | Performed by: STUDENT IN AN ORGANIZED HEALTH CARE EDUCATION/TRAINING PROGRAM

## 2023-06-12 PROCEDURE — 1159F MED LIST DOCD IN RCRD: CPT | Mod: CPTII,S$GLB,, | Performed by: STUDENT IN AN ORGANIZED HEALTH CARE EDUCATION/TRAINING PROGRAM

## 2023-06-12 PROCEDURE — 3008F BODY MASS INDEX DOCD: CPT | Mod: CPTII,S$GLB,, | Performed by: STUDENT IN AN ORGANIZED HEALTH CARE EDUCATION/TRAINING PROGRAM

## 2023-06-12 PROCEDURE — 86364 TISS TRNSGLTMNASE EA IG CLAS: CPT | Performed by: STUDENT IN AN ORGANIZED HEALTH CARE EDUCATION/TRAINING PROGRAM

## 2023-06-12 PROCEDURE — 82607 VITAMIN B-12: CPT | Performed by: STUDENT IN AN ORGANIZED HEALTH CARE EDUCATION/TRAINING PROGRAM

## 2023-06-12 PROCEDURE — 3078F PR MOST RECENT DIASTOLIC BLOOD PRESSURE < 80 MM HG: ICD-10-PCS | Mod: CPTII,S$GLB,, | Performed by: STUDENT IN AN ORGANIZED HEALTH CARE EDUCATION/TRAINING PROGRAM

## 2023-06-12 PROCEDURE — 36415 COLL VENOUS BLD VENIPUNCTURE: CPT | Performed by: STUDENT IN AN ORGANIZED HEALTH CARE EDUCATION/TRAINING PROGRAM

## 2023-06-12 PROCEDURE — 99999 PR PBB SHADOW E&M-EST. PATIENT-LVL IV: CPT | Mod: PBBFAC,,, | Performed by: STUDENT IN AN ORGANIZED HEALTH CARE EDUCATION/TRAINING PROGRAM

## 2023-06-12 PROCEDURE — 1159F PR MEDICATION LIST DOCUMENTED IN MEDICAL RECORD: ICD-10-PCS | Mod: CPTII,S$GLB,, | Performed by: STUDENT IN AN ORGANIZED HEALTH CARE EDUCATION/TRAINING PROGRAM

## 2023-06-12 PROCEDURE — 3074F SYST BP LT 130 MM HG: CPT | Mod: CPTII,S$GLB,, | Performed by: STUDENT IN AN ORGANIZED HEALTH CARE EDUCATION/TRAINING PROGRAM

## 2023-06-12 PROCEDURE — 99999 PR PBB SHADOW E&M-EST. PATIENT-LVL IV: ICD-10-PCS | Mod: PBBFAC,,, | Performed by: STUDENT IN AN ORGANIZED HEALTH CARE EDUCATION/TRAINING PROGRAM

## 2023-06-12 PROCEDURE — 3078F DIAST BP <80 MM HG: CPT | Mod: CPTII,S$GLB,, | Performed by: STUDENT IN AN ORGANIZED HEALTH CARE EDUCATION/TRAINING PROGRAM

## 2023-06-12 PROCEDURE — 3008F PR BODY MASS INDEX (BMI) DOCUMENTED: ICD-10-PCS | Mod: CPTII,S$GLB,, | Performed by: STUDENT IN AN ORGANIZED HEALTH CARE EDUCATION/TRAINING PROGRAM

## 2023-06-12 PROCEDURE — 3074F PR MOST RECENT SYSTOLIC BLOOD PRESSURE < 130 MM HG: ICD-10-PCS | Mod: CPTII,S$GLB,, | Performed by: STUDENT IN AN ORGANIZED HEALTH CARE EDUCATION/TRAINING PROGRAM

## 2023-06-12 RX ORDER — DICYCLOMINE HYDROCHLORIDE 10 MG/1
10 CAPSULE ORAL
Qty: 120 CAPSULE | Refills: 0 | Status: SHIPPED | OUTPATIENT
Start: 2023-06-12 | End: 2023-07-05

## 2023-06-12 NOTE — PROGRESS NOTES
"    [facility] Consultation Note    Reason for Consult:  The encounter diagnosis was Chronic diarrhea.    PCP:   Ashley Hairston   [address] [place] [id number]    Referring MD:  Ashley Hairston Md  [address] [place] [address]    HPI:  This is a 63 y.o. female here for evaluation of chronic diarrhea. She states she has had a sensitive stomach for years but seems to be more bothersome recently. Typically has around 4 BM per day which are mushy. Triggered by meals and typically has BM after eating. Does not have to get up from eating to have BM but does have urgency at times. No blood in stool, weight loss, constipation. She also has had more issues with belching and bloating. No reflux, dysphagia, hematemesis, early satiety. Recent blood work showed normal TSH. CBC showed MCV of 101 but otherwise normal. CMP unremarkable. US 2023 showed liver cyst and cholelithiasis. Last cscope from 2018 showed diverticulosis but no polyps. Recommended repeat in 10 years. Denied NSAID use. No Fh of CRC, gastric cancer, celiac or IBD.  No recent medication changes, travel or viral illnesses. Takes imodium if she needs to avoid diarrhea which does help.         Objective Findings:    Vital Signs:  /75   Pulse 79   Ht 5' 3" (1.6 m)   Wt 50.9 kg (112 lb 3.4 oz)   LMP 07/01/2015   BMI 19.88 kg/m²   Body mass index is 19.88 kg/m².    Physical Exam:  General Appearance: Well appearing in no acute distress  Head:   Normocephalic, without obvious abnormality  Eyes:    No scleral icterus    Lungs: CTA bilaterally in anterior and posterior fields   Heart:  Regular rate and rhythm, no murmurs heard  Abdomen: Soft, non tender, non distended with positive bowel sounds in all four quadrants.      Imaging:    US 2023      FINDINGS:  Pancreas demonstrates no obvious abnormality in its visualized portion, it is incompletely displayed.  The hepatic confluence demonstrates no " obvious abnormality in its visualized portions.  A cyst is noted within the left lobe of the liver of 8 mm.  The gallbladder demonstrates multiple echogenicities within it suggestive of stones the largest of approximately 7 mm.  No evidence of gallbladder wall thickening, pericholecystic fluid, or reported sonographic Hinds sign is noted.  Gallbladder is not 25-50% filled with stones.  A common bile duct of 3 mm is noted within normal limits.  The inferior vena cava appears patent at the level of the liver.  The liver measures 14.9 cm and demonstrates near normal echotexture.  The main portal vein appears patent.  The right kidney measures 10.0 cm and demonstrates no evidence of obstruction.     Impression:     No significant sonographic abnormality.    Endoscopy:      Cscope 2018        Multiple small and large-mouthed diverticula were found from sigmoid        to transverse colon.        The exam was otherwise without abnormality.     Assessment:    Chronic diarrhea    Diverticular disease     Patient with a few years of worsening post prandial diarrhea which is associated with belching and bloating. No alarm features. UTD on cscope. Check celiac serologies, CRP as well as calprotectin and fecal elastase. If normal, likely IBS and would refer to nutrition and consider xifaxan course as there is also bloating/belching. Consider earlier endoscopy if calpro is elevated to rule out microscopic colitis or IBD     Recommendations:     - CRP, Celiac   - B12 due to macrocytosis noted on labs   - Calprotectin and fecal elastase   - Start fiber supplement   - Bentyl PRN for cramping   - If there is evidence of inflammation, would proceed with earlier colonoscopy   - If normal, would try xifaxin or elavil for IBS-D      Follow up in about 3 months (around 9/12/2023).      Order summary:  Orders Placed This Encounter    VITAMIN B12    C-reactive protein    Calprotectin, Stool    Celiac Disease Panel    Pancreatic elastase,  fecal    dicyclomine (BENTYL) 10 MG capsule         Thank you so much for allowing me to participate in the care of Ashley Holt MD  Gastroenterology   [facility]

## 2023-06-12 NOTE — PATIENT INSTRUCTIONS
Fiber (psyllium) daily   Drink 6-8 glasses of water   Bentyl as needed for cramping or before meals

## 2023-06-13 ENCOUNTER — LAB VISIT (OUTPATIENT)
Dept: LAB | Facility: HOSPITAL | Age: 63
End: 2023-06-13
Attending: STUDENT IN AN ORGANIZED HEALTH CARE EDUCATION/TRAINING PROGRAM
Payer: COMMERCIAL

## 2023-06-13 DIAGNOSIS — K52.9 CHRONIC DIARRHEA: ICD-10-CM

## 2023-06-13 PROCEDURE — 83993 ASSAY FOR CALPROTECTIN FECAL: CPT | Performed by: STUDENT IN AN ORGANIZED HEALTH CARE EDUCATION/TRAINING PROGRAM

## 2023-06-13 PROCEDURE — 82653 EL-1 FECAL QUANTITATIVE: CPT | Performed by: STUDENT IN AN ORGANIZED HEALTH CARE EDUCATION/TRAINING PROGRAM

## 2023-06-15 LAB
GLIADIN PEPTIDE IGA SER-ACNC: 0.5 U/ML
GLIADIN PEPTIDE IGG SER-ACNC: <0.6 U/ML
IGA SERPL-MCNC: 234 MG/DL (ref 70–400)
TTG IGA SER-ACNC: 0.5 U/ML
TTG IGG SER-ACNC: <0.6 U/ML

## 2023-06-17 LAB — ELASTASE 1, FECAL: 237 MCG/G

## 2023-06-19 ENCOUNTER — TELEPHONE (OUTPATIENT)
Dept: ENDOSCOPY | Facility: HOSPITAL | Age: 63
End: 2023-06-19
Payer: COMMERCIAL

## 2023-06-19 ENCOUNTER — TELEPHONE (OUTPATIENT)
Dept: GASTROENTEROLOGY | Facility: CLINIC | Age: 63
End: 2023-06-19
Payer: COMMERCIAL

## 2023-06-19 LAB — CALPROTECTIN STL-MCNT: <27.1 MCG/G

## 2023-06-19 NOTE — TELEPHONE ENCOUNTER
Called Patient P/'s request to meet with nutrition in 1 month. Offered Patient appointment for 7/19/23 for 2:00PM. Patient confirmed and verbalized understanding.

## 2023-06-19 NOTE — TELEPHONE ENCOUNTER
----- Message from Yamileth Holt MD sent at 6/19/2023 11:22 AM CDT -----  Can you make this patient GI nutrition appt for IBS-D

## 2023-06-23 NOTE — PROGRESS NOTES
History & Physical    SUBJECTIVE:     History of Present Illness:  Patient is a 63 y.o. female with cervical disc disease, osteopenia, and anxiety, who presents for evaluation of chronic abdominal pain and diarrhea in the setting of recently noted gallstones. She endorses a life-long history of cramping abdominal pain, bloating, flatulence, and diarrhea, exacerbated by eating, and stress, and progressively worsening over the last couple of years. She has tried probiotics without improvement and takes Imodium PRN. She denies any RUQ abdominal pain after meals, during the night, or otherwise, and denies post-prandial nausea or vomiting. Her symptoms improve somewhat with defecation. Abd US 4/28/23 demonstrated multiple gallstones measuring up to 7 mm without e/o cholecystitis or biliary tree dilation.    Chief Complaint   Patient presents with    Cholelithiasis    Diarrhea     Review of patient's allergies indicates:   Allergen Reactions    Compazine [prochlorperazine edisylate] Other (See Comments)     Facial drooping     Current Outpatient Medications   Medication Sig Dispense Refill    biotin 5 mg Cap Take 1 capsule by mouth once daily.      CALCIUM ORAL Take 1,000 mg by mouth once daily.      docosahexaenoic acid/epa (FISH OIL ORAL) Take by mouth.      EScitalopram oxalate (LEXAPRO) 10 MG tablet TAKE 1 TABLET BY MOUTH EVERY DAY 90 tablet 3    estradioL (ESTRACE) 1 MG tablet Take 1 tablet (1 mg total) by mouth once daily. 90 tablet 3    fluocinonide (LIDEX) 0.05 % external solution Apply topically.      fluticasone (FLONASE) 50 mcg/actuation nasal spray 2 sprays by Each Nare route once daily.  6    gabapentin (NEURONTIN) 300 MG capsule Take 2 capsules (600 mg total) by mouth every evening. 180 capsule 3    multivitamin capsule Take 1 capsule by mouth once daily.      norethindrone (AYGESTIN) 5 mg Tab Take 1 tablet (5 mg total) by mouth once daily. 90 tablet 3    ondansetron (ZOFRAN) 4 MG tablet Take 1 tablet (4 mg  total) by mouth every 8 (eight) hours as needed for Nausea. 30 tablet 1    triamcinolone acetonide 0.1% (KENALOG) 0.1 % cream APPLY SPARINGLY TO AFFECTED AREA TWICE A DAY      VITAMIN B COMPLEX NO.12-NIACIN ORAL Take 1 tablet by mouth once daily.      acyclovir (ZOVIRAX) 200 MG capsule Take 2 capsules (400 mg total) by mouth 3 (three) times daily. for 5 days 60 capsule 1    dicyclomine (BENTYL) 10 MG capsule Take 1 capsule (10 mg total) by mouth before meals and at bedtime as needed (cramping). 120 capsule 0     No current facility-administered medications for this visit.     Past Medical History:   Diagnosis Date    Abnormal Pap smear of cervix     Anxiety     Arthritis     Basal cell carcinoma 11/2014    anterior chest    COVID-19     12/19/20 and possibly Dec. 2021    Herpes simplex     fever blisters    History of abnormal cervical Pap smear     Hormone replacement therapy (HRT)     Malignant melanoma of skin, unspecified     right arm    PONV (postoperative nausea and vomiting)     Seasonal allergies      Past Surgical History:   Procedure Laterality Date    AUGMENTATION OF BREAST      BREAST SURGERY      Breast Augmentation, X2    COLONOSCOPY N/A 04/26/2018    Procedure: COLONOSCOPY;  Surgeon: Mariano Kapoor MD;  Location: [facility] ([address] [place] [address]);  Service: Endoscopy;  Laterality: N/A;    EPIDURAL STEROID INJECTION INTO CERVICAL SPINE N/A 02/11/2019    Procedure: Injection-steroid-epidural-cervical;  Surgeon: Liam Morgan MD;  Location: [facility];  Service: Pain Management;  Laterality: N/A;    EXCISION OF MELANOMA      NECK MASS EXCISION Right 1998    benign    SKIN CANCER EXCISION N/A 11/2014    anterior chest for basal cell     SKIN CANCER EXCISION  2017    BCC of scalp     Family History   Problem Relation Age of Onset    Heart disease Mother         chf    Hypertension Mother     Kidney disease Mother     Hyperlipidemia Mother     Cataracts Mother     Heart disease Father         afib    Hypertension  "Father     Anemia Father     Cataracts Father     No Known Problems Sister     Heart disease Brother         CAD    No Known Problems Brother     No Known Problems Brother     Colon cancer Maternal Grandmother     No Known Problems Daughter     Breast cancer Neg Hx     Ovarian cancer Neg Hx     Amblyopia Neg Hx     Blindness Neg Hx     Diabetes Neg Hx     Glaucoma Neg Hx     Macular degeneration Neg Hx     Retinal detachment Neg Hx     Strabismus Neg Hx     Stroke Neg Hx     Thyroid disease Neg Hx      Social History     Tobacco Use    Smoking status: Never    Smokeless tobacco: Never   Substance Use Topics    Alcohol use: Yes     Comment: 2 gibran per month    Drug use: No      Review of Systems:  Review of Systems   Constitutional: Negative.  Negative for fever.   HENT: Negative.     Eyes: Negative.    Respiratory: Negative.     Cardiovascular:  Positive for palpitations.   Gastrointestinal:  Positive for abdominal pain and diarrhea. Negative for nausea and vomiting.   Endocrine: Negative.    Genitourinary: Negative.    Musculoskeletal: Negative.    Skin: Negative.    Allergic/Immunologic: Negative.    Neurological: Negative.    Hematological: Negative.    Psychiatric/Behavioral: Negative.       OBJECTIVE:     Vital Signs (Most Recent)  Pulse: 71 (05/17/23 1303)  BP: 129/61 (05/17/23 1303)  5' 3" (1.6 m)  50.2 kg (110 lb 10.7 oz)     Physical Exam:  Physical Exam  Vitals reviewed.   Constitutional:       General: She is not in acute distress.     Appearance: Normal appearance. She is well-developed. She is not ill-appearing.   HENT:      Head: Normocephalic and atraumatic.   Eyes:      General: No scleral icterus.     Conjunctiva/sclera: Conjunctivae normal.   Cardiovascular:      Rate and Rhythm: Normal rate and regular rhythm.      Heart sounds: Normal heart sounds.   Pulmonary:      Effort: Pulmonary effort is normal. No respiratory distress.      Breath sounds: Normal breath sounds.   Abdominal:      General: " There is no distension.      Palpations: Abdomen is soft.      Tenderness: There is no abdominal tenderness. There is no guarding.   Musculoskeletal:         General: Normal range of motion.      Cervical back: Normal range of motion and neck supple.   Skin:     General: Skin is warm and dry.      Coloration: Skin is not jaundiced.   Neurological:      General: No focal deficit present.      Mental Status: She is alert and oriented to person, place, and time.   Psychiatric:         Mood and Affect: Mood normal.         Behavior: Behavior normal.     Laboratory  CBC: Reviewed  CMP: Reviewed  Normal LFTs    Diagnostic Results:  US: Reviewed    ASSESSMENT/PLAN:   Patient is a 63 year-old woman with asymptomatic cholelithiasis and symptoms c/w irritable bowel syndrome. Recommend GI consult and management. Could consider HIDA though I suspect yield is low. Discussed s/s of biliary colic or symptomatic cholelithiasis, for which she should return for re-eval. All questions were answered to her satisfaction and she is in agreement with the plan. It was a pleasure meeting Mrs. Donnelly and thank you for this consultation.    Ruth Brandon  5/17/23

## 2023-07-05 DIAGNOSIS — K52.9 CHRONIC DIARRHEA: ICD-10-CM

## 2023-07-05 RX ORDER — DICYCLOMINE HYDROCHLORIDE 10 MG/1
10 CAPSULE ORAL
Qty: 120 CAPSULE | Refills: 0 | Status: SHIPPED | OUTPATIENT
Start: 2023-07-05 | End: 2023-08-04

## 2023-08-14 ENCOUNTER — PATIENT MESSAGE (OUTPATIENT)
Dept: ADMINISTRATIVE | Facility: HOSPITAL | Age: 63
End: 2023-08-14
Payer: COMMERCIAL

## 2023-08-24 ENCOUNTER — PATIENT OUTREACH (OUTPATIENT)
Dept: ADMINISTRATIVE | Facility: HOSPITAL | Age: 63
End: 2023-08-24
Payer: COMMERCIAL

## 2023-08-24 NOTE — PROGRESS NOTES
Patient due for the following   Health Maintenance Due    HIV Screening     Mammogram       Immunizations: reviewed and updated  Care Everywhere: triggered  Care Teams: up to date  Outreach: completed

## 2023-08-28 DIAGNOSIS — B00.9 HERPES: ICD-10-CM

## 2023-08-28 RX ORDER — ACYCLOVIR 200 MG/1
400 CAPSULE ORAL 3 TIMES DAILY
Qty: 60 CAPSULE | Refills: 0 | Status: SHIPPED | OUTPATIENT
Start: 2023-08-28 | End: 2024-03-19

## 2023-08-28 NOTE — TELEPHONE ENCOUNTER
No care due was identified.  [facility] Embedded Care Due Messages. Reference number: [id number].   8/28/2023 9:49:33 AM CDT

## 2023-09-12 ENCOUNTER — OFFICE VISIT (OUTPATIENT)
Dept: GASTROENTEROLOGY | Facility: CLINIC | Age: 63
End: 2023-09-12
Payer: COMMERCIAL

## 2023-09-12 VITALS
SYSTOLIC BLOOD PRESSURE: 113 MMHG | HEIGHT: 63 IN | WEIGHT: 114.88 LBS | DIASTOLIC BLOOD PRESSURE: 76 MMHG | BODY MASS INDEX: 20.36 KG/M2 | HEART RATE: 71 BPM

## 2023-09-12 DIAGNOSIS — R19.7 DIARRHEA, UNSPECIFIED TYPE: Primary | ICD-10-CM

## 2023-09-12 PROCEDURE — 1159F PR MEDICATION LIST DOCUMENTED IN MEDICAL RECORD: ICD-10-PCS | Mod: CPTII,S$GLB,, | Performed by: STUDENT IN AN ORGANIZED HEALTH CARE EDUCATION/TRAINING PROGRAM

## 2023-09-12 PROCEDURE — 99999 PR PBB SHADOW E&M-EST. PATIENT-LVL III: ICD-10-PCS | Mod: PBBFAC,,, | Performed by: STUDENT IN AN ORGANIZED HEALTH CARE EDUCATION/TRAINING PROGRAM

## 2023-09-12 PROCEDURE — 3078F PR MOST RECENT DIASTOLIC BLOOD PRESSURE < 80 MM HG: ICD-10-PCS | Mod: CPTII,S$GLB,, | Performed by: STUDENT IN AN ORGANIZED HEALTH CARE EDUCATION/TRAINING PROGRAM

## 2023-09-12 PROCEDURE — 3078F DIAST BP <80 MM HG: CPT | Mod: CPTII,S$GLB,, | Performed by: STUDENT IN AN ORGANIZED HEALTH CARE EDUCATION/TRAINING PROGRAM

## 2023-09-12 PROCEDURE — 1159F MED LIST DOCD IN RCRD: CPT | Mod: CPTII,S$GLB,, | Performed by: STUDENT IN AN ORGANIZED HEALTH CARE EDUCATION/TRAINING PROGRAM

## 2023-09-12 PROCEDURE — 99213 OFFICE O/P EST LOW 20 MIN: CPT | Mod: S$GLB,,, | Performed by: STUDENT IN AN ORGANIZED HEALTH CARE EDUCATION/TRAINING PROGRAM

## 2023-09-12 PROCEDURE — 3074F SYST BP LT 130 MM HG: CPT | Mod: CPTII,S$GLB,, | Performed by: STUDENT IN AN ORGANIZED HEALTH CARE EDUCATION/TRAINING PROGRAM

## 2023-09-12 PROCEDURE — 99213 PR OFFICE/OUTPT VISIT, EST, LEVL III, 20-29 MIN: ICD-10-PCS | Mod: S$GLB,,, | Performed by: STUDENT IN AN ORGANIZED HEALTH CARE EDUCATION/TRAINING PROGRAM

## 2023-09-12 PROCEDURE — 3008F PR BODY MASS INDEX (BMI) DOCUMENTED: ICD-10-PCS | Mod: CPTII,S$GLB,, | Performed by: STUDENT IN AN ORGANIZED HEALTH CARE EDUCATION/TRAINING PROGRAM

## 2023-09-12 PROCEDURE — 3008F BODY MASS INDEX DOCD: CPT | Mod: CPTII,S$GLB,, | Performed by: STUDENT IN AN ORGANIZED HEALTH CARE EDUCATION/TRAINING PROGRAM

## 2023-09-12 PROCEDURE — 3074F PR MOST RECENT SYSTOLIC BLOOD PRESSURE < 130 MM HG: ICD-10-PCS | Mod: CPTII,S$GLB,, | Performed by: STUDENT IN AN ORGANIZED HEALTH CARE EDUCATION/TRAINING PROGRAM

## 2023-09-12 PROCEDURE — 99999 PR PBB SHADOW E&M-EST. PATIENT-LVL III: CPT | Mod: PBBFAC,,, | Performed by: STUDENT IN AN ORGANIZED HEALTH CARE EDUCATION/TRAINING PROGRAM

## 2023-09-12 RX ORDER — DICYCLOMINE HYDROCHLORIDE 10 MG/1
10 CAPSULE ORAL
Qty: 120 CAPSULE | Refills: 2 | Status: SHIPPED | OUTPATIENT
Start: 2023-09-12 | End: 2023-12-11

## 2023-09-12 NOTE — PROGRESS NOTES
"    [facility] Follow-UP Note    Reason for Follow-Up:  diarrhea     PCP:   Ashley Hairston       HPI:  This is a 63 y.o. female last seen in GI clinic on 6/2023 for chronic diarrhea. She states she has had a sensitive stomach for years but seems to be more bothersome recently. Typically has around 4 BM per day which are mushy. Triggered by meals and typically has BM after eating. Does not have to get up from eating to have BM but does have urgency at times. No blood in stool, weight loss, constipation. She also has had more issues with belching and bloating. No reflux, dysphagia, hematemesis, early satiety. Recent blood work showed normal TSH. CBC showed MCV of 101 but otherwise normal. CMP unremarkable. US 2023 showed liver cyst and cholelithiasis. Last cscope from 2018 showed diverticulosis but no polyps. Recommended repeat in 10 years. Denied NSAID use. No Fh of CRC, gastric cancer, celiac or IBD.  No recent medication changes, travel or viral illnesses. Takes imodium if she needs to avoid diarrhea which does help.      At time of last visit, blood work including celiac serologies were normal. Stool calpro and elastase were wnl. She was improving on bentyl. Planned for GI nutrition appt but seems patient missed the appt which was made for July.     Interval History:  Since last visit, she has been using the bentyl which she finds helpful. Last week, she didn't use bentyl and stools were looser. Not a lot a pain. When she does take is the stools are more firmed. She has not started fiber due to concern it might make her constipated. Otherwise she is doing well. States she missed the GI nut appt due to a skin cancer diagnosis which as been treated now. She is planning a trip to [place] next month and needs bentyl refilled.           Objective Findings:    Vital Signs:  /76   Pulse 71   Ht 5' 3" (1.6 m)   Wt 52.1 kg (114 lb 13.8 oz)   LMP 07/01/2015   BMI 20.35 kg/m²   Body mass " index is 20.35 kg/m².    Physical Exam:  General Appearance: Well appearing in no acute distress  Head:   Normocephalic, without obvious abnormality  Eyes:    No scleral icterus    Lungs: CTA bilaterally in anterior and posterior fields   Heart:  Regular rate and rhythm, no murmurs heard  Abdomen: Soft, non tender, non distended with positive bowel sounds in all four quadrants.      Imaging:     US 2023      FINDINGS:  Pancreas demonstrates no obvious abnormality in its visualized portion, it is incompletely displayed.  The hepatic confluence demonstrates no obvious abnormality in its visualized portions.  A cyst is noted within the left lobe of the liver of 8 mm.  The gallbladder demonstrates multiple echogenicities within it suggestive of stones the largest of approximately 7 mm.  No evidence of gallbladder wall thickening, pericholecystic fluid, or reported sonographic Hinds sign is noted.  Gallbladder is not 25-50% filled with stones.  A common bile duct of 3 mm is noted within normal limits.  The inferior vena cava appears patent at the level of the liver.  The liver measures 14.9 cm and demonstrates near normal echotexture.  The main portal vein appears patent.  The right kidney measures 10.0 cm and demonstrates no evidence of obstruction.     Impression:     No significant sonographic abnormality.     Endoscopy:       Cscope 2018        Multiple small and large-mouthed diverticula were found from sigmoid        to transverse colon.        The exam was otherwise without abnormality.      Assessment:     IBS-D   Diverticular disease      Patient with a few years of worsening post prandial diarrhea. No alarm features. UTD on cscope. Normal celiac serologies, CRP as well as calprotectin and fecal elastase. Continue bentyl as this is working well. Start fiber. Future therapies for persistent symptoms include elavil or xifaxan. She will let me know when she is back from vacation and we will plan nutrition visit  appt      Recommendations:       - Start fiber supplement   - Bentyl PRN for cramping   - GI nutrition for low FODMAP after she returns from vacation   - Consider xifaxin or elavil for IBS-D      RTC 3-6 months       Order summary:         Thank you so much for allowing me to participate in the care of Ashley Holt MD  Gastroenterology   [facility]

## 2023-09-27 ENCOUNTER — PATIENT MESSAGE (OUTPATIENT)
Dept: PRIMARY CARE CLINIC | Facility: CLINIC | Age: 63
End: 2023-09-27
Payer: COMMERCIAL

## 2023-09-29 ENCOUNTER — PATIENT MESSAGE (OUTPATIENT)
Dept: PRIMARY CARE CLINIC | Facility: CLINIC | Age: 63
End: 2023-09-29
Payer: COMMERCIAL

## 2023-11-21 ENCOUNTER — HOSPITAL ENCOUNTER (OUTPATIENT)
Dept: RADIOLOGY | Facility: HOSPITAL | Age: 63
Discharge: HOME | End: 2023-11-21
Attending: INTERNAL MEDICINE
Payer: COMMERCIAL

## 2023-11-21 DIAGNOSIS — Z12.31 VISIT FOR SCREENING MAMMOGRAM: ICD-10-CM

## 2023-11-21 PROCEDURE — 77063 BREAST TOMOSYNTHESIS BI: CPT | Mod: 26,,, | Performed by: RADIOLOGY

## 2023-11-21 PROCEDURE — 77063 MAMMO DIGITAL SCREENING BILAT WITH TOMO: ICD-10-PCS | Mod: 26,,, | Performed by: RADIOLOGY

## 2023-11-21 PROCEDURE — 77067 SCR MAMMO BI INCL CAD: CPT | Mod: 26,,, | Performed by: RADIOLOGY

## 2023-11-21 PROCEDURE — 77067 MAMMO DIGITAL SCREENING BILAT WITH TOMO: ICD-10-PCS | Mod: 26,,, | Performed by: RADIOLOGY

## 2023-11-21 PROCEDURE — 77067 SCR MAMMO BI INCL CAD: CPT | Mod: TC,PO

## 2023-11-21 NOTE — PROGRESS NOTES
I sent pt a my chart message -  I reviewed your Mammogram -   The breasts are heterogeneously dense, which may obscure small masses. There is no evidence of suspicious masses, microcalcifications or architectural distortion.  Retropectoral implants.   Impression:   No mammographic evidence of malignancy.  Routine screening mammogram in 1 year is recommended.  Dr. MOFFETT

## 2023-12-10 DIAGNOSIS — R19.7 DIARRHEA, UNSPECIFIED TYPE: ICD-10-CM

## 2023-12-11 RX ORDER — DICYCLOMINE HYDROCHLORIDE 10 MG/1
10 CAPSULE ORAL
Qty: 360 CAPSULE | Refills: 3 | Status: SHIPPED | OUTPATIENT
Start: 2023-12-11 | End: 2024-12-05

## 2023-12-20 ENCOUNTER — PATIENT MESSAGE (OUTPATIENT)
Dept: PAIN MEDICINE | Facility: CLINIC | Age: 63
End: 2023-12-20
Payer: COMMERCIAL

## 2023-12-21 RX ORDER — GABAPENTIN 300 MG/1
600 CAPSULE ORAL NIGHTLY
Qty: 180 CAPSULE | Refills: 3 | Status: SHIPPED | OUTPATIENT
Start: 2023-12-21 | End: 2024-02-01 | Stop reason: SDUPTHER

## 2024-02-01 ENCOUNTER — OFFICE VISIT (OUTPATIENT)
Dept: PAIN MEDICINE | Facility: CLINIC | Age: 64
End: 2024-02-01
Payer: COMMERCIAL

## 2024-02-01 VITALS
HEART RATE: 72 BPM | SYSTOLIC BLOOD PRESSURE: 119 MMHG | HEIGHT: 63 IN | DIASTOLIC BLOOD PRESSURE: 70 MMHG | BODY MASS INDEX: 20.13 KG/M2 | WEIGHT: 113.63 LBS

## 2024-02-01 DIAGNOSIS — M50.30 DDD (DEGENERATIVE DISC DISEASE), CERVICAL: Primary | ICD-10-CM

## 2024-02-01 PROCEDURE — 1160F RVW MEDS BY RX/DR IN RCRD: CPT | Mod: CPTII,S$GLB,, | Performed by: PHYSICIAN ASSISTANT

## 2024-02-01 PROCEDURE — 3078F DIAST BP <80 MM HG: CPT | Mod: CPTII,S$GLB,, | Performed by: PHYSICIAN ASSISTANT

## 2024-02-01 PROCEDURE — 3074F SYST BP LT 130 MM HG: CPT | Mod: CPTII,S$GLB,, | Performed by: PHYSICIAN ASSISTANT

## 2024-02-01 PROCEDURE — 99213 OFFICE O/P EST LOW 20 MIN: CPT | Mod: S$GLB,,, | Performed by: PHYSICIAN ASSISTANT

## 2024-02-01 PROCEDURE — 3008F BODY MASS INDEX DOCD: CPT | Mod: CPTII,S$GLB,, | Performed by: PHYSICIAN ASSISTANT

## 2024-02-01 PROCEDURE — 99999 PR PBB SHADOW E&M-EST. PATIENT-LVL IV: CPT | Mod: PBBFAC,,, | Performed by: PHYSICIAN ASSISTANT

## 2024-02-01 PROCEDURE — 1159F MED LIST DOCD IN RCRD: CPT | Mod: CPTII,S$GLB,, | Performed by: PHYSICIAN ASSISTANT

## 2024-02-01 RX ORDER — GABAPENTIN 300 MG/1
600 CAPSULE ORAL NIGHTLY
Qty: 180 CAPSULE | Refills: 3 | Status: SHIPPED | OUTPATIENT
Start: 2024-02-01

## 2024-02-11 NOTE — PROGRESS NOTES
"This note was completed with dictation software and grammatical errors may exist.    CC:Neck pain    HPI: The patient is a 63-year-old woman with no major past medical history who presents in referral from the Dr. Kathy Melton for neck pain, right shoulder pain.  She returns in follow-up today with neck pain.  She states that is long as she takes her gabapentin this is more of a soreness and not necessarily pain.  She does have some increased symptoms with lifting or holding her grandchildren but otherwise she is doing well and has no major complaints.  She denies weakness or numbness, denies bladder or bowel incontinence.    Pain intervention history:   She is status post C7-T1 cervical interlaminar epidural steroid injection on 9/21/15 with 15% relief.  She is status post C7-T1 cervical interlaminar epidural steroid injection on 10/26/15 with 75% relief. She is status post C7-T1 cervical interlaminar epidural steroid injection on 02/11/2019 with over 90% relief.    Spine surgeries:    Antineuropathics:  Gabapentin 600 mg nightly  NSAIDs:  Physical therapy:She completed physical therapy at the [facility] with moderate relief.   Antidepressants:  Lexapro  Muscle relaxers:  Opioids:  Antiplatelets/Anticoagulants:    ROS: She reports difficulty sleeping.  Balance of review of systems is negative.    Medical, surgical, family and social history reviewed elsewhere in record.    Medications/Allergies: See med card    Vitals:    02/01/24 1124   BP: 119/70   Pulse: 72   Weight: 51.5 kg (113 lb 10.4 oz)   Height: 5' 3" (1.6 m)   PainSc: 0-No pain   PainLoc: Neck         2/1/2024    11:26 AM 1/26/2022     3:09 PM 3/12/2019     8:03 AM   Last 3 PDI Scores   Pain Disability Index (PDI) 2 0 0         Physical exam:  Gen: A and O x3, pleasant, well-groomed  Skin: No rashes or obvious lesions  HEENT: PERRLA, no obvious deformities on ears or in canals.  CVS: Regular rate and rhythm, normal S1 and S2, no murmurs.  Resp: " Clear to auscultation bilaterally, no wheezes or rales.  Abdomen: Soft, NT/ND, normal bowel sounds present.  Musculoskeletal: No antalgic gait.      Neuro:  Upper extremities: 5/5 strength bilaterally   Reflexes: Brachioradialis 2+, Bicep 2+, Tricep 2+  Sensory: Intact and symmetrical to light touch and pinprick in C2-T1 dermatomes bilaterally    Cervical Spine:  Cervical spine: Range of motion is full with flexion and extension and lateral rotation without significant increased pain.  Myofascial exam:  No tenderness to palpation to the cervical paraspinous muscles.    Imagin/2/15 MRI C-spine  The cervical spinal cord is normal in signal and contour. No abnormal intrathecal enhancement.  C2/C3:No significant disc bulge, central canal or neural foraminal stenosis.  C3/C4: No significant disk bulge, central canal or neural foraminal stenosis.  C4/C5: Trace disk bulge with a pituitary hypertrophy facet joint arthropathy with out significant center canal stenosis with mild/moderate left and mild right neural foraminal stenosis.  C5/C6: Posterior disk osteophyte with uncovertebral joint or bridging facet arthropathy with mild center canal and moderate bilateral foraminal stenosis.  C6/C7: Posterior disk osteophyte with uncovertebral joint hypertrophy and facet joint arthropathy with mild/moderate central canal stenosis and moderate bilateral neural foraminal stenosis.   C7/T1: No significant disc bulge, central canal or neural foraminal stenosis.     Assessment:  The patient is a 63-year-old woman with no major past medical history who presents in referral from the Dr. Kathy Melton for neck pain, right shoulder pain.  1. DDD (degenerative disc disease), cervical              Plan:  1. I refilled gabapentin 600 mg nightly.    2. Follow-up in 1 year or sooner as needed.

## 2024-02-27 ENCOUNTER — OFFICE VISIT (OUTPATIENT)
Dept: OBSTETRICS AND GYNECOLOGY | Facility: CLINIC | Age: 64
End: 2024-02-27
Payer: COMMERCIAL

## 2024-02-27 VITALS
WEIGHT: 114.88 LBS | BODY MASS INDEX: 20.36 KG/M2 | SYSTOLIC BLOOD PRESSURE: 112 MMHG | DIASTOLIC BLOOD PRESSURE: 72 MMHG | HEIGHT: 63 IN

## 2024-02-27 DIAGNOSIS — Z01.419 ENCOUNTER FOR GYNECOLOGICAL EXAMINATION (GENERAL) (ROUTINE) WITHOUT ABNORMAL FINDINGS: Primary | ICD-10-CM

## 2024-02-27 DIAGNOSIS — Z79.890 HORMONE REPLACEMENT THERAPY (HRT): ICD-10-CM

## 2024-02-27 DIAGNOSIS — M85.80 OSTEOPENIA, UNSPECIFIED LOCATION: ICD-10-CM

## 2024-02-27 DIAGNOSIS — Z12.4 CERVICAL CANCER SCREENING: ICD-10-CM

## 2024-02-27 PROCEDURE — 3078F DIAST BP <80 MM HG: CPT | Mod: CPTII,S$GLB,, | Performed by: OBSTETRICS & GYNECOLOGY

## 2024-02-27 PROCEDURE — 1159F MED LIST DOCD IN RCRD: CPT | Mod: CPTII,S$GLB,, | Performed by: OBSTETRICS & GYNECOLOGY

## 2024-02-27 PROCEDURE — 3008F BODY MASS INDEX DOCD: CPT | Mod: CPTII,S$GLB,, | Performed by: OBSTETRICS & GYNECOLOGY

## 2024-02-27 PROCEDURE — 99396 PREV VISIT EST AGE 40-64: CPT | Mod: S$GLB,,, | Performed by: OBSTETRICS & GYNECOLOGY

## 2024-02-27 PROCEDURE — 87624 HPV HI-RISK TYP POOLED RSLT: CPT | Performed by: OBSTETRICS & GYNECOLOGY

## 2024-02-27 PROCEDURE — 88175 CYTOPATH C/V AUTO FLUID REDO: CPT | Performed by: OBSTETRICS & GYNECOLOGY

## 2024-02-27 PROCEDURE — 3074F SYST BP LT 130 MM HG: CPT | Mod: CPTII,S$GLB,, | Performed by: OBSTETRICS & GYNECOLOGY

## 2024-02-27 PROCEDURE — 99999 PR PBB SHADOW E&M-EST. PATIENT-LVL III: CPT | Mod: PBBFAC,,, | Performed by: OBSTETRICS & GYNECOLOGY

## 2024-02-27 RX ORDER — NORETHINDRONE 5 MG/1
5 TABLET ORAL DAILY
Qty: 90 TABLET | Refills: 3 | Status: SHIPPED | OUTPATIENT
Start: 2024-02-27 | End: 2025-02-26

## 2024-02-27 RX ORDER — ESTRADIOL 1 MG/1
1 TABLET ORAL DAILY
Qty: 90 TABLET | Refills: 3 | Status: SHIPPED | OUTPATIENT
Start: 2024-02-27 | End: 2025-02-26

## 2024-02-27 NOTE — PROGRESS NOTES
History and Physical:  Ashley Donnelly is a 63 y.o. F who presents today for her routine annual GYN exam. The patient has  no Gynecology complaints.    Annual:   Patient's last menstrual period was 07/01/2015.  Menopause: 56yr  Last Pap: 2019 NLIM/HPV neg; next 5 years 2/2024  History of abnormal pap: 37yr ago, Cryo, normal since then.   Last Mammogram: 11/2023 BIRADS 1; Tyrer-Cuzick risk 4%; next 11/2024  Colonosocpy: 4/2018, repeat 10 years; next 4/2028  DEXA: 4/2022 Osteopenia; next 4/2024  PCP: Ashley Hairston MD orders routine labs 4/2023  Immunization status: up to date and documented. Status post Zoster     Desires refill of HRT, declines decreasing dose or stopping. HRT for vasomotor symptoms and prevention of osteoporosis.     Allergies:   Review of patient's allergies indicates:   Allergen Reactions    Compazine [prochlorperazine edisylate] Other (See Comments)     Facial drooping     Past Medical History:   Diagnosis Date    Abnormal Pap smear of cervix     Anxiety     Arthritis     Basal cell carcinoma 11/2014    anterior chest    COVID-19     12/19/20 and possibly Dec. 2021    Herpes simplex     fever blisters    History of abnormal cervical Pap smear     Hormone replacement therapy (HRT)     Malignant melanoma of skin, unspecified     right arm    PONV (postoperative nausea and vomiting)     Seasonal allergies      Past Surgical History:   Procedure Laterality Date    AUGMENTATION OF BREAST      BREAST SURGERY      Breast Augmentation, X2    COLONOSCOPY N/A 04/26/2018    Procedure: COLONOSCOPY;  Surgeon: Mariano Kapoor MD;  Location: [address]);  Service: Endoscopy;  Laterality: N/A;    EPIDURAL STEROID INJECTION INTO CERVICAL SPINE N/A 02/11/2019    Procedure: Injection-steroid-epidural-cervical;  Surgeon: Liam Morgan MD;  Location: [facility] OR;  Service: Pain Management;  Laterality: N/A;    EXCISION OF MELANOMA      NECK MASS EXCISION Right 1998    benign    SKIN CANCER EXCISION N/A  2014    anterior chest for basal cell     SKIN CANCER EXCISION  2017    BCC of scalp     MEDS:   Current Outpatient Medications on File Prior to Visit   Medication Sig Dispense Refill    acyclovir (ZOVIRAX) 200 MG capsule Take 2 capsules (400 mg total) by mouth 3 (three) times daily. 60 capsule 0    biotin 5 mg Cap Take 1 capsule by mouth once daily.      CALCIUM ORAL Take 1,000 mg by mouth once daily.      docosahexaenoic acid/epa (FISH OIL ORAL) Take by mouth.      EScitalopram oxalate (LEXAPRO) 10 MG tablet TAKE 1 TABLET BY MOUTH EVERY DAY 90 tablet 3    estradioL (ESTRACE) 1 MG tablet Take 1 tablet (1 mg total) by mouth once daily. 60 tablet 0    fluocinonide (LIDEX) 0.05 % external solution Apply topically.      fluticasone (FLONASE) 50 mcg/actuation nasal spray 2 sprays by Each Nare route once daily.  6    gabapentin (NEURONTIN) 300 MG capsule Take 2 capsules (600 mg total) by mouth every evening. 180 capsule 3    multivitamin capsule Take 1 capsule by mouth once daily.      norethindrone (AYGESTIN) 5 mg Tab Take 1 tablet (5 mg total) by mouth once daily. 60 tablet 0    ondansetron (ZOFRAN) 4 MG tablet Take 1 tablet (4 mg total) by mouth every 8 (eight) hours as needed for Nausea. 30 tablet 1    triamcinolone acetonide 0.1% (KENALOG) 0.1 % cream APPLY SPARINGLY TO AFFECTED AREA TWICE A DAY      VITAMIN B COMPLEX NO.12-NIACIN ORAL Take 1 tablet by mouth once daily.      dicyclomine (BENTYL) 10 MG capsule TAKE 1 CAPSULE (10 MG TOTAL) BY MOUTH BEFORE MEALS AND AT BEDTIME AS NEEDED (CRAMPING). (Patient not taking: Reported on 2024) 360 capsule 3     No current facility-administered medications on file prior to visit.     OB History          1    Para   1    Term   1            AB        Living             SAB        IAB        Ectopic        Multiple        Live Births                   Social History     Socioeconomic History    Marital status:    Occupational History    Occupation:     Tobacco Use    Smoking status: Never    Smokeless tobacco: Never   Substance and Sexual Activity    Alcohol use: Yes     Comment: 2 gibran per month    Drug use: No    Sexual activity: Yes     Partners: Male     Birth control/protection: None, Post-menopausal   Social History Narrative    , 2 Grandchildren, Exercises, elliptical body pump, looking after her dad     Social Determinants of Health     Financial Resource Strain: Low Risk  (4/3/2023)    Overall Financial Resource Strain (CARDIA)     Difficulty of Paying Living Expenses: Not hard at all   Food Insecurity: No Food Insecurity (4/3/2023)    Hunger Vital Sign     Worried About Running Out of Food in the Last Year: Never true     Ran Out of Food in the Last Year: Never true   Transportation Needs: No Transportation Needs (4/3/2023)    PRAPARE - Transportation     Lack of Transportation (Medical): No     Lack of Transportation (Non-Medical): No   Physical Activity: Sufficiently Active (4/3/2023)    Exercise Vital Sign     Days of Exercise per Week: 4 days     Minutes of Exercise per Session: 60 min   Stress: No Stress Concern Present (4/3/2023)    [employer] - Occupational Stress Questionnaire     Feeling of Stress : Only a little   Social Connections: Unknown (4/3/2023)    Social Connection and Isolation Panel [NHANES]     Frequency of Communication with Friends and Family: Three times a week     Frequency of Social Gatherings with Friends and Family: Twice a week     Active Member of Clubs or Organizations: Yes     Attends Club or Organization Meetings: 1 to 4 times per year     Marital Status:    Housing Stability: Low Risk  (4/3/2023)    Housing Stability Vital Sign     Unable to Pay for Housing in the Last Year: No     Number of Places Lived in the Last Year: 1     Unstable Housing in the Last Year: No     Family History   Problem Relation Age of Onset    Heart disease Mother         chf  "   Hypertension Mother     Kidney disease Mother     Hyperlipidemia Mother     Cataracts Mother     Heart disease Father         afib    Hypertension Father     Anemia Father     Cataracts Father     No Known Problems Sister     Heart disease Brother         CAD    No Known Problems Brother     No Known Problems Brother     Colon cancer Maternal Grandmother     No Known Problems Daughter     Breast cancer Neg Hx     Ovarian cancer Neg Hx     Amblyopia Neg Hx     Blindness Neg Hx     Diabetes Neg Hx     Glaucoma Neg Hx     Macular degeneration Neg Hx     Retinal detachment Neg Hx     Strabismus Neg Hx     Stroke Neg Hx     Thyroid disease Neg Hx        Past medical and surgical history reviewed.   I have reviewed the patient's medical history in detail and updated the computerized patient record.    Review of System:   General: no chills, fever, night sweats, weight gain or weight loss  Breasts: no new or changing breast lumps, nipple discharge or masses.  Gastrointestinal: no abdominal pain, change in bowel habits, or black or bloody stools  Genito-Urinary: no incontinence, urinary frequency/urgency or vulvar/vaginal symptoms, pelvic pain or abnormal vaginal bleeding.    Physical Exam:   /72   Ht 5' 3" (1.6 m)   Wt 52.1 kg (114 lb 13.8 oz)   LMP 07/01/2015   BMI 20.35 kg/m²   Body mass index is 20.35 kg/m².  Constitutional: She appears alert and responsive. She appears well-developed, well-groomed, and well-nourished. No distress.  Breasts: are symmetrical.  Right breast exhibits no inverted nipple, no mass, no nipple discharge, no skin change and no tenderness.  Left breast exhibits no inverted nipple, no mass, no nipple discharge, no skin change and no tenderness.  Abdominal: Soft. She exhibits no distension, hernias or masses. There is no tenderness. No enlargement of liver edge or spleen.  There is   Genitourinary:    External rectal exam shows no thrombosed external hemorrhoids, no lesions.     Pelvic " exam was performed with patient supine.   No labial fusion, and symmetrical.    There is no rash, lesion or injury on the right labia.   There is no rash, lesion or injury on the left labia.   No bleeding and no signs of injury around the vaginal introitus, urethral meatus is normal size and without prolapse or lesions, urethra well supported. The cervix is visualized with no discharge, lesions or friability.   No vaginal discharge found.    No significant Cystocele, Enterocele or rectocele, and cervix and uterus well supported.   Bimanual exam:   The urethra is normal to palpation and there are no palpable vaginal wall masses.   Uterus is not deviated, not enlarged, not fixed, normal shape and not tender.   Cervix exhibits no motion tenderness.    Right adnexum displays no mass or nodularity and no tenderness.   Left adnexum displays no mass or nodularity and no tenderness.      Assessment/Plan:   Encounter for gynecological examination (general) (routine) without abnormal findings    Cervical cancer screening    Hormone replacement therapy (HRT)  -     estradioL (ESTRACE) 1 MG tablet; Take 1 tablet (1 mg total) by mouth once daily.  Dispense: 90 tablet; Refill: 3  -     norethindrone (AYGESTIN) 5 mg Tab; Take 1 tablet (5 mg total) by mouth once daily.  Dispense: 90 tablet; Refill: 3    Osteopenia, unspecified location  -     DXA Bone Density Axial Skeleton 1 or more sites; Future; Expected date: 02/27/2024          Follow up in 1 year.

## 2024-03-04 LAB
FINAL PATHOLOGIC DIAGNOSIS: NORMAL
Lab: NORMAL

## 2024-03-08 LAB
HPV HR 12 DNA SPEC QL NAA+PROBE: NEGATIVE
HPV16 AG SPEC QL: NEGATIVE
HPV18 DNA SPEC QL NAA+PROBE: NEGATIVE

## 2024-03-17 NOTE — PROGRESS NOTES
"[facility] Note    Chief Complaint      Chief Complaint   Patient presents with    Annual Exam       History of Present Illness      Ashley Donnelly is a 63 y.o.  WF with HSV, Basal call carcinoma, Postmenopausal HRT, Osteopenia,  DDD cervical spine,  Bulging cervial disk, Cervical Radiculopathy presents to fu for well visit. Last visit - 4/6/23     Osteopenia - DEXA - 4/27/22-showed osteopenia.  Rec OTC Calcium max 1000 mg/d and Vit D3 7860-0851 units/d OTC.  In addition, I rec weight-bearing . Rec exercise at least 30 minutes 3 times/wk and ideally 5 times/wk.  No specific intensity.  Walking is fine. I just rec  a form of weight-bearing exercise pt enjoys to facilitate long term compliance and benefit. Planning for repeat DEXA.      Chronic diarrhea - +flatulence. Probiotic not helpful. "I take imodium a lot". Belching. No RUQ pain. "Even as a little girl I had diarrhea and vomiting".   Rec adeq hydration.  Abd U/S - 4/28/23 -  showed a small 8 mm liver cyst and multiple gallstones.  She saw Gen Sx, Dr. Brandon, who felt this was asymptomatic cholelithiasis and symptoms c/w irritable bowel syndrome and was rec to fu with GI.  Could consider HIDA though I suspect yield is low. Discussed s/s of biliary colic or symptomatic cholelithiasis, for which she should return for re-eval.  She   fu with GI,  Dr. Holt. Celiac serologies were normal. Stool calpro and elastase were wnl.  Bentyl no help per pt.  GI nutrition for low FODMAP. Consider xifaxin or elavil for IBS-D. Due for GI fu.       HSV - Will refill Valtrex prn.  Trigger - sun.      Basal cell Ca -  Fu by Derm Q 3 mos.     Cervical Radiculopathy - Pt on gabapentin 600 mg QHS.  Fu by Dr. Morgan, Pain Mgmnt.      Anxiety - Pt doing good on Lexapro 10 mg/d.     Allergic rhinitis - Pt takes Claritin prn and Flonase prn.       Left upper lid ptosis - Does not bother line of vision.  Could consider fu with Dr. Mondragon or Dr. Montenegro - plastics.  I again " rec see Gen Ophtho for eye exam since not seen in > 2 yrs.     Snoring - Reports fatigue.  Scored 2 on ESS - nl.  No Witnessed or known apnea. Cont to monitor. Sleeps 8-9 hrs and sometimes awakens.     H/o Malignant Melanoma - Fu by Derm Q 3 mos.  S/p resection to lesion on Rt arm.  Doing well.       Postmenopausal HRT - Pt on Norethindrone and Estrace. Managed by OB/GYN.     HCM - Flu - 9/20/23;  RSV  -12/4/23;  Tdap - 5/4/15;  PCV 13 - none;  PVX 23 - none;  Shingrix - 7/20/20 and 3 2 - 9/28/20;  COVID -19 Vaccine (Moderna) - # 1 - 2/25/21; # 3/25/21; # 3 - 11/17/21; # 4 4/5/22; # 5 9/17/22; # 6 9/25/23 MGM -11/21/23 - repeat 1 yr;  DEXA - 4/27/22 - Osteopenia- scheduled;  PAP - 2/27/24 - neg; Hep C Screen - 3/19/18 - neg;  HIV Screen - ?; C-scope - 4/26/18 -Diverticulosis - repeat 10 yrs;  Prev PCP - Dr. Espinoza; Pain Mgmnt - Dr. Morgan; Ob/GYN - Dr. De Oliveira; Derm - Dr. Morley; Ophtho - ?    Patient Care Team:  Ashley Hairston MD as PCP - General (Internal Medicine)  Luann Meehan MA as Care Coordinator     Health Maintenance:  Immunization History   Administered Date(s) Administered    COVID-19 MRNA, LN-S PF (MODERNA HALF 0.25 ML DOSE) 04/05/2022    COVID-19, MRNA, LN-S, PF (MODERNA FULL 0.5 ML DOSE) 02/25/2021, 03/25/2021, 11/17/2021, 04/05/2022    COVID-19, mRNA, LNP-S, PF (Moderna 2023)Ages 12+ 09/25/2023    COVID-19, mRNA, LNP-S, bivalent booster, PF (Moderna Omicron)12 + YEARS 09/17/2022    Influenza - Quadrivalent - MDCK - PF 10/24/2022, 09/20/2023    Influenza - Quadrivalent - PF *Preferred* (6 months and older) 09/11/2018, 10/17/2019, 09/14/2020, 09/15/2021    RSVpreF (Arexvy) 12/04/2023    Tdap 05/04/2015    Zoster Recombinant 07/20/2020, 09/28/2020      Health Maintenance   Topic Date Due    DEXA Scan  04/27/2024    Mammogram  11/21/2024    TETANUS VACCINE  05/04/2025    Colorectal Cancer Screening  04/26/2028    Lipid Panel  04/27/2028    Hepatitis C Screening  Completed    Shingles  Vaccine  Completed        Past Medical History:  Past Medical History:   Diagnosis Date    Abnormal Pap smear of cervix     Anxiety     Arthritis     Basal cell carcinoma 11/2014    anterior chest    COVID-19     12/19/20 and possibly Dec. 2021    Herpes simplex     fever blisters    History of abnormal cervical Pap smear     Hormone replacement therapy (HRT)     Malignant melanoma of skin, unspecified     right arm    PONV (postoperative nausea and vomiting)     Seasonal allergies        Past Surgical History:   has a past surgical history that includes Neck mass excision (Right, 1998); Skin cancer excision (N/A, 11/2014); Breast surgery; Skin cancer excision (2017); Colonoscopy (N/A, 04/26/2018); Epidural steroid injection into cervical spine (N/A, 02/11/2019); Augmentation of breast; Excision of melanoma; and Cosmetic surgery (Breat Augmentation).    Family History:  family history includes Anemia in her father; Cancer in her maternal grandmother; Cataracts in her father and mother; Colon cancer in her maternal grandmother; Diabetes in her maternal grandfather; Heart disease in her brother, father, and mother; Hyperlipidemia in her mother; Hypertension in her father and mother; Kidney disease in her mother; No Known Problems in her brother, brother, daughter, and sister.     Social History:  Social History     Tobacco Use    Smoking status: Never    Smokeless tobacco: Never   Substance Use Topics    Alcohol use: Yes     Alcohol/week: 1.0 standard drink of alcohol     Types: 1 Glasses of wine per week     Comment: 2 gibran per month    Drug use: Never       Review of Systems   Constitutional:  Positive for fatigue. Negative for activity change.   HENT:  Negative for hearing loss and trouble swallowing.    Eyes:  Negative for discharge and visual disturbance.   Respiratory:  Negative for chest tightness and wheezing.    Cardiovascular:  Positive for palpitations. Negative for chest pain.        Once-twice per month  "has heart race for 15 seconds.  No change.  "Has been going on forever".  No Assoc  SOB, CP, or dizziness.    Gastrointestinal:  Positive for diarrhea. Negative for constipation and vomiting.        2 loose dark brown stools/d - takes immodium prn.    Endocrine: Negative for cold intolerance, heat intolerance and polydipsia.   Genitourinary:  Negative for difficulty urinating and hematuria.   Musculoskeletal:  Negative for arthralgias and myalgias.   Neurological:  Negative for headaches.   Psychiatric/Behavioral:  Negative for dysphoric mood. The patient is not nervous/anxious.         Stable.         Medications:    Current Outpatient Medications:     biotin 5 mg Cap, Take 1 capsule by mouth once daily., Disp: , Rfl:     CALCIUM ORAL, Take 1,000 mg by mouth once daily., Disp: , Rfl:     docosahexaenoic acid/epa (FISH OIL ORAL), Take by mouth., Disp: , Rfl:     estradioL (ESTRACE) 1 MG tablet, Take 1 tablet (1 mg total) by mouth once daily., Disp: 90 tablet, Rfl: 3    fluticasone (FLONASE) 50 mcg/actuation nasal spray, 2 sprays by Each Nare route once daily., Disp: , Rfl: 6    gabapentin (NEURONTIN) 300 MG capsule, Take 2 capsules (600 mg total) by mouth every evening., Disp: 180 capsule, Rfl: 3    multivitamin capsule, Take 1 capsule by mouth once daily., Disp: , Rfl:     norethindrone (AYGESTIN) 5 mg Tab, Take 1 tablet (5 mg total) by mouth once daily., Disp: 90 tablet, Rfl: 3    triamcinolone acetonide 0.1% (KENALOG) 0.1 % cream, APPLY SPARINGLY TO AFFECTED AREA TWICE A DAY, Disp: , Rfl:     VITAMIN B COMPLEX NO.12-NIACIN ORAL, Take 1 tablet by mouth once daily., Disp: , Rfl:     dicyclomine (BENTYL) 10 MG capsule, TAKE 1 CAPSULE (10 MG TOTAL) BY MOUTH BEFORE MEALS AND AT BEDTIME AS NEEDED (CRAMPING). (Patient not taking: Reported on 2/1/2024), Disp: 360 capsule, Rfl: 3    EScitalopram oxalate (LEXAPRO) 10 MG tablet, Take 1 tablet (10 mg total) by mouth once daily., Disp: 90 tablet, Rfl: 3    fluocinonide " "(LIDEX) 0.05 % external solution, Apply topically., Disp: , Rfl:     ondansetron (ZOFRAN) 4 MG tablet, Take 1 tablet (4 mg total) by mouth every 8 (eight) hours as needed for Nausea., Disp: 30 tablet, Rfl: 1    valACYclovir (VALTREX) 1000 MG tablet, 2 tabs po BID x 1 day prn flare, Disp: 30 tablet, Rfl: 2     Allergies:  Review of patient's allergies indicates:   Allergen Reactions    Compazine [prochlorperazine edisylate] Other (See Comments)     Facial drooping       Physical Exam      Vital Signs  Temp: 98 °F (36.7 °C)  Temp Source: Oral  Pulse: 71  SpO2: 95 %  BP: 112/72  BP Location: Right arm  Patient Position: Sitting  Pain Score: 0-No pain  Height and Weight  Height: 5' 3" (160 cm)  Weight: 52.6 kg (115 lb 15.4 oz)  BSA (Calculated - sq m): 1.53 sq meters  BMI (Calculated): 20.5  Weight in (lb) to have BMI = 25: 140.8      Patient Position: Sitting          3/19/2024    10:34 AM   EPWORTH SLEEPINESS SCALE   Sitting and reading 0   Watching TV 0   Sitting, inactive in a public place (e.g. a theatre or a meeting) 0   As a passenger in a car for an hour without a break 0   Lying down to rest in the afternoon when circumstances permit 2   Sitting and talking to someone 0   Sitting quietly after a lunch without alcohol 0   In a car, while stopped for a few minutes in traffic 0   Total score 2         Physical Exam  Vitals reviewed.   Constitutional:       General: She is not in acute distress.     Appearance: Normal appearance. She is not ill-appearing, toxic-appearing or diaphoretic.   HENT:      Head: Normocephalic and atraumatic.      Right Ear: Tympanic membrane normal.      Left Ear: Tympanic membrane normal.   Eyes:      Extraocular Movements: Extraocular movements intact.      Conjunctiva/sclera: Conjunctivae normal.      Pupils: Pupils are equal, round, and reactive to light.   Neck:      Vascular: No carotid bruit.   Cardiovascular:      Rate and Rhythm: Normal rate and regular rhythm.      Pulses: " Normal pulses.      Heart sounds: Normal heart sounds.   Pulmonary:      Effort: Pulmonary effort is normal. No respiratory distress.      Breath sounds: Normal breath sounds.   Abdominal:      General: Bowel sounds are normal. There is no distension.      Palpations: Abdomen is soft.      Tenderness: There is no abdominal tenderness. There is no guarding or rebound.   Musculoskeletal:      Cervical back: Neck supple. No tenderness.   Neurological:      General: No focal deficit present.      Mental Status: She is alert and oriented to person, place, and time.   Psychiatric:         Mood and Affect: Mood normal.         Behavior: Behavior normal.          Laboratory:  CBC:  Recent Labs   Lab 04/26/22  0836 04/27/23  0904   WBC 8.53 5.14   RBC 4.24 4.25   Hemoglobin 13.4 13.8   Hematocrit 41.1 43.0   Platelets 233 233   MCV 97 101 H   MCH 31.6 H 32.5 H   MCHC 32.6 32.1       CMP:  Recent Labs   Lab 04/26/22  0836 04/27/23  0904   Glucose 85 90   Calcium 9.1 9.1   Albumin 3.7 3.6   Total Protein 6.6 6.6   Sodium 137 136   Potassium 4.8 4.5   CO2 27 26   Chloride 103 103   BUN 24 H 20   Creatinine 0.7 0.7   Alkaline Phosphatase 49 L 34 L   ALT 17 13   AST 18 14   Total Bilirubin 0.4 0.3       LIPIDS:  Recent Labs   Lab 04/26/22  0836 04/27/23  0904   TSH 1.175 1.814   HDL 63 45   Cholesterol 171 146   Triglycerides 71 55   LDL Cholesterol 93.8 90.0   HDL/Cholesterol Ratio 36.8 30.8   Non-HDL Cholesterol 108 101   Total Cholesterol/HDL Ratio 2.7 3.2       TSH:  Recent Labs   Lab 04/26/22  0836 04/27/23  0904   TSH 1.175 1.814          Other:   Recent Labs   Lab 06/12/23  1502   Vitamin B-12 497           Assessment/Plan     Ashley Donnelly is a 63 y.o.female with:    Palpitations  - Check EKG.  NSR - artifact so can't see R waves well in V1-2. Awaits cards reading.     HSV infection  -     valACYclovir (VALTREX) 1000 MG tablet; 2 tabs po BID x 1 day prn flare  Dispense: 30 tablet; Refill: 2    Osteopenia, unspecified  location  - Stable.  Cont current regimen as above.  Repeat DEXA as sched.      Anxiety  -     EScitalopram oxalate (LEXAPRO) 10 MG tablet; Take 1 tablet (10 mg total) by mouth once daily.  Dispense: 90 tablet; Refill: 3  - Stable.  Cont current regimen.    Snoring  - D/w pt Sx's of sleep apnea.  ESS - wnl.  No witnessed apnea.  Check basic labs to r/o anemia, Thyroid d/o contributing to fatigue. Rec good sleep hygiene.     Irritable bowel syndrome, unspecified type   - Fu by GI. Due for fu. Takes immodium prn.     Calculus of gallbladder without cholecystitis without obstruction   - D/w pt sx's of Gallstones.     Screening mammogram, encounter for  -     Mammo Digital Screening Bilat w/ Goyo; Future; Expected date: 11/22/2024    Other orders  -     TSH; Future; Expected date: 04/28/2024  -     Comprehensive Metabolic Panel; Future; Expected date: 04/28/2024  -     CBC Auto Differential; Future; Expected date: 04/28/2024  -     IN OFFICE EKG 12-LEAD (to Muse)  -     Lipid Panel; Future; Expected date: 04/28/2024    Other orders  -     ondansetron (ZOFRAN) 4 MG tablet; Take 1 tablet (4 mg total) by mouth every 8 (eight) hours as needed for Nausea.  Dispense: 30 tablet; Refill: 1         Chronic conditions status updated as per HPI.  Other than changes above, cont current medications and maintain follow up with specialists.    Follow up in about 6 months (around 9/19/2024) for well visit or sooner if needed.      Ashley Hairston MD  [facility]

## 2024-03-19 ENCOUNTER — OFFICE VISIT (OUTPATIENT)
Dept: PRIMARY CARE CLINIC | Facility: CLINIC | Age: 64
End: 2024-03-19
Payer: COMMERCIAL

## 2024-03-19 VITALS
DIASTOLIC BLOOD PRESSURE: 72 MMHG | TEMPERATURE: 98 F | SYSTOLIC BLOOD PRESSURE: 112 MMHG | WEIGHT: 115.94 LBS | OXYGEN SATURATION: 95 % | BODY MASS INDEX: 20.54 KG/M2 | HEIGHT: 63 IN | HEART RATE: 71 BPM

## 2024-03-19 DIAGNOSIS — Z12.31 SCREENING MAMMOGRAM, ENCOUNTER FOR: ICD-10-CM

## 2024-03-19 DIAGNOSIS — R06.83 SNORING: ICD-10-CM

## 2024-03-19 DIAGNOSIS — F41.9 ANXIETY: ICD-10-CM

## 2024-03-19 DIAGNOSIS — Z00.00 NORMAL PHYSICAL EXAM, ROUTINE: ICD-10-CM

## 2024-03-19 DIAGNOSIS — Z13.220 SCREENING FOR LIPOID DISORDERS: ICD-10-CM

## 2024-03-19 DIAGNOSIS — K58.9 IRRITABLE BOWEL SYNDROME, UNSPECIFIED TYPE: ICD-10-CM

## 2024-03-19 DIAGNOSIS — M85.80 OSTEOPENIA, UNSPECIFIED LOCATION: ICD-10-CM

## 2024-03-19 DIAGNOSIS — K80.20 CALCULUS OF GALLBLADDER WITHOUT CHOLECYSTITIS WITHOUT OBSTRUCTION: ICD-10-CM

## 2024-03-19 DIAGNOSIS — R00.2 PALPITATIONS: Primary | ICD-10-CM

## 2024-03-19 DIAGNOSIS — B00.9 HSV INFECTION: ICD-10-CM

## 2024-03-19 LAB
OHS QRS DURATION: 72 MS
OHS QTC CALCULATION: 415 MS

## 2024-03-19 PROCEDURE — 3074F SYST BP LT 130 MM HG: CPT | Mod: CPTII,S$GLB,, | Performed by: INTERNAL MEDICINE

## 2024-03-19 PROCEDURE — 93010 ELECTROCARDIOGRAM REPORT: CPT | Mod: S$GLB,,, | Performed by: INTERNAL MEDICINE

## 2024-03-19 PROCEDURE — 3008F BODY MASS INDEX DOCD: CPT | Mod: CPTII,S$GLB,, | Performed by: INTERNAL MEDICINE

## 2024-03-19 PROCEDURE — 93005 ELECTROCARDIOGRAM TRACING: CPT | Mod: S$GLB,,, | Performed by: INTERNAL MEDICINE

## 2024-03-19 PROCEDURE — 1159F MED LIST DOCD IN RCRD: CPT | Mod: CPTII,S$GLB,, | Performed by: INTERNAL MEDICINE

## 2024-03-19 PROCEDURE — 3078F DIAST BP <80 MM HG: CPT | Mod: CPTII,S$GLB,, | Performed by: INTERNAL MEDICINE

## 2024-03-19 PROCEDURE — 99214 OFFICE O/P EST MOD 30 MIN: CPT | Mod: S$GLB,,, | Performed by: INTERNAL MEDICINE

## 2024-03-19 PROCEDURE — 99999 PR PBB SHADOW E&M-EST. PATIENT-LVL V: CPT | Mod: PBBFAC,,, | Performed by: INTERNAL MEDICINE

## 2024-03-19 PROCEDURE — 1160F RVW MEDS BY RX/DR IN RCRD: CPT | Mod: CPTII,S$GLB,, | Performed by: INTERNAL MEDICINE

## 2024-03-19 RX ORDER — VALACYCLOVIR HYDROCHLORIDE 1 G/1
TABLET, FILM COATED ORAL
Qty: 30 TABLET | Refills: 2 | Status: SHIPPED | OUTPATIENT
Start: 2024-03-19

## 2024-03-19 RX ORDER — ESCITALOPRAM OXALATE 10 MG/1
10 TABLET ORAL DAILY
Qty: 90 TABLET | Refills: 3 | Status: SHIPPED | OUTPATIENT
Start: 2024-03-19

## 2024-03-19 RX ORDER — ONDANSETRON 4 MG/1
4 TABLET, FILM COATED ORAL EVERY 8 HOURS PRN
Qty: 30 TABLET | Refills: 1 | Status: SHIPPED | OUTPATIENT
Start: 2024-03-19

## 2024-05-14 ENCOUNTER — LAB VISIT (OUTPATIENT)
Dept: LAB | Facility: HOSPITAL | Age: 64
End: 2024-05-14
Attending: INTERNAL MEDICINE
Payer: COMMERCIAL

## 2024-05-14 DIAGNOSIS — Z00.00 NORMAL PHYSICAL EXAM, ROUTINE: ICD-10-CM

## 2024-05-14 DIAGNOSIS — Z13.220 SCREENING FOR LIPOID DISORDERS: ICD-10-CM

## 2024-05-14 LAB
ALBUMIN SERPL BCP-MCNC: 3.7 G/DL (ref 3.5–5.2)
ALP SERPL-CCNC: 38 U/L (ref 55–135)
ALT SERPL W/O P-5'-P-CCNC: 13 U/L (ref 10–44)
ANION GAP SERPL CALC-SCNC: 10 MMOL/L (ref 8–16)
AST SERPL-CCNC: 15 U/L (ref 10–40)
BASOPHILS # BLD AUTO: 0.04 K/UL (ref 0–0.2)
BASOPHILS NFR BLD: 0.8 % (ref 0–1.9)
BILIRUB SERPL-MCNC: 0.5 MG/DL (ref 0.1–1)
BUN SERPL-MCNC: 22 MG/DL (ref 8–23)
CALCIUM SERPL-MCNC: 9.1 MG/DL (ref 8.7–10.5)
CHLORIDE SERPL-SCNC: 104 MMOL/L (ref 95–110)
CHOLEST SERPL-MCNC: 173 MG/DL (ref 120–199)
CHOLEST/HDLC SERPL: 3.8 {RATIO} (ref 2–5)
CO2 SERPL-SCNC: 22 MMOL/L (ref 23–29)
CREAT SERPL-MCNC: 0.8 MG/DL (ref 0.5–1.4)
DIFFERENTIAL METHOD BLD: ABNORMAL
EOSINOPHIL # BLD AUTO: 0.1 K/UL (ref 0–0.5)
EOSINOPHIL NFR BLD: 1.7 % (ref 0–8)
ERYTHROCYTE [DISTWIDTH] IN BLOOD BY AUTOMATED COUNT: 12.6 % (ref 11.5–14.5)
EST. GFR  (NO RACE VARIABLE): >60 ML/MIN/1.73 M^2
GLUCOSE SERPL-MCNC: 88 MG/DL (ref 70–110)
HCT VFR BLD AUTO: 41.3 % (ref 37–48.5)
HDLC SERPL-MCNC: 45 MG/DL (ref 40–75)
HDLC SERPL: 26 % (ref 20–50)
HGB BLD-MCNC: 14 G/DL (ref 12–16)
IMM GRANULOCYTES # BLD AUTO: 0.01 K/UL (ref 0–0.04)
IMM GRANULOCYTES NFR BLD AUTO: 0.2 % (ref 0–0.5)
LDLC SERPL CALC-MCNC: 106.4 MG/DL (ref 63–159)
LYMPHOCYTES # BLD AUTO: 1.8 K/UL (ref 1–4.8)
LYMPHOCYTES NFR BLD: 38.3 % (ref 18–48)
MCH RBC QN AUTO: 32.9 PG (ref 27–31)
MCHC RBC AUTO-ENTMCNC: 33.9 G/DL (ref 32–36)
MCV RBC AUTO: 97 FL (ref 82–98)
MONOCYTES # BLD AUTO: 0.4 K/UL (ref 0.3–1)
MONOCYTES NFR BLD: 7.9 % (ref 4–15)
NEUTROPHILS # BLD AUTO: 2.5 K/UL (ref 1.8–7.7)
NEUTROPHILS NFR BLD: 51.1 % (ref 38–73)
NONHDLC SERPL-MCNC: 128 MG/DL
NRBC BLD-RTO: 0 /100 WBC
PLATELET # BLD AUTO: 248 K/UL (ref 150–450)
PMV BLD AUTO: 11.1 FL (ref 9.2–12.9)
POTASSIUM SERPL-SCNC: 4.2 MMOL/L (ref 3.5–5.1)
PROT SERPL-MCNC: 6.8 G/DL (ref 6–8.4)
RBC # BLD AUTO: 4.25 M/UL (ref 4–5.4)
SODIUM SERPL-SCNC: 136 MMOL/L (ref 136–145)
TRIGL SERPL-MCNC: 108 MG/DL (ref 30–150)
TSH SERPL DL<=0.005 MIU/L-ACNC: 1.52 UIU/ML (ref 0.4–4)
WBC # BLD AUTO: 4.8 K/UL (ref 3.9–12.7)

## 2024-05-14 PROCEDURE — 85025 COMPLETE CBC W/AUTO DIFF WBC: CPT | Performed by: INTERNAL MEDICINE

## 2024-05-14 PROCEDURE — 80053 COMPREHEN METABOLIC PANEL: CPT | Performed by: INTERNAL MEDICINE

## 2024-05-14 PROCEDURE — 36415 COLL VENOUS BLD VENIPUNCTURE: CPT | Mod: PO | Performed by: INTERNAL MEDICINE

## 2024-05-14 PROCEDURE — 84443 ASSAY THYROID STIM HORMONE: CPT | Performed by: INTERNAL MEDICINE

## 2024-05-14 PROCEDURE — 80061 LIPID PANEL: CPT | Performed by: INTERNAL MEDICINE

## 2024-05-15 ENCOUNTER — HOSPITAL ENCOUNTER (OUTPATIENT)
Dept: RADIOLOGY | Facility: HOSPITAL | Age: 64
Discharge: HOME | End: 2024-05-15
Attending: OBSTETRICS & GYNECOLOGY
Payer: COMMERCIAL

## 2024-05-15 DIAGNOSIS — M85.80 OSTEOPENIA, UNSPECIFIED LOCATION: ICD-10-CM

## 2024-05-15 PROCEDURE — 77080 DXA BONE DENSITY AXIAL: CPT | Mod: 26,,, | Performed by: RADIOLOGY

## 2024-05-15 PROCEDURE — 77080 DXA BONE DENSITY AXIAL: CPT | Mod: TC,PO

## 2024-05-16 ENCOUNTER — TELEPHONE (OUTPATIENT)
Dept: OBSTETRICS AND GYNECOLOGY | Facility: CLINIC | Age: 64
End: 2024-05-16
Payer: COMMERCIAL

## 2024-05-16 ENCOUNTER — PATIENT MESSAGE (OUTPATIENT)
Dept: OBSTETRICS AND GYNECOLOGY | Facility: CLINIC | Age: 64
End: 2024-05-16
Payer: COMMERCIAL

## 2024-05-16 NOTE — TELEPHONE ENCOUNTER
Called pt to go over results and DR notes pt understood and wanted me to copy and paste to her mychart of the recommendations  wants her to do and I did. Pt understood

## 2024-05-16 NOTE — TELEPHONE ENCOUNTER
----- Message from Merna De Oliveira MD sent at 5/15/2024 12:59 PM CDT -----  Impression: Low bone mass (Osteopenia)    RECOMMENDATIONS:  #Daily calcium intake 0113-3029 mg, dietary sources preferred; Vitamin D 1917-6209 IU daily.  #Weight bearing exercise and fall precautions.  #Repeat BMD in 2 years.  #hormone replacement therapy can be taken to help prevent osteoporosis    Merna De Oliveira MD

## 2024-12-09 RX ORDER — GABAPENTIN 300 MG/1
600 CAPSULE ORAL NIGHTLY
Qty: 180 CAPSULE | Refills: 3 | Status: SHIPPED | OUTPATIENT
Start: 2024-12-09

## 2024-12-09 NOTE — TELEPHONE ENCOUNTER
Called pt as requested to offer an appt for a yearly check up   no answer left vm for a call back   Pt also requested refills for gabapentin 300mg

## 2025-01-30 ENCOUNTER — HOSPITAL ENCOUNTER (OUTPATIENT)
Dept: RADIOLOGY | Facility: HOSPITAL | Age: 65
Discharge: HOME | End: 2025-01-30
Attending: INTERNAL MEDICINE
Payer: COMMERCIAL

## 2025-01-30 DIAGNOSIS — Z12.31 SCREENING MAMMOGRAM, ENCOUNTER FOR: ICD-10-CM

## 2025-01-30 PROCEDURE — 77067 SCR MAMMO BI INCL CAD: CPT | Mod: TC,PO

## 2025-01-30 PROCEDURE — 77063 BREAST TOMOSYNTHESIS BI: CPT | Mod: 26,,, | Performed by: RADIOLOGY

## 2025-01-30 PROCEDURE — 77067 SCR MAMMO BI INCL CAD: CPT | Mod: 26,,, | Performed by: RADIOLOGY

## 2025-01-30 NOTE — PROGRESS NOTES
I sent pt a my chart message -  I reviewed your Mammogram -  The breasts are heterogeneously dense, which may obscure small masses. There is no evidence of suspicious masses, microcalcifications or architectural distortion.  Retropectoral implants in place.   Impression:   No mammographic evidence of malignancy.  Routine screening mammogram in 1 year is recommended.  Dr. MOFFETT

## 2025-02-04 ENCOUNTER — OFFICE VISIT (OUTPATIENT)
Dept: PAIN MEDICINE | Facility: CLINIC | Age: 65
End: 2025-02-04
Payer: COMMERCIAL

## 2025-02-04 VITALS
HEART RATE: 83 BPM | DIASTOLIC BLOOD PRESSURE: 71 MMHG | BODY MASS INDEX: 21.3 KG/M2 | SYSTOLIC BLOOD PRESSURE: 124 MMHG | WEIGHT: 120.25 LBS | HEIGHT: 63 IN

## 2025-02-04 DIAGNOSIS — M50.30 DDD (DEGENERATIVE DISC DISEASE), CERVICAL: Primary | ICD-10-CM

## 2025-02-04 PROCEDURE — 3074F SYST BP LT 130 MM HG: CPT | Mod: CPTII,S$GLB,, | Performed by: PHYSICIAN ASSISTANT

## 2025-02-04 PROCEDURE — 1160F RVW MEDS BY RX/DR IN RCRD: CPT | Mod: CPTII,S$GLB,, | Performed by: PHYSICIAN ASSISTANT

## 2025-02-04 PROCEDURE — 3008F BODY MASS INDEX DOCD: CPT | Mod: CPTII,S$GLB,, | Performed by: PHYSICIAN ASSISTANT

## 2025-02-04 PROCEDURE — 1101F PT FALLS ASSESS-DOCD LE1/YR: CPT | Mod: CPTII,S$GLB,, | Performed by: PHYSICIAN ASSISTANT

## 2025-02-04 PROCEDURE — 3288F FALL RISK ASSESSMENT DOCD: CPT | Mod: CPTII,S$GLB,, | Performed by: PHYSICIAN ASSISTANT

## 2025-02-04 PROCEDURE — 99999 PR PBB SHADOW E&M-EST. PATIENT-LVL IV: CPT | Mod: PBBFAC,,, | Performed by: PHYSICIAN ASSISTANT

## 2025-02-04 PROCEDURE — 1159F MED LIST DOCD IN RCRD: CPT | Mod: CPTII,S$GLB,, | Performed by: PHYSICIAN ASSISTANT

## 2025-02-04 PROCEDURE — 3078F DIAST BP <80 MM HG: CPT | Mod: CPTII,S$GLB,, | Performed by: PHYSICIAN ASSISTANT

## 2025-02-04 PROCEDURE — 1126F AMNT PAIN NOTED NONE PRSNT: CPT | Mod: CPTII,S$GLB,, | Performed by: PHYSICIAN ASSISTANT

## 2025-02-04 PROCEDURE — 99213 OFFICE O/P EST LOW 20 MIN: CPT | Mod: S$GLB,,, | Performed by: PHYSICIAN ASSISTANT

## 2025-02-15 NOTE — PROGRESS NOTES
"This note was completed with dictation software and grammatical errors may exist.    CC:Neck pain    HPI: The patient is a 64-year-old woman with no major past medical history who presents in referral from the Dr. Kathy Melton for neck pain, right shoulder pain.  She returns in follow-up today for neck pain.  It has been a year since her last visit and overall she has been doing very well.  She denies any major pain, reports occasional discomfort.  She denies radicular symptoms, denies weakness or numbness, denies bladder or bowel incontinence.  She continues to take gabapentin with sufficient relief.    Pain intervention history:   She is status post C7-T1 cervical interlaminar epidural steroid injection on 9/21/15 with 15% relief.  She is status post C7-T1 cervical interlaminar epidural steroid injection on 10/26/15 with 75% relief. She is status post C7-T1 cervical interlaminar epidural steroid injection on 02/11/2019 with over 90% relief.    Spine surgeries:    Antineuropathics:  Gabapentin 600 mg nightly  NSAIDs:  Physical therapy:She completed physical therapy at the [facility] with moderate relief.   Antidepressants:  Lexapro  Muscle relaxers:  Opioids:  Antiplatelets/Anticoagulants:    ROS: She reports difficulty sleeping.  Balance of review of systems is negative.    Medical, surgical, family and social history reviewed elsewhere in record.    Medications/Allergies: See med card    Vitals:    02/04/25 1425   BP: 124/71   Pulse: 83   Weight: 54.5 kg (120 lb 4.2 oz)   Height: 5' 3" (1.6 m)   PainSc: 0-No pain   PainLoc: Neck         2/4/2025     2:23 PM 2/1/2024    11:26 AM 1/26/2022     3:09 PM   Last 3 PDI Scores   Pain Disability Index (PDI) 9 2 0         Physical exam:  Gen: A and O x3, pleasant, well-groomed  Skin: No rashes or obvious lesions  HEENT: PERRLA, no obvious deformities on ears or in canals.  CVS: Regular rate and rhythm, normal S1 and S2, no murmurs.  Resp: Clear to auscultation " bilaterally, no wheezes or rales.  Abdomen: Soft, NT/ND, normal bowel sounds present.  Musculoskeletal: No antalgic gait.      Neuro:  Upper extremities: 5/5 strength bilaterally   Reflexes: Brachioradialis 2+, Bicep 2+, Tricep 2+  Sensory: Intact and symmetrical to light touch and pinprick in C2-T1 dermatomes bilaterally    Cervical Spine:  Cervical spine: Range of motion is full with flexion and extension and lateral rotation without pain.  Myofascial exam:  No tenderness to palpation to the cervical paraspinous muscles.    Imagin/2/15 MRI C-spine  The cervical spinal cord is normal in signal and contour. No abnormal intrathecal enhancement.  C2/C3:No significant disc bulge, central canal or neural foraminal stenosis.  C3/C4: No significant disk bulge, central canal or neural foraminal stenosis.  C4/C5: Trace disk bulge with a pituitary hypertrophy facet joint arthropathy with out significant center canal stenosis with mild/moderate left and mild right neural foraminal stenosis.  C5/C6: Posterior disk osteophyte with uncovertebral joint or bridging facet arthropathy with mild center canal and moderate bilateral foraminal stenosis.  C6/C7: Posterior disk osteophyte with uncovertebral joint hypertrophy and facet joint arthropathy with mild/moderate central canal stenosis and moderate bilateral neural foraminal stenosis.   C7/T1: No significant disc bulge, central canal or neural foraminal stenosis.     Assessment:  The patient is a 64-year-old woman with no major past medical history who presents in referral from the Dr. Kathy Melton for neck pain, right shoulder pain.  1. DDD (degenerative disc disease), cervical              Plan:  1. She will continue gabapentin 600 mg nightly.    2. Follow-up in 1 year or sooner as needed.

## 2025-03-08 DIAGNOSIS — Z79.890 HORMONE REPLACEMENT THERAPY (HRT): ICD-10-CM

## 2025-03-08 RX ORDER — NORETHINDRONE 5 MG/1
5 TABLET ORAL
Qty: 90 TABLET | Refills: 0 | Status: SHIPPED | OUTPATIENT
Start: 2025-03-08

## 2025-03-08 NOTE — TELEPHONE ENCOUNTER
Refill Decision Note   Ashley Donnelly  is requesting a refill authorization.  Brief Assessment and Rationale for Refill:  Approve     Medication Therapy Plan:  FOV in 4 days. Dose override in place (Overridden by Merna De Oliveira MD on Feb 27, 2024). Acceptable to override per protocol      Alert overridden per protocol: Yes   Comments:     Note composed:7:43 AM 03/08/2025

## 2025-03-12 ENCOUNTER — PATIENT MESSAGE (OUTPATIENT)
Dept: OBSTETRICS AND GYNECOLOGY | Facility: CLINIC | Age: 65
End: 2025-03-12
Payer: COMMERCIAL

## 2025-03-12 DIAGNOSIS — F41.9 ANXIETY: ICD-10-CM

## 2025-03-12 DIAGNOSIS — Z79.890 HORMONE REPLACEMENT THERAPY (HRT): ICD-10-CM

## 2025-03-12 RX ORDER — ESTRADIOL 1 MG/1
1 TABLET ORAL DAILY
Qty: 30 TABLET | Refills: 0 | Status: SHIPPED | OUTPATIENT
Start: 2025-03-12 | End: 2025-04-11

## 2025-03-12 RX ORDER — ESCITALOPRAM OXALATE 10 MG/1
10 TABLET ORAL
Qty: 90 TABLET | Refills: 0 | Status: SHIPPED | OUTPATIENT
Start: 2025-03-12

## 2025-03-12 NOTE — TELEPHONE ENCOUNTER
Provider Staff:  Action required for this patient    Requires labs      Please see care gap opportunities below in Care Due Message.    Thanks!  [facility]     Appointments      Date Provider   Last Visit   3/19/2024 Ashley Hairston MD   Next Visit   4/1/2025 Ashley Hairston MD     Refill Decision Note   Ashley Donnelly  is requesting a refill authorization.  Brief Assessment and Rationale for Refill:  Approve     Medication Therapy Plan:  FOVS      Comments:     Note composed:11:39 AM 03/12/2025

## 2025-03-12 NOTE — TELEPHONE ENCOUNTER
Care Due:                  Date            Visit Type   Department     Provider  --------------------------------------------------------------------------------                                MYCHART                              ANNUAL                              CHECKUP/PHY  [facility]  Last Visit: 03-      S            CARE           Ashley  Giambrone                              MYCHART                              ANNUAL                              CHECKUP/PHY  [facility]  Next Visit: 04-      S            CARE           Ashley  Gikasirone                                                            Last  Test          Frequency    Reason                     Performed    Due Date  --------------------------------------------------------------------------------    CBC.........  12 months..  valACYclovir.............  05- 05-    Cr..........  12 months..  valACYclovir.............  05-   05-    [facility] Embedded Care Due Messages. Reference number: [id number].   3/12/2025 12:23:51 AM CDT

## 2025-03-30 NOTE — PROGRESS NOTES
"[facility] Note    Chief Complaint    No chief complaint on file.      History of Present Illness      Ashley Donnelly is a 64 y.o.    WF with HSV, Basal call carcinoma, Postmenopausal HRT, Osteopenia,  DDD cervical spine,  Bulging cervial disk, Cervical Radiculopathy presents to fu for well visit. Last visit - 3/19/24     Osteopenia - DEXA - 5/15/24-showed osteopenia.  Rec OTC Calcium max 1000 mg/d and Vit D3 0916-2314 units/d OTC.  In addition, I rec weight-bearing . Rec exercise at least 30 minutes 3 times/wk and ideally 5 times/wk.  No specific intensity.  Walking is fine. I just rec  a form of weight-bearing exercise pt enjoys to facilitate long term compliance and benefit. Planning for repeat DEXA.      Chronic diarrhea - +flatulence. Probiotic not helpful. "I take imodium a lot". Belching. No RUQ pain. "Even as a little girl I had diarrhea and vomiting".   Rec adeq hydration.  Abd U/S - 4/28/23 -  showed a small 8 mm liver cyst and multiple gallstones.  She saw Gen Sx, Dr. Brandon, who felt this was asymptomatic cholelithiasis and symptoms c/w irritable bowel syndrome and was rec to fu with GI.  Could consider HIDA though I suspect yield is low. Discussed s/s of biliary colic or symptomatic cholelithiasis, for which she should return for re-eval.  She   fu with GI,  Dr. Holt. Celiac serologies were normal. Stool calpro and elastase were wnl.  Bentyl no help per pt.  GI nutrition for low FODMAP. she tried xifaxin - no change.        HSV - Will refill Valtrex prn.  Trigger - sun.      Basal cell Ca -  Fu by Derm Q 3 mos.     Cervical Radiculopathy - Pt on gabapentin 600 mg QHS.  Fu by Dr. Morgan, Pain Mgmnt.      Anxiety - Pt doing good on Lexapro 10 mg/d. Could consider inc or adding Buspar or hydroxyzine if needed.      Allergic rhinitis - Pt takes Claritin prn and Flonase prn.       Left upper lid ptosis - Does not bother line of vision.  Could consider fu with Dr. Mondragon or Dr. Montenegro - " plastics.  I again rec see Gen Ophtho for eye exam since not seen in > 2 yrs.      Snoring - Reports fatigue.  Scored 2 on ESS - nl.  No Witnessed or known apnea. Cont to monitor. Sleeps 8-9 hrs and sometimes awakens.      H/o Malignant Melanoma - Fu by Derm Q 3 mos.  S/p resection to lesion on Rt arm.  Doing well.       Postmenopausal HRT - Pt on Norethindrone and Estrace. Managed by OB/GYN.     HCM - Flu -10/7/24;  RSV  -12/4/23;  Tdap - 5/4/15 -defers;  PCV 13 - none;  PVX 23 - none; PCV 20 - 10/7/24; Shingrix - 7/20/20 and 3 2 - 9/28/20;  COVID -19 Vaccine (Moderna) - # 1 - 2/25/21; # 3/25/21; # 3 - 11/17/21; # 4 4/5/22; # 5 9/17/22; # 6 9/25/23;  MGM - 1/30/25 - repeat 1 yr;  DEXA -5/15/24 - Osteopenia;  PAP - 2/27/24 - neg; Hep C Screen - 3/19/18 - neg;  HIV Screen - ?; C-scope - 4/26/18 -Diverticulosis - repeat 10 yrs;  Prev PCP - Dr. Espinoza; Pain Mgmnt - Dr. Morgan/DR. Sharp; Ob/GYN - Dr. De Oliveira; Derm - Dr. Morley; Ophtho - ?; Well visit - 4/1/25     Patient Care Team:  Ashley Hairston MD as PCP - General (Internal Medicine)     Health Maintenance:  Immunization History   Administered Date(s) Administered    COVID-19 MRNA, LN-S PF (MODERNA HALF 0.25 ML DOSE) 04/05/2022    COVID-19, MRNA, LN-S, PF (MODERNA FULL 0.5 ML DOSE) 02/25/2021, 03/25/2021, 11/17/2021, 04/05/2022    COVID-19, mRNA, LNP-S, bivalent booster, PF (Moderna Omicron)12 + YEARS 09/17/2022    Influenza - Quadrivalent - MDCK - PF 10/24/2022, 09/20/2023    Influenza - Quadrivalent - PF *Preferred* (6 months and older) 09/11/2018, 10/17/2019, 09/14/2020, 09/15/2021    RSVpreF (Arexvy) 12/04/2023    Tdap 05/04/2015    Zoster Recombinant 07/20/2020, 09/28/2020      Health Maintenance   Topic Date Due    HIV Screening  Never done    COVID-19 Vaccine (8 - 2024-25 season) 09/01/2024    TETANUS VACCINE  05/04/2025    Mammogram  01/30/2026    DEXA Scan  05/15/2026    Colorectal Cancer Screening  04/26/2028    Lipid Panel  05/14/2029     Hepatitis C Screening  Completed    Shingles Vaccine  Completed    Influenza Vaccine  Completed    RSV Vaccine (Age 60+ and Pregnant patients)  Completed    Pneumococcal Vaccines (Age 50+)  Completed        Past Medical History:  Past Medical History:   Diagnosis Date    Abnormal Pap smear of cervix     Anxiety     Arthritis     Basal cell carcinoma 11/2014    anterior chest    COVID-19     12/19/20 and possibly Dec. 2021    Herpes simplex     fever blisters    History of abnormal cervical Pap smear     Hormone replacement therapy (HRT)     Malignant melanoma of skin, unspecified     right arm    PONV (postoperative nausea and vomiting)     Seasonal allergies        Past Surgical History:   has a past surgical history that includes Neck mass excision (Right, 1998); Skin cancer excision (N/A, 11/2014); Breast surgery; Skin cancer excision (2017); Colonoscopy (N/A, 04/26/2018); Epidural steroid injection into cervical spine (N/A, 02/11/2019); Augmentation of breast; Excision of melanoma; and Cosmetic surgery (Breat Augmentation).    Family History:  family history includes Anemia in her father; Cancer in her maternal grandmother; Cataracts in her father and mother; Colon cancer in her maternal grandmother; Diabetes in her maternal grandfather; Heart disease in her brother, father, and mother; Hyperlipidemia in her mother; Hypertension in her father and mother; Kidney disease in her mother; Melanoma in her brother and brother; No Known Problems in her brother, daughter, and sister.     Social History:  Social History[1]    Review of Systems   Constitutional:  Negative for activity change and unexpected weight change.   HENT:  Positive for rhinorrhea and sneezing. Negative for hearing loss and trouble swallowing.    Eyes:  Negative for discharge and visual disturbance.   Respiratory:  Negative for chest tightness and wheezing.    Cardiovascular:  Negative for chest pain and palpitations.   Gastrointestinal:  Positive  "for diarrhea. Negative for blood in stool, constipation and vomiting.   Endocrine: Negative for polydipsia and polyuria.   Genitourinary:  Negative for difficulty urinating, dysuria, hematuria and menstrual problem.   Musculoskeletal:  Positive for arthralgias. Negative for joint swelling and neck pain.   Neurological:  Negative for weakness and headaches.   Psychiatric/Behavioral:  Negative for confusion and dysphoric mood.         Medications:  Current Medications[2]     Allergies:  Review of patient's allergies indicates:   Allergen Reactions    Compazine [prochlorperazine edisylate] Other (See Comments)     Facial drooping       Physical Exam      Vital Signs  Pulse: 77  Resp: 15  SpO2: 98 %  BP: 132/80  BP Location: Left arm  Patient Position: Sitting  Pain Score: 0-No pain  Height and Weight  Height: 5' 2" (157.5 cm)  Weight: 54.5 kg (120 lb 2.4 oz)  BSA (Calculated - sq m): 1.54 sq meters  BMI (Calculated): 22  Weight in (lb) to have BMI = 25: 136.4      Patient Position: Sitting      Physical Exam  Vitals reviewed.   Constitutional:       General: She is not in acute distress.     Appearance: Normal appearance. She is not ill-appearing, toxic-appearing or diaphoretic.   HENT:      Head: Normocephalic and atraumatic.      Right Ear: Tympanic membrane normal.      Left Ear: Tympanic membrane normal.   Eyes:      Extraocular Movements: Extraocular movements intact.      Conjunctiva/sclera: Conjunctivae normal.      Pupils: Pupils are equal, round, and reactive to light.      Comments: Alex cataracts   Neck:      Vascular: No carotid bruit.   Cardiovascular:      Rate and Rhythm: Normal rate and regular rhythm.      Pulses: Normal pulses.      Heart sounds: Normal heart sounds. No murmur heard.  Pulmonary:      Effort: No respiratory distress.      Breath sounds: Normal breath sounds. No wheezing.   Abdominal:      General: Bowel sounds are normal. There is no distension.      Palpations: Abdomen is soft.      " Tenderness: There is no abdominal tenderness. There is no guarding or rebound.   Musculoskeletal:         General: Normal range of motion.   Neurological:      General: No focal deficit present.      Mental Status: She is alert and oriented to person, place, and time.   Psychiatric:         Mood and Affect: Mood normal.         Behavior: Behavior normal.          Laboratory:  CBC:  Recent Labs   Lab 04/26/22  0836 04/27/23  0904 05/14/24  0919   WBC 8.53 5.14 4.80   RBC 4.24 4.25 4.25   Hemoglobin 13.4 13.8 14.0   Hematocrit 41.1 43.0 41.3   Platelets 233 233 248   MCV 97 101 H 97   MCH 31.6 H 32.5 H 32.9 H   MCHC 32.6 32.1 33.9       CMP:  Recent Labs   Lab 04/26/22  0836 04/27/23  0904 05/14/24  0919   Glucose 85 90 88   Calcium 9.1 9.1 9.1   Albumin 3.7 3.6 3.7   Total Protein 6.6 6.6 6.8   Sodium 137 136 136   Potassium 4.8 4.5 4.2   CO2 27 26 22 L   Chloride 103 103 104   BUN 24 H 20 22   Creatinine 0.7 0.7 0.8   Alkaline Phosphatase 49 L 34 L 38 L   ALT 17 13 13   AST 18 14 15   Total Bilirubin 0.4 0.3 0.5       LIPIDS:  Recent Labs   Lab 04/26/22  0836 04/27/23  0904 05/14/24  0919   TSH 1.175 1.814 1.519   HDL 63 45 45   Cholesterol 171 146 173   Triglycerides 71 55 108   LDL Cholesterol 93.8 90.0 106.4   HDL/Cholesterol Ratio 36.8 30.8 26.0   Non-HDL Cholesterol 108 101 128   Total Cholesterol/HDL Ratio 2.7 3.2 3.8       TSH:  Recent Labs   Lab 04/26/22  0836 04/27/23  0904 05/14/24  0919   TSH 1.175 1.814 1.519          Other:   Recent Labs   Lab 06/12/23  1502   Vitamin B-12 497           Assessment/Plan     Ashley Donnelly is a 64 y.o.female with:    Normal physical exam, routine  -     CBC Auto Differential; Future; Expected date: 05/15/2025  -     Comprehensive Metabolic Panel; Future; Expected date: 05/15/2025  -     TSH; Future; Expected date: 05/15/2025  - Performed today.  Will check Basic labs.  RTC in 1 yr for fu or sooner if needed    HSV infection  -     valACYclovir (VALTREX) 1000 MG tablet; 2  tabs po BID x 1 day prn flare  Dispense: 30 tablet; Refill: 2  - Stable.  Cont current regimen.    Allergic rhinitis due to pollen  -     fluticasone propionate (FLONASE) 50 mcg/actuation nasal spray; 2 sprays (100 mcg total) by Each Nostril route once daily.  Dispense: 54.6 mL; Refill: 1  - can roate 2nd gen antihistamine and cont flonase prn.     Anxiety  -     EScitalopram oxalate (LEXAPRO) 10 MG tablet; Take 1 tablet (10 mg total) by mouth once daily.  Dispense: 90 tablet; Refill: 3  - Stable.  Cont current regimen.    Insomnia, unspecified type  - Can try melatonin or OTC Magnesium Oxide 200 mg/d (monitor for diarrhea).     Irritable bowel syndrome, unspecified type  - Stable.  Cont current regimen.    Osteopenia, unspecified location  - Stable.  Cont current regimen.    Screening for lipoid disorders  -     Lipid Panel; Future; Expected date: 05/15/2025    Screening mammogram, encounter for  -     Mammo Digital Screening Bilat w/ Goyo (XPD); Future; Expected date: 02/01/2026    Other orders  -     ondansetron (ZOFRAN) 4 MG tablet; Take 1 tablet (4 mg total) by mouth every 8 (eight) hours as needed for Nausea.  Dispense: 30 tablet; Refill: 1         Chronic conditions status updated as per HPI.  Other than changes above, cont current medications and maintain follow up with specialists.    Follow up in about 1 year (around 4/2/2026) for well visit or sooner if needed.      Ashley Hairston MD  [facility]                       [1]   Social History  Tobacco Use    Smoking status: Never    Smokeless tobacco: Never   Substance Use Topics    Alcohol use: Yes     Alcohol/week: 1.0 standard drink of alcohol     Types: 1 Glasses of wine per week     Comment: 2 gibran per month    Drug use: Never   [2]   Current Outpatient Medications:     biotin 5 mg Cap, Take 1 capsule by mouth once daily., Disp: , Rfl:     CALCIUM ORAL, Take 1,000 mg by mouth once daily., Disp: , Rfl:     docosahexaenoic acid/epa (FISH OIL  ORAL), Take by mouth., Disp: , Rfl:     estradioL (ESTRACE) 1 MG tablet, Take 1 tablet (1 mg total) by mouth once daily., Disp: 30 tablet, Rfl: 0    gabapentin (NEURONTIN) 300 MG capsule, Take 2 capsules (600 mg total) by mouth every evening., Disp: 180 capsule, Rfl: 3    multivitamin capsule, Take 1 capsule by mouth once daily., Disp: , Rfl:     triamcinolone acetonide 0.1% (KENALOG) 0.1 % cream, APPLY SPARINGLY TO AFFECTED AREA TWICE A DAY, Disp: , Rfl:     VITAMIN B COMPLEX NO.12-NIACIN ORAL, Take 1 tablet by mouth once daily., Disp: , Rfl:     [START ON 6/1/2025] EScitalopram oxalate (LEXAPRO) 10 MG tablet, Take 1 tablet (10 mg total) by mouth once daily., Disp: 90 tablet, Rfl: 3    fluocinonide (LIDEX) 0.05 % external solution, Apply topically. (Patient not taking: Reported on 4/1/2025), Disp: , Rfl:     fluticasone propionate (FLONASE) 50 mcg/actuation nasal spray, 2 sprays (100 mcg total) by Each Nostril route once daily., Disp: 54.6 mL, Rfl: 1    norethindrone (AYGESTIN) 5 mg Tab, TAKE 1 TABLET BY MOUTH EVERY DAY, Disp: 90 tablet, Rfl: 0    ondansetron (ZOFRAN) 4 MG tablet, Take 1 tablet (4 mg total) by mouth every 8 (eight) hours as needed for Nausea., Disp: 30 tablet, Rfl: 1    valACYclovir (VALTREX) 1000 MG tablet, 2 tabs po BID x 1 day prn flare, Disp: 30 tablet, Rfl: 2

## 2025-04-01 ENCOUNTER — OFFICE VISIT (OUTPATIENT)
Dept: PRIMARY CARE CLINIC | Facility: CLINIC | Age: 65
End: 2025-04-01
Payer: COMMERCIAL

## 2025-04-01 VITALS
HEIGHT: 62 IN | SYSTOLIC BLOOD PRESSURE: 132 MMHG | BODY MASS INDEX: 22.11 KG/M2 | OXYGEN SATURATION: 98 % | DIASTOLIC BLOOD PRESSURE: 80 MMHG | WEIGHT: 120.13 LBS | HEART RATE: 77 BPM | RESPIRATION RATE: 15 BRPM

## 2025-04-01 DIAGNOSIS — K58.9 IRRITABLE BOWEL SYNDROME, UNSPECIFIED TYPE: ICD-10-CM

## 2025-04-01 DIAGNOSIS — B00.9 HSV INFECTION: ICD-10-CM

## 2025-04-01 DIAGNOSIS — M85.80 OSTEOPENIA, UNSPECIFIED LOCATION: ICD-10-CM

## 2025-04-01 DIAGNOSIS — J30.1 ALLERGIC RHINITIS DUE TO POLLEN: ICD-10-CM

## 2025-04-01 DIAGNOSIS — G47.00 INSOMNIA, UNSPECIFIED TYPE: ICD-10-CM

## 2025-04-01 DIAGNOSIS — Z12.31 SCREENING MAMMOGRAM, ENCOUNTER FOR: ICD-10-CM

## 2025-04-01 DIAGNOSIS — Z13.220 SCREENING FOR LIPOID DISORDERS: ICD-10-CM

## 2025-04-01 DIAGNOSIS — F41.9 ANXIETY: ICD-10-CM

## 2025-04-01 DIAGNOSIS — Z00.00 NORMAL PHYSICAL EXAM, ROUTINE: Primary | ICD-10-CM

## 2025-04-01 PROCEDURE — 1126F AMNT PAIN NOTED NONE PRSNT: CPT | Mod: CPTII,S$GLB,, | Performed by: INTERNAL MEDICINE

## 2025-04-01 PROCEDURE — 99397 PER PM REEVAL EST PAT 65+ YR: CPT | Mod: S$GLB,,, | Performed by: INTERNAL MEDICINE

## 2025-04-01 PROCEDURE — 3008F BODY MASS INDEX DOCD: CPT | Mod: CPTII,S$GLB,, | Performed by: INTERNAL MEDICINE

## 2025-04-01 PROCEDURE — 3288F FALL RISK ASSESSMENT DOCD: CPT | Mod: CPTII,S$GLB,, | Performed by: INTERNAL MEDICINE

## 2025-04-01 PROCEDURE — 1160F RVW MEDS BY RX/DR IN RCRD: CPT | Mod: CPTII,S$GLB,, | Performed by: INTERNAL MEDICINE

## 2025-04-01 PROCEDURE — 3079F DIAST BP 80-89 MM HG: CPT | Mod: CPTII,S$GLB,, | Performed by: INTERNAL MEDICINE

## 2025-04-01 PROCEDURE — 1159F MED LIST DOCD IN RCRD: CPT | Mod: CPTII,S$GLB,, | Performed by: INTERNAL MEDICINE

## 2025-04-01 PROCEDURE — 3075F SYST BP GE 130 - 139MM HG: CPT | Mod: CPTII,S$GLB,, | Performed by: INTERNAL MEDICINE

## 2025-04-01 PROCEDURE — 99999 PR PBB SHADOW E&M-EST. PATIENT-LVL IV: CPT | Mod: PBBFAC,,, | Performed by: INTERNAL MEDICINE

## 2025-04-01 PROCEDURE — 1101F PT FALLS ASSESS-DOCD LE1/YR: CPT | Mod: CPTII,S$GLB,, | Performed by: INTERNAL MEDICINE

## 2025-04-01 RX ORDER — FLUTICASONE PROPIONATE 50 MCG
2 SPRAY, SUSPENSION (ML) NASAL DAILY
Qty: 54.6 ML | Refills: 1 | Status: SHIPPED | OUTPATIENT
Start: 2025-04-01

## 2025-04-01 RX ORDER — ONDANSETRON 4 MG/1
4 TABLET, FILM COATED ORAL EVERY 8 HOURS PRN
Qty: 30 TABLET | Refills: 1 | Status: SHIPPED | OUTPATIENT
Start: 2025-04-01

## 2025-04-01 RX ORDER — ESCITALOPRAM OXALATE 10 MG/1
10 TABLET ORAL DAILY
Qty: 90 TABLET | Refills: 3 | Status: SHIPPED | OUTPATIENT
Start: 2025-06-01

## 2025-04-01 RX ORDER — VALACYCLOVIR HYDROCHLORIDE 1 G/1
TABLET, FILM COATED ORAL
Qty: 30 TABLET | Refills: 2 | Status: SHIPPED | OUTPATIENT
Start: 2025-04-01

## 2025-04-03 ENCOUNTER — OFFICE VISIT (OUTPATIENT)
Dept: OBSTETRICS AND GYNECOLOGY | Facility: CLINIC | Age: 65
End: 2025-04-03
Payer: COMMERCIAL

## 2025-04-03 DIAGNOSIS — Z79.890 HORMONE REPLACEMENT THERAPY (HRT): ICD-10-CM

## 2025-04-03 RX ORDER — PROGESTERONE 100 MG/1
100 CAPSULE ORAL DAILY
Qty: 30 CAPSULE | Refills: 11 | Status: SHIPPED | OUTPATIENT
Start: 2025-04-03 | End: 2026-04-03

## 2025-04-03 RX ORDER — ESTRADIOL 1 MG/1
1 TABLET ORAL DAILY
Qty: 90 TABLET | Refills: 3 | Status: SHIPPED | OUTPATIENT
Start: 2025-04-03 | End: 2026-04-03

## 2025-04-03 NOTE — PROGRESS NOTES
The patient location is: [place]  The chief complaint leading to consultation is: Refill    Visit type: audiovisual    Face to Face time with patient: 20 minutes of total time spent on the encounter, which includes face to face time and non-face to face time preparing to see the patient (eg, review of tests), Obtaining and/or reviewing separately obtained history, Documenting clinical information in the electronic or other health record, Independently interpreting results (not separately reported) and communicating results to the patient/family/caregiver, or Care coordination (not separately reported).     Each patient to whom he or she provides medical services by telemedicine is:  (1) informed of the relationship between the physician and patient and the respective role of any other health care provider with respect to management of the patient; and (2) notified that he or she may decline to receive medical services by telemedicine and may withdraw from such care at any time.    Notes:     History of Present Illness   64 y.o. F patient presents today for hormone replacement therapy refill.     Denies change in medical history of in last year.   Desires to continue hormone replacement therapy.     Past medical and surgical history reviewed.   I have reviewed the patient's medical history in detail and updated the computerized patient record.  Review of patient's allergies indicates:   Allergen Reactions    Compazine [prochlorperazine edisylate] Other (See Comments)     Facial drooping       Review of Systems  Constitutional: negative for chills and fatigue  Gastrointestinal: negative for abdominal pain, constipation, diarrhea, nausea and vomiting  Genitourinary:negative for abnormal menstrual periods, genital lesions and vaginal discharge, dysuria, frequency and hematuria    Physical Examination:  [facility] 07/01/2015    Physical Exam  Deferred, Virtual    Assessment/Plan:  Hormone replacement therapy (HRT)  -     estradioL  (ESTRACE) 1 MG tablet; Take 1 tablet (1 mg total) by mouth once daily.  Dispense: 90 tablet; Refill: 3  -     progesterone (PROMETRIUM) 100 MG capsule; Take 1 capsule (100 mg total) by mouth once daily.  Dispense: 30 capsule; Refill: 11      HRT:   Risk, benefits and alternatives to hormone replacement discussed. Start lowest dose for the shortest duration to relieve menopausal symptoms.  Combined hormonal therapy (HT) is associated with small risks in coronary heart disease, pulmonary embolism, breast cancer, and dementia; but decreased risks in colon cancer and hip fracture.   Patient decided to proceed with therapy.   Discussed switching her progestin due to increased risk of VTE. Originally started aygestin due to irregular bleeding. We will try progesterone.     Discussed Wellness visit recommendations:   Clinical breast & MMG aman  Pap: 2024 NILM/HPV neg. Pap not inducted. Advised pelvic exam optional and as needed  Next may ERICA ELENA spent a total of 20 minutes on the day of the visit.This includes face to face time and non-face to face time preparing to see the patient (eg, review of tests), obtaining and/or reviewing separately obtained history, documenting clinical information in the electronic or other health record, independently interpreting results and communicating results to the patient/family/caregiver, or care coordinator.

## 2025-04-10 ENCOUNTER — PATIENT MESSAGE (OUTPATIENT)
Dept: PRIMARY CARE CLINIC | Facility: CLINIC | Age: 65
End: 2025-04-10
Payer: COMMERCIAL

## 2025-04-14 ENCOUNTER — PATIENT MESSAGE (OUTPATIENT)
Dept: OBSTETRICS AND GYNECOLOGY | Facility: CLINIC | Age: 65
End: 2025-04-14
Payer: COMMERCIAL

## 2025-05-27 ENCOUNTER — PATIENT MESSAGE (OUTPATIENT)
Dept: OBSTETRICS AND GYNECOLOGY | Facility: CLINIC | Age: 65
End: 2025-05-27
Payer: COMMERCIAL

## 2025-05-27 DIAGNOSIS — Z79.890 HORMONE REPLACEMENT THERAPY (HRT): ICD-10-CM

## 2025-06-03 RX ORDER — PROGESTERONE 100 MG/1
200 CAPSULE ORAL DAILY
Qty: 60 CAPSULE | Refills: 11 | Status: SHIPPED | OUTPATIENT
Start: 2025-06-03 | End: 2026-06-03

## 2025-06-04 ENCOUNTER — LAB VISIT (OUTPATIENT)
Dept: LAB | Facility: HOSPITAL | Age: 65
End: 2025-06-04
Attending: INTERNAL MEDICINE
Payer: COMMERCIAL

## 2025-06-04 DIAGNOSIS — Z00.00 NORMAL PHYSICAL EXAM, ROUTINE: ICD-10-CM

## 2025-06-04 DIAGNOSIS — Z13.220 SCREENING FOR LIPOID DISORDERS: ICD-10-CM

## 2025-06-04 LAB
ABSOLUTE EOSINOPHIL (OHS): 0.08 K/UL
ABSOLUTE MONOCYTE (OHS): 0.33 K/UL (ref 0.3–1)
ABSOLUTE NEUTROPHIL COUNT (OHS): 2.39 K/UL (ref 1.8–7.7)
ALBUMIN SERPL BCP-MCNC: 3.9 G/DL (ref 3.5–5.2)
ALP SERPL-CCNC: 44 UNIT/L (ref 40–150)
ALT SERPL W/O P-5'-P-CCNC: 26 UNIT/L (ref 10–44)
ANION GAP (OHS): 10 MMOL/L (ref 8–16)
AST SERPL-CCNC: 16 UNIT/L (ref 11–45)
BASOPHILS # BLD AUTO: 0.03 K/UL
BASOPHILS NFR BLD AUTO: 0.7 %
BILIRUB SERPL-MCNC: 0.4 MG/DL (ref 0.1–1)
BUN SERPL-MCNC: 20 MG/DL (ref 8–23)
CALCIUM SERPL-MCNC: 8.7 MG/DL (ref 8.7–10.5)
CHLORIDE SERPL-SCNC: 107 MMOL/L (ref 95–110)
CHOLEST SERPL-MCNC: 166 MG/DL (ref 120–199)
CHOLEST/HDLC SERPL: 3 {RATIO} (ref 2–5)
CO2 SERPL-SCNC: 22 MMOL/L (ref 23–29)
CREAT SERPL-MCNC: 0.7 MG/DL (ref 0.5–1.4)
ERYTHROCYTE [DISTWIDTH] IN BLOOD BY AUTOMATED COUNT: 12.8 % (ref 11.5–14.5)
GFR SERPLBLD CREATININE-BSD FMLA CKD-EPI: >60 ML/MIN/1.73/M2
GLUCOSE SERPL-MCNC: 90 MG/DL (ref 70–110)
HCT VFR BLD AUTO: 40.9 % (ref 37–48.5)
HDLC SERPL-MCNC: 55 MG/DL (ref 40–75)
HDLC SERPL: 33.1 % (ref 20–50)
HGB BLD-MCNC: 13.3 GM/DL (ref 12–16)
IMM GRANULOCYTES # BLD AUTO: 0.01 K/UL (ref 0–0.04)
IMM GRANULOCYTES NFR BLD AUTO: 0.2 % (ref 0–0.5)
LDLC SERPL CALC-MCNC: 93 MG/DL (ref 63–159)
LYMPHOCYTES # BLD AUTO: 1.46 K/UL (ref 1–4.8)
MCH RBC QN AUTO: 31.5 PG (ref 27–31)
MCHC RBC AUTO-ENTMCNC: 32.5 G/DL (ref 32–36)
MCV RBC AUTO: 97 FL (ref 82–98)
NONHDLC SERPL-MCNC: 111 MG/DL
NUCLEATED RBC (/100WBC) (OHS): 0 /100 WBC
PLATELET # BLD AUTO: 228 K/UL (ref 150–450)
PMV BLD AUTO: 12 FL (ref 9.2–12.9)
POTASSIUM SERPL-SCNC: 4.2 MMOL/L (ref 3.5–5.1)
PROT SERPL-MCNC: 6.8 GM/DL (ref 6–8.4)
RBC # BLD AUTO: 4.22 M/UL (ref 4–5.4)
RELATIVE EOSINOPHIL (OHS): 1.9 %
RELATIVE LYMPHOCYTE (OHS): 34 % (ref 18–48)
RELATIVE MONOCYTE (OHS): 7.7 % (ref 4–15)
RELATIVE NEUTROPHIL (OHS): 55.5 % (ref 38–73)
SODIUM SERPL-SCNC: 139 MMOL/L (ref 136–145)
TRIGL SERPL-MCNC: 90 MG/DL (ref 30–150)
TSH SERPL-ACNC: 1.4 UIU/ML (ref 0.4–4)
WBC # BLD AUTO: 4.3 K/UL (ref 3.9–12.7)

## 2025-06-04 PROCEDURE — 80053 COMPREHEN METABOLIC PANEL: CPT

## 2025-06-04 PROCEDURE — 36415 COLL VENOUS BLD VENIPUNCTURE: CPT | Mod: PN

## 2025-06-04 PROCEDURE — 85025 COMPLETE CBC W/AUTO DIFF WBC: CPT

## 2025-06-04 PROCEDURE — 84443 ASSAY THYROID STIM HORMONE: CPT

## 2025-06-04 PROCEDURE — 80061 LIPID PANEL: CPT

## 2025-06-05 ENCOUNTER — RESULTS FOLLOW-UP (OUTPATIENT)
Dept: PRIMARY CARE CLINIC | Facility: CLINIC | Age: 65
End: 2025-06-05

## 2025-06-05 NOTE — PROGRESS NOTES
I sent pt a my chart message -  I reviewed your labs.  Your thyroid functions are normal.  Your Cholesterol looked good.  Your kidney function and liver functions looked good.  You are not anemic. No further recommendations at this time.  Dr. MOFFETT

## (undated) DEVICE — MARKER SKIN STND TIP BLUE BARR

## (undated) DEVICE — NDL TUOHY EPIDURAL 20G X 3.5

## (undated) DEVICE — GLOVE SURGICAL LATEX SZ 7

## (undated) DEVICE — SYR GLASS 5CC LUER LOK

## (undated) DEVICE — TRAY NERVE BLOCK

## (undated) DEVICE — APPLICATOR CHLORAPREP CLR 10.5